# Patient Record
Sex: FEMALE | Race: WHITE | NOT HISPANIC OR LATINO | Employment: OTHER | URBAN - METROPOLITAN AREA
[De-identification: names, ages, dates, MRNs, and addresses within clinical notes are randomized per-mention and may not be internally consistent; named-entity substitution may affect disease eponyms.]

---

## 2017-02-01 ENCOUNTER — OFFICE VISIT (OUTPATIENT)
Dept: GASTROENTEROLOGY | Facility: CLINIC | Age: 62
End: 2017-02-01

## 2017-02-01 VITALS
DIASTOLIC BLOOD PRESSURE: 70 MMHG | BODY MASS INDEX: 43.97 KG/M2 | SYSTOLIC BLOOD PRESSURE: 132 MMHG | HEIGHT: 66 IN | WEIGHT: 273.6 LBS

## 2017-02-01 DIAGNOSIS — R11.0 NAUSEA: ICD-10-CM

## 2017-02-01 DIAGNOSIS — K63.5 COLON POLYPS: ICD-10-CM

## 2017-02-01 DIAGNOSIS — R19.7 DIARRHEA, UNSPECIFIED TYPE: ICD-10-CM

## 2017-02-01 DIAGNOSIS — R10.11 RIGHT UPPER QUADRANT ABDOMINAL PAIN: Primary | ICD-10-CM

## 2017-02-01 PROCEDURE — 99203 OFFICE O/P NEW LOW 30 MIN: CPT | Performed by: INTERNAL MEDICINE

## 2017-02-01 NOTE — PROGRESS NOTES
PATIENT INFORMATION  Diane Gerardo       - 1955    CHIEF COMPLAINT  Chief Complaint   Patient presents with   • Abdominal Pain   • Diarrhea   • Nausea       HISTORY OF PRESENT ILLNESS  Abdominal Pain   Associated symptoms include arthralgias, diarrhea, a fever and nausea.   Diarrhea    Associated symptoms include abdominal pain, arthralgias and a fever.   Nausea   Associated symptoms include abdominal pain, arthralgias, fatigue, a fever, joint swelling, nausea, numbness and weakness.       RUQ pain for the past 2 months. Pain is sharp, intermittent and at times radiates to the epigastric and back. There is some nausea but no vomiting. Eating does not affect this. No alleviating factors. Pain occurs daily.  CT was done at [facility] and US. CT without contrast was unremarkable. US showed hepatic steatosis.   BM will alternate from diarrhea to normal.  Labs with elevated alk. Phos of 123 and normal liver enzymes. No ETOH use. Hemoglobin was normal.  Last cls was over 8 years ago. One polyp was removed. Worse with walking or certain activity.  She has RA and is on Enbrel for this.  REVIEW OF SYSTEMS  Review of Systems   Constitutional: Positive for fatigue and fever.   HENT: Positive for ear pain, hearing loss and tinnitus.    Eyes: Positive for visual disturbance.   Respiratory: Positive for apnea.    Gastrointestinal: Positive for abdominal pain, diarrhea and nausea.   Musculoskeletal: Positive for arthralgias, back pain and joint swelling.   Neurological: Positive for dizziness, weakness and numbness.   Hematological: Bruises/bleeds easily.   All other systems reviewed and are negative.        ACTIVE PROBLEMS  Patient Active Problem List    Diagnosis   • Degeneration of intervertebral disc of lumbosacral region [M51.37]   • Morbid obesity [E66.01]   • Primary osteoarthritis of both knees [M17.0]     Overview Note:     Overview:   Ortho Dr Chase [place].     • Wears eyeglasses [Z97.3]      Overview Note:     Overview:   Eye care Dr [place]. [place].     • History of colonic polyps [Z86.010]   • Hypercholesterolemia [E78.00]   • Seasonal allergic rhinitis [J30.2]   • Osteoarthritis of both knees [M17.0]   • Hypothyroidism due to Hashimoto's thyroiditis [E03.8, E06.3]     Overview Note:     Overview:   Endo Dr Potter     • Anemia of chronic disorder [D63.8]   • Rheumatoid arthritis involving multiple joints [M06.9]   • Obstructive sleep apnea syndrome [G47.33]   • Type 2 diabetes mellitus [E11.9]     Overview Note:     Overview:   Endo Dr Potter.  [facility] in [place]. Last seen June 2016. No retinopathy reported.     • Arthralgia of multiple joints [M25.50]   • Chronic depression [F32.9]     Overview Note:     Overview:   Off/on. No meds 2016.     • Steatosis of liver [K76.0]     Overview Note:     Overview:   Noted on U/S 2012.     • History of biliary T-tube placement [Z98.890]   • Lactose intolerance [E73.9]   • Non-toxic multinodular goiter [E04.2]   • Hypothyroidism [E03.9]   • Snoring [R06.83]   • Vitamin D deficiency [E55.9]         PAST MEDICAL HISTORY  Past Medical History   Diagnosis Date   • Anemia    • Anxiety    • Depression    • Diabetes mellitus    • Disease of thyroid gland    • Hypertension    • Rheumatoid arthritis          SURGICAL HISTORY  Past Surgical History   Procedure Laterality Date   • Hysterectomy     • Cholecystectomy     • Neck surgery  05/05/2011         FAMILY HISTORY  Family History   Problem Relation Age of Onset   • Diabetes Mother    • Heart disease Father    • Cancer Father          SOCIAL HISTORY  Social History     Occupational History   • Not on file.     Social History Main Topics   • Smoking status: Former Smoker   • Smokeless tobacco: Never Used   • Alcohol use No   • Drug use: No   • Sexual activity: Defer         CURRENT MEDICATIONS    Current Outpatient Prescriptions:   •  atorvastatin (LIPITOR) 10 MG tablet, TAKE 1 TABLET EVERY  "DAY, Disp: , Rfl: 2  •  cholecalciferol (VITAMIN D3) 1000 UNITS tablet, Take 2,000 Units by mouth daily., Disp: , Rfl:   •  hydrochlorothiazide (HYDRODIURIL) 25 MG tablet, Take 25 mg by mouth daily., Disp: , Rfl:   •  HYDROcodone-acetaminophen (NORCO) 5-325 MG per tablet, Take 1 tablet by mouth every 6 (six) hours as needed for moderate pain (4-6) for up to 15 doses., Disp: 15 tablet, Rfl: 0  •  levothyroxine (SYNTHROID, LEVOTHROID) 150 MCG tablet, Take 150 mcg by mouth daily., Disp: , Rfl:   •  metFORMIN (GLUCOPHAGE) 1000 MG tablet, Take 1,000 mg by mouth daily with breakfast., Disp: , Rfl:   •  metFORMIN XR (GLUCOPHAGE-XR) 750 MG 24 hr tablet, TAKE 1 TABLET BY MOUTH DAILY WITH BREAKFAST, Disp: , Rfl: 2  •  methotrexate 2.5 MG tablet, Take 10 mg by mouth 3 (three) doses each week. Take doses 12 (twelve) hours apart from each other., Disp: , Rfl:     ALLERGIES  Nsaids; Penicillins; and Sulfa antibiotics    VITALS  Vitals:    02/01/17 1505   BP: 132/70   Weight: 273 lb 9.6 oz (124 kg)   Height: 66\" (167.6 cm)       LAST RESULTS   Admission on 10/14/2015, Discharged on 10/14/2015   Component Date Value Ref Range Status   • Color, UA 10/14/2015 Yellow  Yellow Final   • Appearance, UA 10/14/2015 Clear  Clear Final   • Glucose, UA 10/14/2015 Negative  Negative mg/dL Final   • Bilirubin, UA 10/14/2015 Negative  Negative Final   • Ketones, UA 10/14/2015 Negative  Negative mg/dL Final   • Specific Gravity, UA 10/14/2015 1.010  1.003 - 1.030 Final   • pH, UA 10/14/2015 5.5  4.5 - 8.0 Final   • Protein, UA 10/14/2015 Negative  Negative,Trace mg/dL Final   • Urobilinogen, UA 10/14/2015 0.2  0.2 - 1.0 E.U./dL Final   • Nitrite, UA 10/14/2015 Negative  Negative Final   • Blood, UA 10/14/2015 Negative  Negative Final   • Leukocytes, UA 10/14/2015 Negative  Negative Final   • WBC 10/14/2015 4.87  4.80 - 10.80 K/Cumm Final   • RBC 10/14/2015 4.09* 4.20 - 5.40 Million Final   • Hemoglobin 10/14/2015 11.4* 12.0 - 16.0 g/dL Final   • " Hematocrit 10/14/2015 35.1* 37.0 - 47.0 % Final   • MCV 10/14/2015 85.8  81.0 - 99.0 fL Final   • MCH 10/14/2015 27.9  27.0 - 31.0 pg Final   • MCHC 10/14/2015 32.5  31.0 - 37.0 g/dL Final   • RDW-CV 10/14/2015 14.6* 11.5 - 14.5 % Final   • Platelets 10/14/2015 213  140 - 500 K/Cumm Final   • MPV 10/14/2015 9.6  7.4 - 10.4 fL Final   • Neutrophil Rel % 10/14/2015 44* 45 - 70 % Final   • Lymphocyte Rel % 10/14/2015 35  20 - 45 % Final   • Monocyte Rel % 10/14/2015 9* 3 - 8 % Final   • Eosinophil Rel % 10/14/2015 3  0 - 4 % Final   • Basophil Rel % 10/14/2015 0  0 - 2 % Final   • Atypical Lymphocytes Rel % 10/14/2015 9  % Final   • Neutrophils Absolute 10/14/2015 2.09  1.50 - 8.30 K/Cumm Final   • Lymphocytes Absolute 10/14/2015 2.02  0.60 - 4.80 K/Cumm Final   • Monocytes Absolute 10/14/2015 0.55  0.00 - 1.00 K/Cumm Final   • Eosinophils Absolute 10/14/2015 0.14  0.10 - 0.30 K/Cumm Final   • Basophils Absolute 10/14/2015 0.04  0.00 - 0.20 K/Cumm Final   • Platelet Morphology 10/14/2015 Normal   Final   • RBC Morphology 10/14/2015 Normal   Final   • Immature Granulocyte Rel % 10/14/2015 0.0  0.0 - 0.6 % Final   • Troponin T 10/14/2015 <0.011  ng/mL Final    Comment:   Troponin T Reference Range:  0.000-0.030      Negative for AMI  0.031-0.100      Indeterminant for AMI       • Glucose 10/14/2015 92  65 - 99 mg/dL Final   • BUN 10/14/2015 15  6 - 20 mg/dL Final   • Creatinine 10/14/2015 0.89  0.57 - 1.00 mg/dL Final   • Sodium 10/14/2015 141  136 - 145 mmol/L Final   • Potassium 10/14/2015 4.5  3.5 - 5.2 mmol/L Final   • Chloride 10/14/2015 107  98 - 107 mmol/L Final   • CO2 10/14/2015 22  22 - 29 mmol/L Final   • Total Protein 10/14/2015 6.8  6.0 - 8.5 g/dL Final   • Albumin 10/14/2015 3.7  3.5 - 5.2 g/dL Final   • AST (SGOT) 10/14/2015 18  5 - 32 U/L Final   • Calcium 10/14/2015 8.8  8.6 - 10.5 mg/dL Final   • Total Bilirubin 10/14/2015 0.3  0.1 - 1.2 mg/dL Final   • Alkaline Phosphatase 10/14/2015 90  40 - 129 U/L  Final   • ALT (SGPT) 10/14/2015 20  5 - 33 U/L Final   • Est GFR by Clearance 10/14/2015 >60  ml/min/1.732 Final    Comment:   GFR Normal                            >60  Chronic Kidney Disease          <60  Kidney Failure                         <15         No results found.    PHYSICAL EXAM  Physical Exam   Constitutional: She is oriented to person, place, and time. She appears well-developed and well-nourished. No distress.   HENT:   Head: Normocephalic and atraumatic.   Mouth/Throat: Oropharynx is clear and moist.   Eyes: EOM are normal. Pupils are equal, round, and reactive to light.   Neck: Normal range of motion. No tracheal deviation present.   Cardiovascular: Normal rate, regular rhythm, normal heart sounds and intact distal pulses.  Exam reveals no gallop and no friction rub.    No murmur heard.  Pulmonary/Chest: Effort normal and breath sounds normal. No stridor. No respiratory distress. She has no wheezes. She has no rales. She exhibits no tenderness.   Abdominal: Soft. Bowel sounds are normal. She exhibits no distension. There is tenderness. There is no rebound and no guarding.   ruq pain but difficult to assess liver edge, some epigastric pain, no rebound or guarding.   Musculoskeletal: She exhibits no edema.   Lymphadenopathy:     She has no cervical adenopathy.   Neurological: She is alert and oriented to person, place, and time.   Skin: Skin is warm. She is not diaphoretic.   Psychiatric: She has a normal mood and affect. Her behavior is normal. Judgment and thought content normal.   Nursing note and vitals reviewed.      ASSESSMENT  Diagnoses and all orders for this visit:    Right upper quadrant abdominal pain  -     Case Request; Standing  -     Case Request    Nausea  -     Case Request; Standing  -     Case Request    Diarrhea, unspecified type  -     Case Request; Standing  -     Case Request    Colon polyps    Other orders  -     Implement Anesthesia orders day of procedure.; Standing  -      Obtain informed consent; Standing  -     Verify informed consent; Standing          PLAN  No Follow-up on file.    egd and cls and possibly repeat ct with contrast .        Risks, benefits and alternatives discussed including but not limited to the complications of bleeding, perforation and sedation related problems.

## 2017-02-07 ENCOUNTER — ANESTHESIA EVENT (OUTPATIENT)
Dept: PERIOP | Facility: HOSPITAL | Age: 62
End: 2017-02-07

## 2017-02-08 ENCOUNTER — HOSPITAL ENCOUNTER (OUTPATIENT)
Facility: HOSPITAL | Age: 62
Setting detail: HOSPITAL OUTPATIENT SURGERY
Discharge: HOME | End: 2017-02-08
Attending: INTERNAL MEDICINE | Admitting: INTERNAL MEDICINE

## 2017-02-08 ENCOUNTER — ANESTHESIA (OUTPATIENT)
Dept: PERIOP | Facility: HOSPITAL | Age: 62
End: 2017-02-08

## 2017-02-08 VITALS
OXYGEN SATURATION: 95 % | TEMPERATURE: 98 F | HEART RATE: 75 BPM | SYSTOLIC BLOOD PRESSURE: 124 MMHG | RESPIRATION RATE: 15 BRPM | DIASTOLIC BLOOD PRESSURE: 65 MMHG

## 2017-02-08 DIAGNOSIS — R10.11 RIGHT UPPER QUADRANT ABDOMINAL PAIN: ICD-10-CM

## 2017-02-08 DIAGNOSIS — R11.0 NAUSEA: ICD-10-CM

## 2017-02-08 DIAGNOSIS — R19.7 DIARRHEA, UNSPECIFIED TYPE: ICD-10-CM

## 2017-02-08 LAB
GLUCOSE BLDC GLUCOMTR-MCNC: 142 MG/DL (ref 70–130)
POTASSIUM BLD-SCNC: 3.7 MMOL/L (ref 3.5–5.2)

## 2017-02-08 PROCEDURE — 45380 COLONOSCOPY AND BIOPSY: CPT | Performed by: INTERNAL MEDICINE

## 2017-02-08 PROCEDURE — 84132 ASSAY OF SERUM POTASSIUM: CPT | Performed by: NURSE ANESTHETIST, CERTIFIED REGISTERED

## 2017-02-08 PROCEDURE — 25010000002 PROPOFOL 10 MG/ML EMULSION: Performed by: NURSE ANESTHETIST, CERTIFIED REGISTERED

## 2017-02-08 PROCEDURE — 43239 EGD BIOPSY SINGLE/MULTIPLE: CPT | Performed by: INTERNAL MEDICINE

## 2017-02-08 PROCEDURE — 82962 GLUCOSE BLOOD TEST: CPT

## 2017-02-08 RX ORDER — SODIUM CHLORIDE, SODIUM LACTATE, POTASSIUM CHLORIDE, CALCIUM CHLORIDE 600; 310; 30; 20 MG/100ML; MG/100ML; MG/100ML; MG/100ML
9 INJECTION, SOLUTION INTRAVENOUS CONTINUOUS
Status: DISCONTINUED | OUTPATIENT
Start: 2017-02-08 | End: 2017-02-08 | Stop reason: HOSPADM

## 2017-02-08 RX ORDER — SODIUM CHLORIDE 0.9 % (FLUSH) 0.9 %
1-10 SYRINGE (ML) INJECTION AS NEEDED
Status: DISCONTINUED | OUTPATIENT
Start: 2017-02-08 | End: 2017-02-08 | Stop reason: HOSPADM

## 2017-02-08 RX ORDER — LIDOCAINE HYDROCHLORIDE 10 MG/ML
0.3 INJECTION, SOLUTION EPIDURAL; INFILTRATION; INTRACAUDAL; PERINEURAL ONCE
Status: DISCONTINUED | OUTPATIENT
Start: 2017-02-08 | End: 2017-02-08 | Stop reason: HOSPADM

## 2017-02-08 RX ORDER — PROPOFOL 10 MG/ML
VIAL (ML) INTRAVENOUS AS NEEDED
Status: DISCONTINUED | OUTPATIENT
Start: 2017-02-08 | End: 2017-02-08 | Stop reason: SURG

## 2017-02-08 RX ORDER — LIDOCAINE HYDROCHLORIDE 20 MG/ML
INJECTION, SOLUTION INFILTRATION; PERINEURAL AS NEEDED
Status: DISCONTINUED | OUTPATIENT
Start: 2017-02-08 | End: 2017-02-08 | Stop reason: SURG

## 2017-02-08 RX ADMIN — PROPOFOL 50 MG: 10 INJECTION, EMULSION INTRAVENOUS at 11:19

## 2017-02-08 RX ADMIN — PROPOFOL 50 MG: 10 INJECTION, EMULSION INTRAVENOUS at 11:38

## 2017-02-08 RX ADMIN — PROPOFOL 50 MG: 10 INJECTION, EMULSION INTRAVENOUS at 11:24

## 2017-02-08 RX ADMIN — PROPOFOL 100 MG: 10 INJECTION, EMULSION INTRAVENOUS at 11:09

## 2017-02-08 RX ADMIN — PROPOFOL 50 MG: 10 INJECTION, EMULSION INTRAVENOUS at 11:27

## 2017-02-08 RX ADMIN — PROPOFOL 100 MG: 10 INJECTION, EMULSION INTRAVENOUS at 11:06

## 2017-02-08 RX ADMIN — PROPOFOL 50 MG: 10 INJECTION, EMULSION INTRAVENOUS at 11:16

## 2017-02-08 RX ADMIN — PROPOFOL 50 MG: 10 INJECTION, EMULSION INTRAVENOUS at 11:26

## 2017-02-08 RX ADMIN — PROPOFOL 50 MG: 10 INJECTION, EMULSION INTRAVENOUS at 11:33

## 2017-02-08 RX ADMIN — LIDOCAINE HYDROCHLORIDE 100 MG: 20 INJECTION, SOLUTION INFILTRATION; PERINEURAL at 11:05

## 2017-02-08 RX ADMIN — SODIUM CHLORIDE, SODIUM LACTATE, POTASSIUM CHLORIDE, AND CALCIUM CHLORIDE: .6; .31; .03; .02 INJECTION, SOLUTION INTRAVENOUS at 11:03

## 2017-02-08 RX ADMIN — PROPOFOL 50 MG: 10 INJECTION, EMULSION INTRAVENOUS at 11:12

## 2017-02-08 NOTE — PLAN OF CARE
Problem: Patient Care Overview (Adult)  Goal: Plan of Care Review  Outcome: Outcome(s) achieved Date Met:  02/08/17 02/08/17 1158   Coping/Psychosocial Response Interventions   Plan Of Care Reviewed With patient   Patient Care Overview   Progress improving   Outcome Evaluation   Outcome Summary/Follow up Plan vss, waiting to go home

## 2017-02-08 NOTE — H&P
HISTORY OF PRESENT ILLNESS  Abdominal Pain   Associated symptoms include arthralgias, diarrhea, a fever and nausea.   Diarrhea    Associated symptoms include abdominal pain, arthralgias and a fever.   Nausea   Associated symptoms include abdominal pain, arthralgias, fatigue, a fever, joint swelling, nausea, numbness and weakness.         RUQ pain for the past 2 months. Pain is sharp, intermittent and at times radiates to the epigastric and back. There is some nausea but no vomiting. Eating does not affect this. No alleviating factors. Pain occurs daily. CT was [facility] at [facility] and US. CT without contrast was unremarkable. US showed hepatic steatosis.   BM will alternate from diarrhea to normal.  Labs with elevated alk. Phos of 123 and normal liver enzymes. No ETOH use. Hemoglobin was normal. Last cls was over 8 years ago. One polyp was removed. Worse with walking or certain activity.  She has RA and is on Enbrel for this.  REVIEW OF SYSTEMS  Review of Systems   Constitutional: Positive for fatigue and fever.   HENT: Positive for ear pain, hearing loss and tinnitus.   Eyes: Positive for visual disturbance.   Respiratory: Positive for apnea.   Gastrointestinal: Positive for abdominal pain, diarrhea and nausea.   Musculoskeletal: Positive for arthralgias, back pain and joint swelling.   Neurological: Positive for dizziness, weakness and numbness.   Hematological: Bruises/bleeds easily.   All other systems reviewed and are negative.           ACTIVE PROBLEMS       Patient Active Problem List     Diagnosis   • Degeneration of intervertebral disc of lumbosacral region [M51.37]   • Morbid obesity [E66.01]   • Primary osteoarthritis of both knees [M17.0]       Overview Note:       Overview:   Ortho Dr Chase [place].   • Wears eyeglasses [Z97.3]       Overview Note:       Overview:   Eye care Dr [place]. [facility] [place].   • History of colonic polyps [Z86.010]   • Hypercholesterolemia [E78.00]   • Seasonal  allergic rhinitis [J30.2]   • Osteoarthritis of both knees [M17.0]   • Hypothyroidism due to Hashimoto's thyroiditis [E03.8, E06.3]       Overview Note:       Overview:   Endo Dr Potter   • Anemia of chronic disorder [D63.8]   • Rheumatoid arthritis involving multiple joints [M06.9]   • Obstructive sleep apnea syndrome [G47.33]   • Type 2 diabetes mellitus [E11.9]       Overview Note:       Overview:   Endo Dr Potter.  [facility] [place] in [place]. Last seen June 2016. No retinopathy reported.   • Arthralgia of multiple joints [M25.50]   • Chronic depression [F32.9]       Overview Note:       Overview:   Off/on. No meds 2016.   • Steatosis of liver [K76.0]       Overview Note:       Overview:   Noted on U/S 2012.   • History of biliary T-tube placement [Z98.890]   • Lactose intolerance [E73.9]   • Non-toxic multinodular goiter [E04.2]   • Hypothyroidism [E03.9]   • Snoring [R06.83]   • Vitamin D deficiency [E55.9]            PAST MEDICAL HISTORY   Medical History         Past Medical History   Diagnosis Date   • Anemia     • Anxiety     • Depression     • Diabetes mellitus     • Disease of thyroid gland     • Hypertension     • Rheumatoid arthritis                 SURGICAL HISTORY   Surgical History          Past Surgical History   Procedure Laterality Date   • Hysterectomy       • Cholecystectomy       • Neck surgery   05/05/2011               FAMILY HISTORY        Family History   Problem Relation Age of Onset   • Diabetes Mother     • Heart disease Father     • Cancer Father              SOCIAL HISTORY  Social History          Occupational History   • Not on file.           Social History Main Topics   • Smoking status: Former Smoker   • Smokeless tobacco: Never Used   • Alcohol use No   • Drug use: No   • Sexual activity: Defer            CURRENT MEDICATIONS     Current Outpatient Prescriptions:   • atorvastatin (LIPITOR) 10 MG tablet, TAKE 1 TABLET EVERY DAY, Disp: , Rfl: 2  • cholecalciferol  "(VITAMIN D3) 1000 UNITS tablet, Take 2,000 Units by mouth daily., Disp: , Rfl:   • hydrochlorothiazide (HYDRODIURIL) 25 MG tablet, Take 25 mg by mouth daily., Disp: , Rfl:   • HYDROcodone-acetaminophen (NORCO) 5-325 MG per tablet, Take 1 tablet by mouth every 6 (six) hours as needed for moderate pain (4-6) for up to 15 doses., Disp: 15 tablet, Rfl: 0  • levothyroxine (SYNTHROID, LEVOTHROID) 150 MCG tablet, Take 150 mcg by mouth daily., Disp: , Rfl:   • metFORMIN (GLUCOPHAGE) 1000 MG tablet, Take 1,000 mg by mouth daily with breakfast., Disp: , Rfl:   • metFORMIN XR (GLUCOPHAGE-XR) 750 MG 24 hr tablet, TAKE 1 TABLET BY MOUTH DAILY WITH BREAKFAST, Disp: , Rfl: 2  • methotrexate 2.5 MG tablet, Take 10 mg by mouth 3 (three) doses each week. Take doses 12 (twelve) hours apart from each other., Disp: , Rfl:      ALLERGIES  Nsaids; Penicillins; and Sulfa antibiotics     VITALS   Vitals    Vitals:     02/01/17 1505   BP: 132/70   Weight: 273 lb 9.6 oz (124 kg)   Height: 66\" (167.6 cm)            LAST RESULTS           Admission on 10/14/2015, Discharged on 10/14/2015   Component Date Value Ref Range Status   • Color, UA 10/14/2015 Yellow  Yellow Final   • Appearance, UA 10/14/2015 Clear  Clear Final   • Glucose, UA 10/14/2015 Negative  Negative mg/dL Final   • Bilirubin, UA 10/14/2015 Negative  Negative Final   • Ketones, UA 10/14/2015 Negative  Negative mg/dL Final   • Specific Gravity, UA 10/14/2015 1.010  1.003 - 1.030 Final   • pH, UA 10/14/2015 5.5  4.5 - 8.0 Final   • Protein, UA 10/14/2015 Negative  Negative,Trace mg/dL Final   • Urobilinogen, UA 10/14/2015 0.2  0.2 - 1.0 E.U./dL Final   • Nitrite, UA 10/14/2015 Negative  Negative Final   • Blood, UA 10/14/2015 Negative  Negative Final   • Leukocytes, UA 10/14/2015 Negative  Negative Final   • WBC 10/14/2015 4.87  4.80 - 10.80 K/Cumm Final   • RBC 10/14/2015 4.09* 4.20 - 5.40 Million Final   • Hemoglobin 10/14/2015 11.4* 12.0 - 16.0 g/dL Final   • Hematocrit " 10/14/2015 35.1* 37.0 - 47.0 % Final   • MCV 10/14/2015 85.8  81.0 - 99.0 fL Final   • MCH 10/14/2015 27.9  27.0 - 31.0 pg Final   • MCHC 10/14/2015 32.5  31.0 - 37.0 g/dL Final   • RDW-CV 10/14/2015 14.6* 11.5 - 14.5 % Final   • Platelets 10/14/2015 213  140 - 500 K/Cumm Final   • MPV 10/14/2015 9.6  7.4 - 10.4 fL Final   • Neutrophil Rel % 10/14/2015 44* 45 - 70 % Final   • Lymphocyte Rel % 10/14/2015 35  20 - 45 % Final   • Monocyte Rel % 10/14/2015 9* 3 - 8 % Final   • Eosinophil Rel % 10/14/2015 3  0 - 4 % Final   • Basophil Rel % 10/14/2015 0  0 - 2 % Final   • Atypical Lymphocytes Rel % 10/14/2015 9  % Final   • Neutrophils Absolute 10/14/2015 2.09  1.50 - 8.30 K/Cumm Final   • Lymphocytes Absolute 10/14/2015 2.02  0.60 - 4.80 K/Cumm Final   • Monocytes Absolute 10/14/2015 0.55  0.00 - 1.00 K/Cumm Final   • Eosinophils Absolute 10/14/2015 0.14  0.10 - 0.30 K/Cumm Final   • Basophils Absolute 10/14/2015 0.04  0.00 - 0.20 K/Cumm Final   • Platelet Morphology 10/14/2015 Normal    Final   • RBC Morphology 10/14/2015 Normal    Final   • Immature Granulocyte Rel % 10/14/2015 0.0  0.0 - 0.6 % Final   • Troponin T 10/14/2015 <0.011  ng/mL Final     Comment:   Troponin T Reference Range:  0.000-0.030 Negative for AMI  0.031-0.100 Indeterminant for AMI      • Glucose 10/14/2015 92  65 - 99 mg/dL Final   • BUN 10/14/2015 15  6 - 20 mg/dL Final   • Creatinine 10/14/2015 0.89  0.57 - 1.00 mg/dL Final   • Sodium 10/14/2015 141  136 - 145 mmol/L Final   • Potassium 10/14/2015 4.5  3.5 - 5.2 mmol/L Final   • Chloride 10/14/2015 107  98 - 107 mmol/L Final   • CO2 10/14/2015 22  22 - 29 mmol/L Final   • Total Protein 10/14/2015 6.8  6.0 - 8.5 g/dL Final   • Albumin 10/14/2015 3.7  3.5 - 5.2 g/dL Final   • AST (SGOT) 10/14/2015 18  5 - 32 U/L Final   • Calcium 10/14/2015 8.8  8.6 - 10.5 mg/dL Final   • Total Bilirubin 10/14/2015 0.3  0.1 - 1.2 mg/dL Final   • Alkaline Phosphatase 10/14/2015 90  40 - 129 U/L Final   • ALT (SGPT)  10/14/2015 20  5 - 33 U/L Final   • Est GFR by Clearance 10/14/2015 >60  ml/min/1.732 Final     Comment:   GFR Normal >60  Chronic Kidney Disease <60  Kidney Failure <15         No results found.     PHYSICAL EXAM  Physical Exam   Constitutional: She is oriented to person, place, and time. She appears well-developed and well-nourished. No distress.   HENT:   Head: Normocephalic and atraumatic.   Mouth/Throat: Oropharynx is clear and moist.   Eyes: EOM are normal. Pupils are equal, round, and reactive to light.   Neck: Normal range of motion. No tracheal deviation present.   Cardiovascular: Normal rate, regular rhythm, normal heart sounds and intact distal pulses. Exam reveals no gallop and no friction rub.   No murmur heard.  Pulmonary/Chest: Effort normal and breath sounds normal. No stridor. No respiratory distress. She has no wheezes. She has no rales. She exhibits no tenderness.   Abdominal: Soft. Bowel sounds are normal. She exhibits no distension. There is tenderness. There is no rebound and no guarding.   ruq pain but difficult to assess liver edge, some epigastric pain, no rebound or guarding.   Musculoskeletal: She exhibits no edema.   Lymphadenopathy:   She has no cervical adenopathy.   Neurological: She is alert and oriented to person, place, and time.   Skin: Skin is warm. She is not diaphoretic.   Psychiatric: She has a normal mood and affect. Her behavior is normal. Judgment and thought content normal.   Nursing note and vitals reviewed.        ASSESSMENT  Diagnoses and all orders for this visit:     Right upper quadrant abdominal pain  - Case Request; Standing  - Case Request     Nausea  - Case Request; Standing  - Case Request     Diarrhea, unspecified type  - Case Request; Standing  - Case Request     Colon polyps     Other orders  - Implement Anesthesia orders day of procedure.; Standing  - Obtain informed consent; Standing  - Verify informed consent; Standing              PLAN  No Follow-up on  file.     egd and cls and possibly repeat ct with contrast .           Risks, benefits and alternatives discussed including but not limited to the complications of bleeding, perforation and sedation related problems.

## 2017-02-08 NOTE — PLAN OF CARE
Problem: Patient Care Overview (Adult)  Goal: Adult Individualization and Mutuality  Outcome: Outcome(s) achieved Date Met:  02/08/17 02/08/17 1011   Individualization   Patient Specific Preferences none

## 2017-02-08 NOTE — OP NOTE
EGD and Colonoscopy Note:      Indication: Right upper quadrant abdominal pain, nausea, intermittent diarrhea, history of polyps.     Consent: Procedure of EGD and colonoscopy was explained to the patient and detail including but not limited to the complications of bleeding perforation and possible reactions to sedation.  The patient understood all this and was willing to proceed.    Sedation: Sedation was provided by anesthesia.    Procedure:  After excellent sedation a flexible endoscope was passed into the oropharynx into the distal esophagus.  The Z line was regular and the scope disease was traversed into the stomach although into the antrum.  There is gastric polyps extending from the fundus and body.  Gastritis was noted in the antrum mostly and biopsies were obtained.  No ulcers were noted here..  Scope was retroflexed here straightened and passed into the duodenal bulb all the way into the second portion with ease.  Small bowel biopsies were obtained.  The scope was withdrawn back into the stomach.  Antral biopsies were obtained.  The scope was withdrawn out of the patient with no immediate complications.  She was then turned around to the appropriate position and a digital rectal examination was performed and a flexible colonoscope was inserted into the rectum and passed to the cecum.  The cecum was identified by both the ileocecal valve and the appendiceal orifice.  The overall bowel preparation was fair to poor in the right colon especially the cecal bowl in the ascending colon.  She had some looping and redundancy in the colon such that external pressure and position changes were applied to successfully traversed the scope into the cecum.  Random biopsies were obtained.  No polyps are noted.  The entire examined colonic mucosa was free of any inflammatory changes.  The scope was slowly withdrawn to the rectum and retroflex were internal hemorrhoids are noted.  The scope was straightened and  withdrawn out of the patient with no immediate, patient's and she tolerated the procedure well.      Impression/Plan:  Gastritis  Gastric polyps  Internal hemorrhoids  We'll await biopsy results and a letter be sent to her in regards to this.  She'll see me back in the office in about 2-3 weeks.

## 2017-02-08 NOTE — PLAN OF CARE
Problem: GI Endoscopy (Adult)  Goal: Signs and Symptoms of Listed Potential Problems Will be Absent or Manageable (GI Endoscopy)  Outcome: Ongoing (interventions implemented as appropriate)    02/08/17 1108   GI Endoscopy   Problems Assessed (GI Endoscopy) all   Problems Present (GI Endoscopy) none

## 2017-02-08 NOTE — ANESTHESIA PREPROCEDURE EVALUATION
Anesthesia Evaluation     Patient summary reviewed and Nursing notes reviewed   no history of anesthetic complications:  NPO Status: > 8 hours   Airway   Mallampati: I  TM distance: >3 FB  Neck ROM: full  no difficulty expected  Dental      Comment: Upper permanent bridge, front    Pulmonary - normal exam    breath sounds clear to auscultation  (+) a smoker (quit 4/1/2008) Former, sleep apnea on CPAP,   Cardiovascular - normal exam  Exercise tolerance: poor (<4 METS) (Limited due to hip and knee pain)    Rhythm: regular  Rate: normal    (+) hypertension well controlled, valvular problems/murmurs (asymptomatic) MVP,       Neuro/Psych  (+) psychiatric history Anxiety and Depression,    Numbness: hands on occ, mubness left hip to kneecap.  GI/Hepatic/Renal/Endo    (+) morbid obesity, liver disease (fatty liver), diabetes mellitus type 2 well controlled, hypothyroidism (Hashimoto's thyroiditis),     Musculoskeletal     (+) back pain (DDD L-S spine), neck pain (C4-C5 ACDF with hardware, pain with turning head to left),   Abdominal   (+) obese,    Substance History   Alcohol use: occasional, socially.     OB/GYN negative ob/gyn ROS         Other   (+) blood dyscrasia (anemia of chronic disease), arthritis (rheumatoid arthritis--knees and hips)                               Anesthesia Plan    ASA 3     MAC     intravenous induction   Anesthetic plan and risks discussed with patient.

## 2017-02-08 NOTE — PLAN OF CARE
Problem: Patient Care Overview (Adult)  Goal: Plan of Care Review  Outcome: Ongoing (interventions implemented as appropriate)    02/08/17 1011   Coping/Psychosocial Response Interventions   Plan Of Care Reviewed With patient   Patient Care Overview   Progress no change   Outcome Evaluation   Outcome Summary/Follow up Plan vss, waiting for procedure

## 2017-02-08 NOTE — PLAN OF CARE
Problem: Patient Care Overview (Adult)  Goal: Adult Individualization and Mutuality  Outcome: Ongoing (interventions implemented as appropriate)    02/08/17 1011   Individualization   Patient Specific Preferences none

## 2017-02-08 NOTE — ANESTHESIA POSTPROCEDURE EVALUATION
Patient: Diane Gerardo    Procedure Summary     Date Anesthesia Start Anesthesia Stop Room / Location    02/08/17 1103 1143 [facility] / [facility]       Procedure Diagnosis Surgeon Provider    ESOPHAGOGASTRODUODENOSCOPY (N/A Esophagus); COLONOSCOPY (N/A ) Nausea; Right upper quadrant abdominal pain; Diarrhea, unspecified type  (Nausea [R11.0]; Right upper quadrant abdominal pain [R10.11]; Diarrhea, unspecified type [R19.7]) MD Feli Dye CRNA          Anesthesia Type: MAC  Last vitals  BP      Temp      Pulse     Resp      SpO2        Post Anesthesia Care and Evaluation    Patient location during evaluation: PHASE II  Patient participation: complete - patient participated  Level of consciousness: awake and alert  Pain score: 0  Pain management: adequate  Airway patency: patent  Anesthetic complications: No anesthetic complications  PONV Status: none  Cardiovascular status: acceptable  Respiratory status: acceptable  Hydration status: acceptable

## 2017-02-08 NOTE — PLAN OF CARE
Problem: GI Endoscopy (Adult)  Goal: Signs and Symptoms of Listed Potential Problems Will be Absent or Manageable (GI Endoscopy)  Outcome: Ongoing (interventions implemented as appropriate)    02/08/17 1011   GI Endoscopy   Problems Assessed (GI Endoscopy) all   Problems Present (GI Endoscopy) none

## 2017-02-08 NOTE — PLAN OF CARE
Problem: GI Endoscopy (Adult)  Goal: Signs and Symptoms of Listed Potential Problems Will be Absent or Manageable (GI Endoscopy)  Outcome: Outcome(s) achieved Date Met:  02/08/17 02/08/17 1158   GI Endoscopy   Problems Assessed (GI Endoscopy) all   Problems Present (GI Endoscopy) none

## 2017-02-10 LAB
LAB AP CASE REPORT: NORMAL
Lab: NORMAL
PATH REPORT.FINAL DX SPEC: NORMAL

## 2017-02-17 DIAGNOSIS — R10.11 RUQ ABDOMINAL PAIN: Primary | ICD-10-CM

## 2017-02-17 PROBLEM — K29.70 GASTRITIS: Status: ACTIVE | Noted: 2017-02-17

## 2017-02-17 PROBLEM — K31.7 GASTRIC POLYPS: Status: ACTIVE | Noted: 2017-02-17

## 2017-02-17 PROBLEM — K64.8 INTERNAL HEMORRHOIDS: Status: ACTIVE | Noted: 2017-02-17

## 2017-02-28 ENCOUNTER — OFFICE VISIT (OUTPATIENT)
Dept: GASTROENTEROLOGY | Facility: CLINIC | Age: 62
End: 2017-02-28

## 2017-02-28 ENCOUNTER — APPOINTMENT (OUTPATIENT)
Dept: LAB | Facility: HOSPITAL | Age: 62
End: 2017-02-28

## 2017-02-28 VITALS
DIASTOLIC BLOOD PRESSURE: 66 MMHG | BODY MASS INDEX: 44.29 KG/M2 | SYSTOLIC BLOOD PRESSURE: 124 MMHG | HEIGHT: 66 IN | WEIGHT: 275.6 LBS

## 2017-02-28 DIAGNOSIS — R10.11 RIGHT UPPER QUADRANT ABDOMINAL PAIN: Primary | ICD-10-CM

## 2017-02-28 DIAGNOSIS — R74.8 ELEVATED ALKALINE PHOSPHATASE LEVEL: ICD-10-CM

## 2017-02-28 LAB
ALBUMIN SERPL-MCNC: 3.5 G/DL (ref 3.5–5.2)
ALP SERPL-CCNC: 120 U/L (ref 40–129)
ALT SERPL W P-5'-P-CCNC: 21 U/L (ref 5–33)
AST SERPL-CCNC: 15 U/L (ref 5–32)
BILIRUB CONJ SERPL-MCNC: <0.2 MG/DL (ref 0.2–0.3)
BILIRUB INDIRECT SERPL-MCNC: ABNORMAL MG/DL
BILIRUB SERPL-MCNC: 0.3 MG/DL (ref 0.2–1.2)
PROT SERPL-MCNC: 7.6 G/DL (ref 6–8.5)

## 2017-02-28 PROCEDURE — 36415 COLL VENOUS BLD VENIPUNCTURE: CPT | Performed by: INTERNAL MEDICINE

## 2017-02-28 PROCEDURE — 83516 IMMUNOASSAY NONANTIBODY: CPT | Performed by: INTERNAL MEDICINE

## 2017-02-28 PROCEDURE — 99214 OFFICE O/P EST MOD 30 MIN: CPT | Performed by: INTERNAL MEDICINE

## 2017-02-28 PROCEDURE — 80076 HEPATIC FUNCTION PANEL: CPT | Performed by: INTERNAL MEDICINE

## 2017-02-28 RX ORDER — METHYLPREDNISOLONE 4 MG/1
TABLET ORAL
Refills: 0 | COMMUNITY
Start: 2017-02-16 | End: 2018-07-20

## 2017-02-28 RX ORDER — CIPROFLOXACIN 500 MG/1
TABLET, FILM COATED ORAL
Refills: 0 | COMMUNITY
Start: 2017-02-16 | End: 2018-07-20

## 2017-02-28 NOTE — PROGRESS NOTES
PATIENT INFORMATION  Diane Gerardo       - 1955    CHIEF COMPLAINT  Chief Complaint   Patient presents with   • Abdominal Pain     FOLLOW UP ON EGD AND C/S       HISTORY OF PRESENT ILLNESS  Abdominal Pain   Associated symptoms include arthralgias and myalgias.     She is here today in follow up from her egd and cls. Pathology are all reviewed with her today. Mild duodenitis. She still has right flank pain which radiates to the front. Not affected by eating.  This is exacerbated by some movements. She does have degenerative disc disease. Weight has been stable. She is on Enbrel for RA.  US with moderated steatosis.  REVIEW OF SYSTEMS  Review of Systems   HENT: Positive for tinnitus.    Gastrointestinal: Positive for abdominal pain.   Musculoskeletal: Positive for arthralgias, back pain, joint swelling and myalgias.   Neurological: Positive for numbness.   Hematological: Bruises/bleeds easily.   All other systems reviewed and are negative.        ACTIVE PROBLEMS  Patient Active Problem List    Diagnosis   • Gastric polyps [K31.7]   • Internal hemorrhoids [K64.8]   • Gastritis [K29.70]   • Degeneration of intervertebral disc of lumbosacral region [M51.37]   • Morbid obesity [E66.01]   • Primary osteoarthritis of both knees [M17.0]     Overview Note:     Overview:   Ortho Dr Chase [place].     • Wears eyeglasses [Z97.3]     Overview Note:     Overview:   Eye care Dr Fall[facility]. [facility] [place].     • History of colonic polyps [Z86.010]   • Hypercholesterolemia [E78.00]   • Seasonal allergic rhinitis [J30.2]   • Osteoarthritis of both knees [M17.0]   • Hypothyroidism due to Hashimoto's thyroiditis [E03.8, E06.3]     Overview Note:     Overview:   [facility]     • Anemia of chronic disorder [D63.8]   • Rheumatoid arthritis involving multiple joints [M06.9]   • Obstructive sleep apnea syndrome [G47.33]   • Type 2 diabetes mellitus [E11.9]     Overview Note:     Overview:   [facility].  [facility]  [facility] in [place]. Last seen June 2016. No retinopathy reported.     • Arthralgia of multiple joints [M25.50]   • Chronic depression [F32.9]     Overview Note:     Overview:   Off/on. No meds 2016.     • Steatosis of liver [K76.0]     Overview Note:     Overview:   Noted on [place] 2012.     • History of biliary T-tube placement [Z98.890]   • Lactose intolerance [E73.9]   • Non-toxic multinodular goiter [E04.2]   • Hypothyroidism [E03.9]   • Snoring [R06.83]   • Vitamin D deficiency [E55.9]         PAST MEDICAL HISTORY  Past Medical History   Diagnosis Date   • Anemia    • Anxiety    • Colon polyp    • Depression    • Diabetes mellitus    • Disease of thyroid gland    • Hypertension    • Rheumatoid arthritis          SURGICAL HISTORY  Past Surgical History   Procedure Laterality Date   • Hysterectomy     • Cholecystectomy     • Neck surgery  05/05/2011   • Endoscopy N/A 2/8/2017     Procedure: ESOPHAGOGASTRODUODENOSCOPY;  Surgeon: Domi Juarez MD;  Location: [facility];  Service:    • Colonoscopy N/A 2/8/2017     Procedure: COLONOSCOPY;  Surgeon: Domi Juarez MD;  Location: [facility] OR;  Service:          FAMILY HISTORY  Family History   Problem Relation Age of Onset   • Diabetes Mother    • Heart disease Father    • Cancer Father          SOCIAL HISTORY  Social History     Occupational History   • Not on file.     Social History Main Topics   • Smoking status: Former Smoker   • Smokeless tobacco: Never Used   • Alcohol use Yes      Comment: occasional   • Drug use: No   • Sexual activity: Defer         CURRENT MEDICATIONS    Current Outpatient Prescriptions:   •  cholecalciferol (VITAMIN D3) 1000 UNITS tablet, Take 2,000 Units by mouth daily., Disp: , Rfl:   •  ciprofloxacin (CIPRO) 500 MG tablet, TAKE 1 TABLET BY MOUTH TWICE A DAY FOR 10 DAYS, Disp: , Rfl: 0  •  Etanercept 50 MG/ML solution auto-injector, Inject 50 mg under the skin Every 14 (Fourteen) Days., Disp: , Rfl:   •  hydrochlorothiazide (HYDRODIURIL) 25  "MG tablet, Take 25 mg by mouth daily., Disp: , Rfl:   •  levothyroxine (SYNTHROID, LEVOTHROID) 150 MCG tablet, Take 150 mcg by mouth daily., Disp: , Rfl:   •  metFORMIN XR (GLUCOPHAGE-XR) 750 MG 24 hr tablet, TAKE 1 TABLET BY MOUTH DAILY WITH BREAKFAST, Disp: , Rfl: 2  •  MethylPREDNISolone (MEDROL, CAREY,) 4 MG tablet, TAKE 6 TABLETS ON DAY 1 AS DIRECTED ON PACKAGE AND DECREASE BY 1 TAB EACH DAY FOR A TOTAL OF 6 DAYS, Disp: , Rfl: 0  •  polyethylene glycol (GoLYTELY) 236 G solution, Drink 1/2 starting at 2:00pm the day prior. Drink remaining 1/2 at 10:00 pm. May add flavor packet., Disp: 4000 mL, Rfl: 0    ALLERGIES  Nsaids; Penicillins; and Sulfa antibiotics    VITALS  Vitals:    02/28/17 1505   BP: 124/66   Weight: 275 lb 9.6 oz (125 kg)   Height: 66\" (167.6 cm)       LAST RESULTS   Admission on 02/08/2017, Discharged on 02/08/2017   Component Date Value Ref Range Status   • Potassium 02/08/2017 3.7  3.5 - 5.2 mmol/L Final   • Glucose 02/08/2017 142* 70 - 130 mg/dL Final   • Case Report 02/08/2017    Final                    Value:Surgical Pathology Report                         Case: [id number]                                  Authorizing Provider:  Domi Juarez MD          Collected:           02/08/2017 11:13 AM          Ordering Location:     [facility]   Received:            02/08/2017 05:25 PM                                 OR                                                                           Pathologist:           Brian Clark MD                                                          Specimens:   1) - Small Intestine, Duodenum                                                                      2) - Stomach, biopsy                                                                                3) - Stomach, polyp                                                                                 4) - Large Intestine, random biopsies                                                   "   • Final Diagnosis 02/08/2017    Final                    Value:This result contains rich text formatting which cannot be displayed here.     No results found.    PHYSICAL EXAM  Physical Exam   Constitutional: She is oriented to person, place, and time. She appears well-developed and well-nourished. No distress.   HENT:   Head: Normocephalic and atraumatic.   Mouth/Throat: Oropharynx is clear and moist.   Eyes: EOM are normal. Pupils are equal, round, and reactive to light.   Neck: Normal range of motion. No tracheal deviation present.   Cardiovascular: Normal rate, regular rhythm, normal heart sounds and intact distal pulses.  Exam reveals no gallop and no friction rub.    No murmur heard.  Pulmonary/Chest: Effort normal and breath sounds normal. No stridor. No respiratory distress. She has no wheezes. She has no rales. She exhibits no tenderness.   Abdominal: Soft. Bowel sounds are normal. She exhibits no distension. There is no tenderness. There is no rebound and no guarding.   Musculoskeletal: She exhibits no edema.   Lymphadenopathy:     She has no cervical adenopathy.   Neurological: She is alert and oriented to person, place, and time.   Skin: Skin is warm. She is not diaphoretic.   Psychiatric: She has a normal mood and affect. Her behavior is normal. Judgment and thought content normal.   Nursing note and vitals reviewed.      ASSESSMENT  Diagnoses and all orders for this visit:    Right upper quadrant abdominal pain  -     Hepatic Function Panel  -     Anti-smooth muscle antibody titer  -     Mitochondrial antibodies, M2    Elevated alkaline phosphatase level  -     Hepatic Function Panel  -     Anti-smooth muscle antibody titer  -     Mitochondrial antibodies, M2    Other orders  -     ciprofloxacin (CIPRO) 500 MG tablet; TAKE 1 TABLET BY MOUTH TWICE A DAY FOR 10 DAYS  -     MethylPREDNISolone (MEDROL, CAREY,) 4 MG tablet; TAKE 6 TABLETS ON DAY 1 AS DIRECTED ON PACKAGE AND DECREASE BY 1 TAB EACH DAY FOR A  TOTAL OF 6 DAYS          PLAN  No Follow-up on file.      Will do above workup. May need ct abdomen if others are normal.

## 2017-03-01 LAB
ACTIN IGG SERPL-ACNC: 8 UNITS (ref 0–19)
DEPRECATED MITOCHONDRIA M2 IGG SER-ACNC: <20 UNITS (ref 0–20)
ENDOMYSIUM IGA SER QL: NEGATIVE
GLIADIN PEPTIDE IGA SER-ACNC: 6 UNITS (ref 0–19)
GLIADIN PEPTIDE IGG SER-ACNC: 23 UNITS (ref 0–19)
IGA SERPL-MCNC: 382 MG/DL (ref 87–352)
TTG IGA SER-ACNC: <2 U/ML (ref 0–3)
TTG IGG SER-ACNC: 2 U/ML (ref 0–5)

## 2018-05-14 ENCOUNTER — TRANSCRIBE ORDERS (OUTPATIENT)
Dept: ADMINISTRATIVE | Facility: HOSPITAL | Age: 63
End: 2018-05-14

## 2018-05-14 DIAGNOSIS — E03.9 HYPOTHYROIDISM, UNSPECIFIED TYPE: Primary | ICD-10-CM

## 2018-05-22 ENCOUNTER — HOSPITAL ENCOUNTER (OUTPATIENT)
Dept: ULTRASOUND IMAGING | Facility: HOSPITAL | Age: 63
Discharge: HOME | End: 2018-05-22
Admitting: NURSE PRACTITIONER

## 2018-05-22 DIAGNOSIS — E03.9 HYPOTHYROIDISM, UNSPECIFIED TYPE: ICD-10-CM

## 2018-05-22 PROCEDURE — 76942 ECHO GUIDE FOR BIOPSY: CPT

## 2018-05-22 RX ORDER — LIDOCAINE HYDROCHLORIDE 10 MG/ML
5 INJECTION, SOLUTION INFILTRATION; PERINEURAL ONCE
Status: COMPLETED | OUTPATIENT
Start: 2018-05-22 | End: 2018-05-22

## 2018-05-22 RX ADMIN — LIDOCAINE HYDROCHLORIDE 5 ML: 10 INJECTION, SOLUTION INFILTRATION; PERINEURAL at 09:21

## 2018-05-30 LAB
LAB AP CASE REPORT: NORMAL
Lab: NORMAL
PATH REPORT.FINAL DX SPEC: NORMAL

## 2018-07-14 ENCOUNTER — HOSPITAL ENCOUNTER (EMERGENCY)
Facility: HOSPITAL | Age: 63
Discharge: HOME | End: 2018-07-15
Attending: EMERGENCY MEDICINE | Admitting: EMERGENCY MEDICINE

## 2018-07-14 DIAGNOSIS — I10 ESSENTIAL HYPERTENSION: ICD-10-CM

## 2018-07-14 DIAGNOSIS — K43.9 SPIGELIAN HERNIA: Primary | ICD-10-CM

## 2018-07-14 PROCEDURE — 93005 ELECTROCARDIOGRAM TRACING: CPT | Performed by: EMERGENCY MEDICINE

## 2018-07-14 PROCEDURE — 99284 EMERGENCY DEPT VISIT MOD MDM: CPT | Performed by: EMERGENCY MEDICINE

## 2018-07-14 PROCEDURE — 99284 EMERGENCY DEPT VISIT MOD MDM: CPT

## 2018-07-15 ENCOUNTER — APPOINTMENT (OUTPATIENT)
Dept: CT IMAGING | Facility: HOSPITAL | Age: 63
End: 2018-07-15

## 2018-07-15 ENCOUNTER — APPOINTMENT (OUTPATIENT)
Dept: GENERAL RADIOLOGY | Facility: HOSPITAL | Age: 63
End: 2018-07-15

## 2018-07-15 VITALS
SYSTOLIC BLOOD PRESSURE: 142 MMHG | WEIGHT: 254 LBS | BODY MASS INDEX: 43.36 KG/M2 | OXYGEN SATURATION: 98 % | TEMPERATURE: 97.7 F | RESPIRATION RATE: 16 BRPM | HEIGHT: 64 IN | DIASTOLIC BLOOD PRESSURE: 68 MMHG | HEART RATE: 82 BPM

## 2018-07-15 LAB
ALBUMIN SERPL-MCNC: 4 G/DL (ref 3.5–5.2)
ALBUMIN/GLOB SERPL: 1.2 G/DL
ALP SERPL-CCNC: 97 U/L (ref 40–129)
ALT SERPL W P-5'-P-CCNC: 32 U/L (ref 5–33)
AMPHET+METHAMPHET UR QL: NEGATIVE
AMPHETAMINES UR QL: NEGATIVE
ANION GAP SERPL CALCULATED.3IONS-SCNC: 9.7 MMOL/L
AST SERPL-CCNC: 26 U/L (ref 5–32)
BARBITURATES UR QL SCN: NEGATIVE
BASOPHILS # BLD AUTO: 0.03 10*3/MM3 (ref 0–0.2)
BASOPHILS NFR BLD AUTO: 0.4 % (ref 0–2)
BENZODIAZ UR QL SCN: NEGATIVE
BILIRUB SERPL-MCNC: 0.3 MG/DL (ref 0.2–1.2)
BILIRUB UR QL STRIP: NEGATIVE
BUN BLD-MCNC: 24 MG/DL (ref 8–23)
BUN/CREAT SERPL: 20.7 (ref 7–25)
BUPRENORPHINE SERPL-MCNC: NEGATIVE NG/ML
CALCIUM SPEC-SCNC: 9 MG/DL (ref 8.8–10.5)
CANNABINOIDS SERPL QL: NEGATIVE
CHLORIDE SERPL-SCNC: 100 MMOL/L (ref 98–107)
CLARITY UR: CLEAR
CO2 SERPL-SCNC: 27.3 MMOL/L (ref 22–29)
COCAINE UR QL: NEGATIVE
COLOR UR: YELLOW
CREAT BLD-MCNC: 1.16 MG/DL (ref 0.57–1)
DEPRECATED RDW RBC AUTO: 41.8 FL (ref 37–54)
EOSINOPHIL # BLD AUTO: 0.09 10*3/MM3 (ref 0.1–0.3)
EOSINOPHIL NFR BLD AUTO: 1.2 % (ref 0–4)
ERYTHROCYTE [DISTWIDTH] IN BLOOD BY AUTOMATED COUNT: 13.7 % (ref 11.5–14.5)
GFR SERPL CREATININE-BSD FRML MDRD: 47 ML/MIN/1.73
GLOBULIN UR ELPH-MCNC: 3.3 GM/DL
GLUCOSE BLD-MCNC: 123 MG/DL (ref 65–99)
GLUCOSE UR STRIP-MCNC: NEGATIVE MG/DL
HCT VFR BLD AUTO: 35.8 % (ref 37–47)
HGB BLD-MCNC: 11.8 G/DL (ref 12–16)
HGB UR QL STRIP.AUTO: NEGATIVE
IMM GRANULOCYTES # BLD: 0.03 10*3/MM3 (ref 0–0.03)
IMM GRANULOCYTES NFR BLD: 0.4 % (ref 0–0.5)
KETONES UR QL STRIP: ABNORMAL
LEUKOCYTE ESTERASE UR QL STRIP.AUTO: NEGATIVE
LIPASE SERPL-CCNC: 41 U/L (ref 13–60)
LYMPHOCYTES # BLD AUTO: 2.37 10*3/MM3 (ref 0.6–4.8)
LYMPHOCYTES NFR BLD AUTO: 30.8 % (ref 20–45)
MCH RBC QN AUTO: 27.5 PG (ref 27–31)
MCHC RBC AUTO-ENTMCNC: 33 G/DL (ref 31–37)
MCV RBC AUTO: 83.4 FL (ref 81–99)
METHADONE UR QL SCN: NEGATIVE
MONOCYTES # BLD AUTO: 0.68 10*3/MM3 (ref 0–1)
MONOCYTES NFR BLD AUTO: 8.8 % (ref 3–8)
NEUTROPHILS # BLD AUTO: 4.49 10*3/MM3 (ref 1.5–8.3)
NEUTROPHILS NFR BLD AUTO: 58.4 % (ref 45–70)
NITRITE UR QL STRIP: NEGATIVE
NRBC BLD MANUAL-RTO: 0 /100 WBC (ref 0–0)
OPIATES UR QL: NEGATIVE
OXYCODONE UR QL SCN: NEGATIVE
PCP UR QL SCN: NEGATIVE
PH UR STRIP.AUTO: 6.5 [PH] (ref 4.5–8)
PLATELET # BLD AUTO: 275 10*3/MM3 (ref 140–500)
PMV BLD AUTO: 9.8 FL (ref 7.4–10.4)
POTASSIUM BLD-SCNC: 3.3 MMOL/L (ref 3.5–5.2)
PROPOXYPH UR QL: NEGATIVE
PROT SERPL-MCNC: 7.3 G/DL (ref 6–8.5)
PROT UR QL STRIP: NEGATIVE
RBC # BLD AUTO: 4.29 10*6/MM3 (ref 4.2–5.4)
SODIUM BLD-SCNC: 137 MMOL/L (ref 136–145)
SP GR UR STRIP: 1.02 (ref 1–1.03)
TRICYCLICS UR QL SCN: NEGATIVE
TROPONIN T SERPL-MCNC: <0.01 NG/ML (ref 0–0.03)
TROPONIN T SERPL-MCNC: <0.01 NG/ML (ref 0–0.03)
UROBILINOGEN UR QL STRIP: ABNORMAL
WBC NRBC COR # BLD: 7.69 10*3/MM3 (ref 4.8–10.8)

## 2018-07-15 PROCEDURE — 71275 CT ANGIOGRAPHY CHEST: CPT

## 2018-07-15 PROCEDURE — 84484 ASSAY OF TROPONIN QUANT: CPT | Performed by: EMERGENCY MEDICINE

## 2018-07-15 PROCEDURE — 74177 CT ABD & PELVIS W/CONTRAST: CPT

## 2018-07-15 PROCEDURE — 81003 URINALYSIS AUTO W/O SCOPE: CPT | Performed by: EMERGENCY MEDICINE

## 2018-07-15 PROCEDURE — 93010 ELECTROCARDIOGRAM REPORT: CPT | Performed by: INTERNAL MEDICINE

## 2018-07-15 PROCEDURE — 96374 THER/PROPH/DIAG INJ IV PUSH: CPT

## 2018-07-15 PROCEDURE — 85025 COMPLETE CBC W/AUTO DIFF WBC: CPT | Performed by: EMERGENCY MEDICINE

## 2018-07-15 PROCEDURE — 80306 DRUG TEST PRSMV INSTRMNT: CPT | Performed by: EMERGENCY MEDICINE

## 2018-07-15 PROCEDURE — 83690 ASSAY OF LIPASE: CPT | Performed by: EMERGENCY MEDICINE

## 2018-07-15 PROCEDURE — 71045 X-RAY EXAM CHEST 1 VIEW: CPT

## 2018-07-15 PROCEDURE — 70450 CT HEAD/BRAIN W/O DYE: CPT

## 2018-07-15 PROCEDURE — 80053 COMPREHEN METABOLIC PANEL: CPT | Performed by: EMERGENCY MEDICINE

## 2018-07-15 PROCEDURE — 96376 TX/PRO/DX INJ SAME DRUG ADON: CPT

## 2018-07-15 PROCEDURE — 0 IOPAMIDOL PER 1 ML: Performed by: EMERGENCY MEDICINE

## 2018-07-15 RX ORDER — LISINOPRIL 10 MG/1
10 TABLET ORAL DAILY
Qty: 30 TABLET | Refills: 0 | Status: SHIPPED | OUTPATIENT
Start: 2018-07-15 | End: 2018-08-05

## 2018-07-15 RX ORDER — CYCLOBENZAPRINE HCL 10 MG
10 TABLET ORAL ONCE
Status: COMPLETED | OUTPATIENT
Start: 2018-07-15 | End: 2018-07-15

## 2018-07-15 RX ORDER — CYCLOBENZAPRINE HCL 10 MG
10 TABLET ORAL 3 TIMES DAILY PRN
Qty: 30 TABLET | Refills: 0 | Status: SHIPPED | OUTPATIENT
Start: 2018-07-15 | End: 2018-08-05

## 2018-07-15 RX ORDER — LABETALOL HYDROCHLORIDE 5 MG/ML
10 INJECTION, SOLUTION INTRAVENOUS ONCE
Status: COMPLETED | OUTPATIENT
Start: 2018-07-15 | End: 2018-07-15

## 2018-07-15 RX ORDER — LABETALOL HYDROCHLORIDE 5 MG/ML
20 INJECTION, SOLUTION INTRAVENOUS ONCE
Status: DISCONTINUED | OUTPATIENT
Start: 2018-07-15 | End: 2018-07-15

## 2018-07-15 RX ADMIN — CYCLOBENZAPRINE HYDROCHLORIDE 10 MG: 10 TABLET, FILM COATED ORAL at 02:48

## 2018-07-15 RX ADMIN — LABETALOL HYDROCHLORIDE 10 MG: 5 INJECTION, SOLUTION INTRAVENOUS at 01:18

## 2018-07-15 RX ADMIN — IOPAMIDOL 50 ML: 755 INJECTION, SOLUTION INTRAVENOUS at 01:25

## 2018-07-15 RX ADMIN — LABETALOL HYDROCHLORIDE 10 MG: 5 INJECTION, SOLUTION INTRAVENOUS at 02:49

## 2018-07-15 RX ADMIN — IOPAMIDOL 100 ML: 755 INJECTION, SOLUTION INTRAVENOUS at 01:22

## 2018-07-15 NOTE — DISCHARGE INSTRUCTIONS
Follow-up with Perenoud week for your hernia.  Follow-up with Abigail Yun this week for your hypertension.  Return to emergency department if increasing pain, worse in any way at all.

## 2018-07-15 NOTE — ED NOTES
Pt returned from radiology. In radiology pt was able to sit up turn around on table without any difficulty or issues. Pt cont. To c/o ringing in ears.      Wendie Moeller RN  07/15/18 0122

## 2018-07-15 NOTE — ED NOTES
Daughter stated pt feels like she's going to pass out. Pt was sitting up in bed appeared to pass out. Pt was layed flat. Dr. Crafton called to pt bedside. Pt awaked with loud stimulation. Pt opened eyes. Pt to radiology dept. With Tan Moeller RN  07/15/18 0121

## 2018-07-15 NOTE — ED NOTES
Pt resting quietly states doing a little better. Still having ringing in her ears and her bilateral side spasms are a little less frequent     Wendie Moeller RN  07/15/18 6861

## 2018-07-15 NOTE — ED PROVIDER NOTES
EMERGENCY DEPARTMENT ENCOUNTER      Room Number: 1B/1B      HPI:    Chief complaint: Chest pain    Location: Under the ribs    Quality/Severity:  The, moderate    Timing/Duration: Waxing and waning for the last 3 weeks    Modifying Factors: It hurts to take a deep breath, feels better with the passage of time    Associated Symptoms: No headache.  No fever chills or cough.  No sore throat earache or nasal congestion.  No abdominal pain.  No diarrhea or burning when she urinates.  No nausea or vomiting.  Patient does have shortness of breath.    Narrative: Pt is a 62 y.o. female who presents complaining of pain under the rib cage.  It has been intermittent for the last 3 weeks, it is getting more severe.  Patient denies any abdominal pain.  No diarrhea or burning when she urinates.  No nausea or vomiting.  The patient has shortness of breath.      PMD: WILLIS Pate    REVIEW OF SYSTEMS  Review of Systems   Constitutional: Negative for chills and fever.   HENT: Negative for ear pain and sore throat.    Eyes: Negative for discharge and redness.   Respiratory: Negative for cough, chest tightness and shortness of breath.    Cardiovascular: Positive for chest pain. Negative for palpitations and leg swelling.   Gastrointestinal: Negative for abdominal pain, blood in stool, constipation, diarrhea, nausea and vomiting.   Genitourinary: Negative for difficulty urinating.   Musculoskeletal: Negative for arthralgias, back pain, neck pain and neck stiffness.   Skin: Negative for rash.   Neurological: Negative for headaches.   Hematological: Negative for adenopathy.   Psychiatric/Behavioral: Negative.  Negative for agitation and confusion.       PAST MEDICAL HISTORY  Active Ambulatory Problems     Diagnosis Date Noted   • Osteoarthritis of both knees 10/12/2016   • Anemia of chronic disorder 01/25/2016   • Arthralgia of multiple joints 06/16/2014   • Chronic depression 06/16/2014   • Type 2 diabetes mellitus (CMS/[facility])  01/19/2015   • Steatosis of liver 06/16/2014   • History of biliary T-tube placement 06/16/2014   • Hypothyroidism due to Hashimoto's thyroiditis 07/25/2016   • Lactose intolerance 06/16/2014   • Non-toxic multinodular goiter 06/16/2014   • Obstructive sleep apnea syndrome 07/23/2015   • Hypothyroidism 06/16/2014   • Rheumatoid arthritis involving multiple joints (CMS/HCC) 01/25/2016   • Snoring 06/16/2014   • Vitamin D deficiency 06/16/2014   • Degeneration of intervertebral disc of lumbosacral region 12/19/2016   • History of colonic polyps 12/16/2016   • Hypercholesterolemia 12/16/2016   • Morbid obesity (CMS/HCC) 12/19/2016   • Primary osteoarthritis of both knees 12/19/2016   • Seasonal allergic rhinitis 12/16/2016   • Wears eyeglasses 12/19/2016   • Gastric polyps 02/17/2017   • Internal hemorrhoids 02/17/2017   • Gastritis 02/17/2017     Resolved Ambulatory Problems     Diagnosis Date Noted   • No Resolved Ambulatory Problems     Past Medical History:   Diagnosis Date   • Anemia    • Anxiety    • Colon polyp    • Depression    • Diabetes mellitus (CMS/HCC)    • Disease of thyroid gland    • Hypertension    • Rheumatoid arthritis (CMS/HCC)        PAST SURGICAL HISTORY  Past Surgical History:   Procedure Laterality Date   • CHOLECYSTECTOMY     • COLONOSCOPY N/A 2/8/2017    Procedure: COLONOSCOPY;  Surgeon: Domi Juarez MD;  Location: [facility];  Service:    • ENDOSCOPY N/A 2/8/2017    Procedure: ESOPHAGOGASTRODUODENOSCOPY;  Surgeon: Domi Juarez MD;  Location: [facility];  Service:    • HYSTERECTOMY     • NECK SURGERY  05/05/2011       FAMILY HISTORY  Family History   Problem Relation Age of Onset   • Diabetes Mother    • Heart disease Father    • Cancer Father        SOCIAL HISTORY  Social History     Social History   • Marital status:      Spouse name: N/A   • Number of children: N/A   • Years of education: N/A     Occupational History   • Not on file.     Social History Main Topics   • Smoking  status: Former Smoker   • Smokeless tobacco: Never Used   • Alcohol use Yes      Comment: occasional   • Drug use: No   • Sexual activity: Defer     Other Topics Concern   • Not on file     Social History Narrative   • No narrative on file       ALLERGIES  Nsaids; Penicillins; and Sulfa antibiotics    PHYSICAL EXAM  ED Triage Vitals   Temp Pulse Resp BP SpO2   -- -- -- -- --      Temp src Heart Rate Source Patient Position BP Location FiO2 (%)   -- -- -- -- --       Physical Exam   Constitutional: She is oriented to person, place, and time and well-developed, well-nourished, and in no distress. No distress.   HENT:   Head: Normocephalic and atraumatic.   Right Ear: External ear normal.   Left Ear: External ear normal.   Nose: Nose normal.   Mouth/Throat: Oropharynx is clear and moist. No oropharyngeal exudate.   Eyes: Conjunctivae and EOM are normal. Pupils are equal, round, and reactive to light. Right eye exhibits no discharge. Left eye exhibits discharge. Scleral icterus is present.   Neck: Normal range of motion. Neck supple. No JVD present. No tracheal deviation present. No thyromegaly present.   Cardiovascular: Normal rate, regular rhythm, normal heart sounds and intact distal pulses.  Exam reveals no gallop and no friction rub.    No murmur heard.  Pulmonary/Chest: Effort normal and breath sounds normal. No stridor. No respiratory distress. She has no wheezes. She has no rales. She exhibits no tenderness.   Abdominal: Soft. Bowel sounds are normal. She exhibits no distension and no mass. There is no tenderness. There is no rebound and no guarding.   Musculoskeletal: Normal range of motion. She exhibits no edema, tenderness or deformity.   Lymphadenopathy:     She has no cervical adenopathy.   Neurological: She is alert and oriented to person, place, and time.   Skin: Skin is warm and dry. No rash noted. She is not diaphoretic. No erythema. No pallor.   Psychiatric: Affect normal.   Nursing note and vitals  reviewed.      LAB RESULTS  Results for orders placed or performed during the hospital encounter of 05/22/18   Tissue Pathology Exam   Result Value Ref Range    Case Report       Surgical Pathology Report                         Case: [id number]                                  Authorizing Provider:  WILLIS Pate    Collected:           05/22/2018 09:16 AM          Ordering Location:     [facility]   Received:            05/22/2018 10:41 AM                                 ULTRASOUND                                                                   Pathologist:           Raman Daly MD                                                      Specimen:    Thyroid, left upper lobe                                                                   Final Diagnosis       Testing performed at outside laboratory. See scanned report.        Embedded Images           I ordered the above labs and reviewed the results    RADIOLOGY  No results found.    I ordered the above radiologic testing and reviewed the results    PROCEDURES  Procedures      PROGRESS AND CONSULTS  ED Course as of Jul 15 0258   Sun Jul 15, 2018   0100 The laboratory values were reviewed by me.  The hemoglobin is 11.8.  The hematocrit is 35.  The glucose is 123.  The BUN is 24.  The creatinines 1.16.  The potassium 3.3.  The GFR 47.  The laboratory values are otherwise unremarkable.  [RC]   0258 The repeat troponin is negative.  [RC]      ED Course User Index  [RC] Jasper Villalobos MD     12:12 AM, 07/15/18:  The EKG was obtained at 0004.  EKG was read by me at 0005.  EKG shows a normal sinus rhythm with rate of 79.  There is a normal axis with no hypertrophy.  The NM, QRS, and QT intervals are unremarkable.  Consider left atrial abnormality.  There is a PVC.  There is artifact.  There is no acute ST elevation or depression.    2:58 AM, 07/15/18:  Patient was reassessed.  She feels better.  Her vital signs were reviewed and are  stable.  Her blood pressures improved.  Her logical exam: Conscious alert oriented ×4 with no focal deficits noted.  Abdominal exam: Soft nontender no masses positive bowel sounds.  The patient's blood pressure is 142/68.  2:59 AM, 07/15/18:  The patient's diagnosis of spigelian hernia and hypertension was discussed with the patient.  She will be given a prescription for Flexeril and lisinopril.  She should follow-up with Dr.Perenoud hardin for her hernia and with Abigail Yun is week for her elevated blood pressure.  Patient should return to emergency department if there is increasing pain, fever, vomiting, shortness of breath, worse in any way at all.  The patient's questions were answered the patient will be discharged in good condition.      MEDICAL DECISION MAKING  Results were reviewed/discussed with the patient and they were also made aware of online access. Pt also made aware that some labs, such as cultures, will not be resulted during ER visit and follow up with PMD is necessary.     MDM       DIAGNOSIS  Final diagnoses:   Spigelian hernia   Essential hypertension       Latest Documented Vital Signs:  As of 11:50 PM  BP-   HR-   Temp-   O2 sat-      DISPOSITION  Home       Medication List      No changes were made to your prescriptions during this visit.              Jasper Villalobos MD  07/15/18 6136       Jasper Villalobos MD  07/15/18 6834

## 2018-07-15 NOTE — ED NOTES
Prior to given Labetalol pt B/P pt b/p 172/71. Dr. Villalobos aware. Pt to get labetalol     Wendie Moeller RN  07/15/18 0129       Wendie Moeller RN  07/15/18 0130

## 2018-07-18 ENCOUNTER — OFFICE VISIT (OUTPATIENT)
Dept: SURGERY | Facility: CLINIC | Age: 63
End: 2018-07-18

## 2018-07-18 VITALS
HEART RATE: 72 BPM | WEIGHT: 266 LBS | SYSTOLIC BLOOD PRESSURE: 138 MMHG | RESPIRATION RATE: 20 BRPM | DIASTOLIC BLOOD PRESSURE: 82 MMHG | HEIGHT: 64 IN | BODY MASS INDEX: 45.41 KG/M2

## 2018-07-18 DIAGNOSIS — K43.9 SPIGELIAN HERNIA: Primary | ICD-10-CM

## 2018-07-18 PROCEDURE — 99204 OFFICE O/P NEW MOD 45 MIN: CPT | Performed by: SURGERY

## 2018-07-18 RX ORDER — CLINDAMYCIN PHOSPHATE 900 MG/50ML
900 INJECTION INTRAVENOUS ONCE
Status: CANCELLED | OUTPATIENT
Start: 2018-08-02 | End: 2018-08-02

## 2018-07-18 NOTE — PROGRESS NOTES
Diane Gerardo 62 y.o. female presents @ the req of [facility] for eval of Ventral hernia.Pt first noticed pain approx 3 weeks ago.  4 days ago the pain became so intense she went to ER and CT performed.  Pt states the pain is severe, + bloating, + nausea, and constipation alternating with diarrhea.  PCP WILLIS Hylton @ [facility].       HPI   Above noted and agree.  Diane has been having upper abdominal pain and bloating.  This has been going on for 3-4 weeks.  She feels like she is being squeezed.  It gets worse with increased activity.  She has alternating constipation and diarrhea.  She has IBS and sees Dr. Juarez for this.  She is tolerating a regular diet without nausea or vomiting.  She has no shortness of breath.        Review of Systems   All other systems reviewed and are negative.          Past Medical History:   Diagnosis Date   • Anemia    • Anxiety    • Colon polyp    • Depression    • Diabetes mellitus (CMS/HCC)    • Disease of thyroid gland    • Hypertension    • Rheumatoid arthritis (CMS/HCC)            Past Surgical History:   Procedure Laterality Date   • CHOLECYSTECTOMY     • COLONOSCOPY N/A 2/8/2017    Procedure: COLONOSCOPY;  Surgeon: Domi Juarez MD;  Location: [facility];  Service:    • ENDOSCOPY N/A 2/8/2017    Procedure: ESOPHAGOGASTRODUODENOSCOPY;  Surgeon: Domi Juarez MD;  Location: [facility];  Service:    • HYSTERECTOMY     • NECK SURGERY  05/05/2011           Physical Exam   Constitutional: She is oriented to person, place, and time. She appears well-developed and well-nourished.   HENT:   Head: Normocephalic and atraumatic.   Neck: Neck supple.   Cardiovascular: Normal rate and regular rhythm.    Pulmonary/Chest: Effort normal and breath sounds normal.   Abdominal: Soft. Bowel sounds are normal.   Epigastric pain and left lower quadrant pain   Musculoskeletal: She exhibits no edema or deformity.   Neurological: She is alert and oriented to person, place, and time.   Skin:  "Skin is warm and dry.   Psychiatric: She has a normal mood and affect. Her behavior is normal.   Nursing note and vitals reviewed.    I reviewed the CT scan with Diane and her .  She has a left sided spigelian hernia.  She also had a large amount of stool in her colon.      /82   Pulse 72   Resp 20   Ht 162.6 cm (64\")   Wt 121 kg (266 lb)   BMI 45.66 kg/m²         Diane was seen today for hernia.    Diagnoses and all orders for this visit:    Spigelian hernia    We will repair this laparoscopically.   The risks and benefits of repairing this hernia were discussed with this patient.  Benefits and risks, not limited to but including:  Bleeding, infection, recurrence, formation of a hematoma or seroma, injury to intra-abdominal structures, DVT, PE, atelectasis, pneumonia, anesthesia complications.  The patient appeared to understand and is willing to proceed.    Thank you for allowing me to participate in the care of this interesting patient.        "

## 2018-07-18 NOTE — H&P
Diane Gerardo 62 y.o. female presents @ the req of [facility] for eval of Ventral hernia.Pt first noticed pain approx 3 weeks ago.  4 days ago the pain became so intense she went to ER and CT performed.  Pt states the pain is severe, + bloating, + nausea, and constipation alternating with diarrhea.  PCP WILLIS Hylton @ [facility].       HPI   Above noted and agree.  Diane has been having upper abdominal pain and bloating.  This has been going on for 3-4 weeks.  She feels like she is being squeezed.  It gets worse with increased activity.  She has alternating constipation and diarrhea.  She has IBS and sees Dr. Juarez for this.  She is tolerating a regular diet without nausea or vomiting.  She has no shortness of breath.        Review of Systems   All other systems reviewed and are negative.          Past Medical History:   Diagnosis Date   • Anemia    • Anxiety    • Colon polyp    • Depression    • Diabetes mellitus (CMS/HCC)    • Disease of thyroid gland    • Hypertension    • Rheumatoid arthritis (CMS/HCC)            Past Surgical History:   Procedure Laterality Date   • CHOLECYSTECTOMY     • COLONOSCOPY N/A 2/8/2017    Procedure: COLONOSCOPY;  Surgeon: Domi Juarez MD;  Location: [facility];  Service:    • ENDOSCOPY N/A 2/8/2017    Procedure: ESOPHAGOGASTRODUODENOSCOPY;  Surgeon: Domi Juarez MD;  Location: [facility];  Service:    • HYSTERECTOMY     • NECK SURGERY  05/05/2011           Physical Exam   Constitutional: She is oriented to person, place, and time. She appears well-developed and well-nourished.   HENT:   Head: Normocephalic and atraumatic.   Neck: Neck supple.   Cardiovascular: Normal rate and regular rhythm.    Pulmonary/Chest: Effort normal and breath sounds normal.   Abdominal: Soft. Bowel sounds are normal.   Epigastric pain and left lower quadrant pain   Musculoskeletal: She exhibits no edema or deformity.   Neurological: She is alert and oriented to person, place, and time.   Skin:  "Skin is warm and dry.   Psychiatric: She has a normal mood and affect. Her behavior is normal.   Nursing note and vitals reviewed.    I reviewed the CT scan with Diane and her .  She has a left sided spigelian hernia.  She also had a large amount of stool in her colon.      /82   Pulse 72   Resp 20   Ht 162.6 cm (64\")   Wt 121 kg (266 lb)   BMI 45.66 kg/m²          Diane was seen today for hernia.    Diagnoses and all orders for this visit:    Spigelian hernia    We will repair this laparoscopically.   The risks and benefits of repairing this hernia were discussed with this patient.  Benefits and risks, not limited to but including:  Bleeding, infection, recurrence, formation of a hematoma or seroma, injury to intra-abdominal structures, DVT, PE, atelectasis, pneumonia, anesthesia complications.  The patient appeared to understand and is willing to proceed.    Thank you for allowing me to participate in the care of this interesting patient.          "

## 2018-07-20 ENCOUNTER — APPOINTMENT (OUTPATIENT)
Dept: PREADMISSION TESTING | Facility: HOSPITAL | Age: 63
End: 2018-07-20

## 2018-07-20 VITALS
HEIGHT: 64 IN | WEIGHT: 264.1 LBS | SYSTOLIC BLOOD PRESSURE: 130 MMHG | OXYGEN SATURATION: 99 % | DIASTOLIC BLOOD PRESSURE: 74 MMHG | HEART RATE: 78 BPM | RESPIRATION RATE: 16 BRPM | BODY MASS INDEX: 45.09 KG/M2

## 2018-07-20 LAB
ANION GAP SERPL CALCULATED.3IONS-SCNC: 13.3 MMOL/L
BUN BLD-MCNC: 22 MG/DL (ref 8–23)
BUN/CREAT SERPL: 23.2 (ref 7–25)
CALCIUM SPEC-SCNC: 8.9 MG/DL (ref 8.8–10.5)
CHLORIDE SERPL-SCNC: 98 MMOL/L (ref 98–107)
CO2 SERPL-SCNC: 26.7 MMOL/L (ref 22–29)
CREAT BLD-MCNC: 0.95 MG/DL (ref 0.57–1)
GFR SERPL CREATININE-BSD FRML MDRD: 60 ML/MIN/1.73
GLUCOSE BLD-MCNC: 161 MG/DL (ref 65–99)
POTASSIUM BLD-SCNC: 3.6 MMOL/L (ref 3.5–5.2)
SODIUM BLD-SCNC: 138 MMOL/L (ref 136–145)

## 2018-07-20 PROCEDURE — 80048 BASIC METABOLIC PNL TOTAL CA: CPT | Performed by: SURGERY

## 2018-07-20 PROCEDURE — 36415 COLL VENOUS BLD VENIPUNCTURE: CPT

## 2018-07-20 RX ORDER — ACETAMINOPHEN 500 MG
1000 TABLET ORAL EVERY 6 HOURS PRN
COMMUNITY
End: 2018-08-02 | Stop reason: HOSPADM

## 2018-07-20 RX ORDER — METFORMIN HYDROCHLORIDE 750 MG/1
750 TABLET, EXTENDED RELEASE ORAL
COMMUNITY
End: 2019-11-12

## 2018-07-20 NOTE — DISCHARGE INSTRUCTIONS
PRE-ADMISSION TESTING INSTRUCTIONS FOR ADULTS    Take these medications the morning of surgery with a small sip of water:  levothyroxine      No aspirin, advil, aleve, ibuprofen, naproxen, diet pills, decongestants, or herbal/vitamins for a week prior to surgery.  Stop Vitamin D, Dr Fried's office to advise on Xeljanz    General Instructions:    • Do not eat solid food after midnight the night before surgery.  No gum, mints, or hard candy after midnight the night before surgery.  • You may drink clear liquids the day of surgery up until 2 hours before your arrival time.  (Until 5:30 am)  • Clear liquids are liquids you can see through. Nothing RED in color.    Plain water    Sports drinks  Sodas     Gelatin (Jell-O)  Fruit juices without pulp such as white grape juice and apple juice  Popsicles that contain no fruit or yogurt  Tea or coffee (no cream or milk added)    • It is beneficial for you to have a clear drink that contains carbohydrates just before you leave your house and before your fasting time begins.  We suggest a 20 ounce bottle of Gatorade or Powerade for non-diabetic patients or a 20 ounce bottle of G2 or Powerade Zero for diabetic patients.     • Patients who avoid smoking, chewing tobacco and alcohol for 4 weeks prior to surgery have a reduced risk of post-operative complications.  If at all possible, quit smoking as many days before surgery as you can.    • Do not smoke, use chewing tobacco or drink alcohol the day of surgery    • Bring your C-PAP/ BI-PAP machine if you use one.  • Wear clean comfortable clothes and socks.  • Do not wear contact lenses, lotion, deodorant, or make-up.  Bring a case for your glasses if applicable. You may brush your teeth the morning of surgery.  • You may wear dentures/partials, do not put adhesive/glue on them.  • Bring crutches or walker if applicable.  • Leave all other jewelry and valuables at home.      Preventing a Surgical Site Infection:    • Shower the  night before and on the morning of surgery using the chlorhexidine soap you were given.  Use a clean washcloth with the soap.  Place clean sheets on your bed after showering the night before surgery. Do not use the CHG soap on your hair, face, or private areas. Wash your body gently for five (5) minutes. Do not scrub your skin.  Dry with a clean towel and dress in clean clothing.    • Do not shave the surgical area for 10 days-2 weeks prior to surgery  because the razor can irritate skin and make it easier to develop an infection.  • Make sure you, your family, and all healthcare providers clean their hands with soap and water or an alcohol based hand  before caring for you or your wound.      Day of surgery:    Your surgeon’s office will advise you of your arrival time for the day of surgery.    Upon arrival, a Pre-op nurse and Anesthesia provider will review your health history, obtain vital signs, and answer questions you may have.  The only belongings needed at this time will be your home medications and if applicable your C-PAP/BI-PAP machine.  If you are staying overnight your family can leave the rest of your belongings in the car and bring them to your room later.  A Pre-op nurse will start an IV and you may receive medication in preparation for surgery, including something to help you relax.  Your family will be able to see you in the Pre-op area.  While you are in surgery your family should notify the waiting room  if they leave the waiting room area and provide a contact phone number.    IF you have any questions, you can call the Pre-Admission Department at [phone] or your surgeon's office.  Notify your surgeon if  you become sick, have a fever, productive cough, or cannot be here the day of surgery    Please be aware that surgery does come with discomfort.  We want to make every effort to control your discomfort so please discuss any uncontrolled symptoms with your nurse.    Your doctor will most likely have prescribed pain medications.      If you are going home after surgery, you will receive individualized written care instructions before being discharged.  A responsible adult (over the age of 18) must drive you to and from the hospital on the day of your surgery and stay with you for 24 hours after anesthesia.    If you are staying overnight following surgery, you will be transported to your hospital room following the recovery period.  [facility] has all private rooms.    Deductibles and co-payments are collected on the day of service. Please be prepared to pay the required co-pay, deductible or deposit on the day of service as defined by your plan.    Laparoscopic Ventral (Incisional)  Hernia Repair  Laparoscopic ventral hernia repair is a surgery to fix a ventral hernia. A ventral hernia, also called an incisional hernia, is a bulge of body tissue or intestines that pushes through the front part of the abdomen. This can happen if the connective tissue covering the muscles over the abdomen has a weak spot or is torn because of a surgical cut (incision) from a previous surgery. Laparoscopic ventral hernia repair is often done soon after diagnosis to stop the hernia from getting bigger, becoming uncomfortable, or becoming an emergency. This surgery usually takes about 2 hours, but the time can vary greatly.  LET YOUR HEALTH CARE PROVIDER KNOW ABOUT:  · Any allergies you have.  · All medicines you are taking, including steroids, vitamins, herbs, eye drops, creams, and over-the-counter medicines.  · Previous problems you or members of your family have had with the use of anesthetics.  · Any blood disorders you have.  · Previous surgeries you have had.  · Medical conditions you have.  RISKS AND COMPLICATIONS   Generally, laparoscopic ventral hernia repair is a safe procedure. However, as with any surgical procedure, problems can occur. Possible problems  include:  · Bleeding.  · Trouble passing urine or having a bowel movement after the surgery.  · Infection.  · Pneumonia.  · Blood clots.  · Pain in the area of the hernia.  · A bulge in the area of the hernia that may be caused by a collection of fluid.  · Injury to intestines or other structures in the abdomen.  · Return of the hernia after surgery.  In some cases, your health care provider may need to stop the laparoscopic procedure and do regular, open surgery. This may be necessary for very difficult hernias, when organs are hard to see, or when bleeding problems occur during surgery.  BEFORE THE PROCEDURE   · You may need to have blood tests, urine tests, a chest X-ray, or an electrocardiogram done before the day of the surgery.  · Ask your health care provider about changing or stopping your regular medicines. This is especially important if you are taking diabetes medicines or blood thinners.  · You may need to wash with a special type of germ-killing soap.  · Do not eat or drink anything after midnight the night before the procedure or as directed by your health care provider.  · Make plans to have someone drive you home after the procedure.  PROCEDURE   · Small monitors will be put on your body. They are used to check your heart, blood pressure, and oxygen level.  · An IV access tube will be put into a vein in your hand or arm. Fluids and medicine will flow directly into your body through the IV tube.  · You will be given medicine that makes you go to sleep (general anesthetic).  · Your abdomen will be cleaned with a special soap to kill any germs on your skin.  · Once you are asleep, several small incisions will be made in your abdomen.  · The large space in your abdomen will be filled with air so that it expands. This gives your health care provider more room and a better view.  · A thin, lighted tube with a tiny camera on the end (laparoscope) is put through a small incision in your abdomen. The camera  on the laparoscope sends a picture to a TV screen in the operating room. This gives your health care provider a good view inside your abdomen.  · Hollow tubes are put through the other small incisions in your abdomen. The tools needed for the procedure are put through these tubes.  · Your health care provider puts the tissue or intestines that formed the hernia back in place.  · A screen-like patch (mesh) is used to close the hernia. This helps make the area stronger. Stitches, tacks, or surgical staples are used to keep the mesh in place.  · Medicine and a bandage (dressing) or skin glue will be put over the incisions.  AFTER THE PROCEDURE   · You will stay in a recovery area until the anesthetic wears off. Your blood pressure and pulse will be checked often.  · You may be able to go home the same day or may need to stay in the hospital for 1-2 days after surgery. Your health care provider will decide when you can go home.  · You may feel some pain. You may be given medicine for pain.  · You will be urged to do breathing exercises that involve taking deep breaths. This helps prevent a lung infection after a surgery.  · You may have to wear compression stockings while you are in the hospital. These stockings help keep blood clots from forming in your legs.     This information is not intended to replace advice given to you by your health care provider. Make sure you discuss any questions you have with your health care provider.     Document Released: 12/04/2013 Document Revised: 12/23/2014 Document Reviewed: 12/04/2013  [employer]® Patient Information ©2016 [employer].

## 2018-07-20 NOTE — PAT
Pt here for PAT visit.  Preop tests completed, chg soap given, and instructions reviewed.  Instructed clears until 2 hrs prior to arrival time dos, voiced understanding.  Spoke w/Nicci at Dr Fride's office re: surgery time/arrival time, office will advise patient on Monday.  Also asked for office to check w/Dr Fried about Xeljanz, office to advise patient if she needs to stop it for surgery.

## 2018-08-01 ENCOUNTER — ANESTHESIA EVENT (OUTPATIENT)
Dept: PERIOP | Facility: HOSPITAL | Age: 63
End: 2018-08-01

## 2018-08-02 ENCOUNTER — ANESTHESIA (OUTPATIENT)
Dept: PERIOP | Facility: HOSPITAL | Age: 63
End: 2018-08-02

## 2018-08-02 ENCOUNTER — HOSPITAL ENCOUNTER (OUTPATIENT)
Facility: HOSPITAL | Age: 63
Setting detail: HOSPITAL OUTPATIENT SURGERY
Discharge: HOME | End: 2018-08-02
Attending: SURGERY | Admitting: NURSE ANESTHETIST, CERTIFIED REGISTERED

## 2018-08-02 VITALS
SYSTOLIC BLOOD PRESSURE: 136 MMHG | HEART RATE: 71 BPM | DIASTOLIC BLOOD PRESSURE: 65 MMHG | RESPIRATION RATE: 14 BRPM | WEIGHT: 264.2 LBS | OXYGEN SATURATION: 96 % | TEMPERATURE: 98.6 F | BODY MASS INDEX: 45.35 KG/M2

## 2018-08-02 DIAGNOSIS — K43.9 SPIGELIAN HERNIA: ICD-10-CM

## 2018-08-02 LAB
GLUCOSE BLDC GLUCOMTR-MCNC: 138 MG/DL (ref 70–130)
POTASSIUM BLD-SCNC: 3.9 MMOL/L (ref 3.5–5.2)

## 2018-08-02 PROCEDURE — 49652 PR LAP, VENTRAL HERNIA REPAIR,REDUCIBLE: CPT | Performed by: SURGERY

## 2018-08-02 PROCEDURE — 25010000002 FENTANYL CITRATE (PF) 100 MCG/2ML SOLUTION: Performed by: ANESTHESIOLOGY

## 2018-08-02 PROCEDURE — 25010000002 DEXAMETHASONE PER 1 MG: Performed by: NURSE ANESTHETIST, CERTIFIED REGISTERED

## 2018-08-02 PROCEDURE — 25010000002 MIDAZOLAM PER 1 MG: Performed by: NURSE ANESTHETIST, CERTIFIED REGISTERED

## 2018-08-02 PROCEDURE — 25010000002 NEOSTIGMINE PER 0.5 MG: Performed by: ANESTHESIOLOGY

## 2018-08-02 PROCEDURE — 25010000002 SUCCINYLCHOLINE PER 20 MG: Performed by: ANESTHESIOLOGY

## 2018-08-02 PROCEDURE — 25010000002 DIPHENHYDRAMINE PER 50 MG: Performed by: NURSE ANESTHETIST, CERTIFIED REGISTERED

## 2018-08-02 PROCEDURE — 25010000002 PROPOFOL 10 MG/ML EMULSION: Performed by: ANESTHESIOLOGY

## 2018-08-02 PROCEDURE — C1781 MESH (IMPLANTABLE): HCPCS | Performed by: SURGERY

## 2018-08-02 PROCEDURE — 25010000002 ONDANSETRON PER 1 MG: Performed by: NURSE ANESTHETIST, CERTIFIED REGISTERED

## 2018-08-02 PROCEDURE — 84132 ASSAY OF SERUM POTASSIUM: CPT | Performed by: NURSE ANESTHETIST, CERTIFIED REGISTERED

## 2018-08-02 PROCEDURE — 82962 GLUCOSE BLOOD TEST: CPT

## 2018-08-02 PROCEDURE — 25010000002 HYDROMORPHONE PER 4 MG: Performed by: ANESTHESIOLOGY

## 2018-08-02 DEVICE — VENTRALEX ST HERNIA PATCH
Type: IMPLANTABLE DEVICE | Site: ABDOMEN | Status: FUNCTIONAL
Brand: VENTRALEX ST HERNIA PATCH

## 2018-08-02 RX ORDER — LIDOCAINE HYDROCHLORIDE 20 MG/ML
INJECTION, SOLUTION INFILTRATION; PERINEURAL AS NEEDED
Status: DISCONTINUED | OUTPATIENT
Start: 2018-08-02 | End: 2018-08-02 | Stop reason: SURG

## 2018-08-02 RX ORDER — ONDANSETRON 2 MG/ML
4 INJECTION INTRAMUSCULAR; INTRAVENOUS ONCE AS NEEDED
Status: COMPLETED | OUTPATIENT
Start: 2018-08-02 | End: 2018-08-02

## 2018-08-02 RX ORDER — ONDANSETRON 2 MG/ML
4 INJECTION INTRAMUSCULAR; INTRAVENOUS ONCE AS NEEDED
Status: DISCONTINUED | OUTPATIENT
Start: 2018-08-02 | End: 2018-08-02 | Stop reason: HOSPADM

## 2018-08-02 RX ORDER — SODIUM CHLORIDE 0.9 % (FLUSH) 0.9 %
1-10 SYRINGE (ML) INJECTION AS NEEDED
Status: DISCONTINUED | OUTPATIENT
Start: 2018-08-02 | End: 2018-08-02 | Stop reason: HOSPADM

## 2018-08-02 RX ORDER — MIDAZOLAM HYDROCHLORIDE 1 MG/ML
1 INJECTION INTRAMUSCULAR; INTRAVENOUS
Status: DISCONTINUED | OUTPATIENT
Start: 2018-08-02 | End: 2018-08-02 | Stop reason: HOSPADM

## 2018-08-02 RX ORDER — PROPOFOL 10 MG/ML
VIAL (ML) INTRAVENOUS AS NEEDED
Status: DISCONTINUED | OUTPATIENT
Start: 2018-08-02 | End: 2018-08-02 | Stop reason: SURG

## 2018-08-02 RX ORDER — HYDROCODONE BITARTRATE AND ACETAMINOPHEN 5; 325 MG/1; MG/1
1 TABLET ORAL ONCE
Status: COMPLETED | OUTPATIENT
Start: 2018-08-02 | End: 2018-08-02

## 2018-08-02 RX ORDER — HYDROCODONE BITARTRATE AND ACETAMINOPHEN 5; 325 MG/1; MG/1
1 TABLET ORAL EVERY 4 HOURS PRN
Qty: 60 TABLET | Refills: 0 | Status: SHIPPED | OUTPATIENT
Start: 2018-08-02 | End: 2019-05-15

## 2018-08-02 RX ORDER — MEPERIDINE HYDROCHLORIDE 25 MG/ML
12.5 INJECTION INTRAMUSCULAR; INTRAVENOUS; SUBCUTANEOUS
Status: DISCONTINUED | OUTPATIENT
Start: 2018-08-02 | End: 2018-08-02 | Stop reason: HOSPADM

## 2018-08-02 RX ORDER — SODIUM CHLORIDE 9 MG/ML
40 INJECTION, SOLUTION INTRAVENOUS AS NEEDED
Status: DISCONTINUED | OUTPATIENT
Start: 2018-08-02 | End: 2018-08-02 | Stop reason: HOSPADM

## 2018-08-02 RX ORDER — ROCURONIUM BROMIDE 10 MG/ML
INJECTION, SOLUTION INTRAVENOUS AS NEEDED
Status: DISCONTINUED | OUTPATIENT
Start: 2018-08-02 | End: 2018-08-02 | Stop reason: SURG

## 2018-08-02 RX ORDER — HYDROMORPHONE HYDROCHLORIDE 1 MG/ML
0.5 INJECTION, SOLUTION INTRAMUSCULAR; INTRAVENOUS; SUBCUTANEOUS AS NEEDED
Status: DISCONTINUED | OUTPATIENT
Start: 2018-08-02 | End: 2018-08-02 | Stop reason: HOSPADM

## 2018-08-02 RX ORDER — SODIUM CHLORIDE, SODIUM LACTATE, POTASSIUM CHLORIDE, CALCIUM CHLORIDE 600; 310; 30; 20 MG/100ML; MG/100ML; MG/100ML; MG/100ML
9 INJECTION, SOLUTION INTRAVENOUS CONTINUOUS
Status: DISCONTINUED | OUTPATIENT
Start: 2018-08-02 | End: 2018-08-02 | Stop reason: HOSPADM

## 2018-08-02 RX ORDER — CLINDAMYCIN PHOSPHATE 900 MG/50ML
900 INJECTION INTRAVENOUS ONCE
Status: COMPLETED | OUTPATIENT
Start: 2018-08-02 | End: 2018-08-02

## 2018-08-02 RX ORDER — MIDAZOLAM HYDROCHLORIDE 1 MG/ML
2 INJECTION INTRAMUSCULAR; INTRAVENOUS
Status: DISCONTINUED | OUTPATIENT
Start: 2018-08-02 | End: 2018-08-02 | Stop reason: HOSPADM

## 2018-08-02 RX ORDER — LIDOCAINE HYDROCHLORIDE 10 MG/ML
0.5 INJECTION, SOLUTION EPIDURAL; INFILTRATION; INTRACAUDAL; PERINEURAL ONCE AS NEEDED
Status: COMPLETED | OUTPATIENT
Start: 2018-08-02 | End: 2018-08-02

## 2018-08-02 RX ORDER — FENTANYL CITRATE 50 UG/ML
INJECTION, SOLUTION INTRAMUSCULAR; INTRAVENOUS AS NEEDED
Status: DISCONTINUED | OUTPATIENT
Start: 2018-08-02 | End: 2018-08-02 | Stop reason: SURG

## 2018-08-02 RX ORDER — MAGNESIUM HYDROXIDE 1200 MG/15ML
LIQUID ORAL AS NEEDED
Status: DISCONTINUED | OUTPATIENT
Start: 2018-08-02 | End: 2018-08-02 | Stop reason: HOSPADM

## 2018-08-02 RX ORDER — DEXAMETHASONE SODIUM PHOSPHATE 4 MG/ML
8 INJECTION, SOLUTION INTRA-ARTICULAR; INTRALESIONAL; INTRAMUSCULAR; INTRAVENOUS; SOFT TISSUE ONCE AS NEEDED
Status: COMPLETED | OUTPATIENT
Start: 2018-08-02 | End: 2018-08-02

## 2018-08-02 RX ORDER — DIPHENHYDRAMINE HYDROCHLORIDE 50 MG/ML
12.5 INJECTION INTRAMUSCULAR; INTRAVENOUS ONCE
Status: COMPLETED | OUTPATIENT
Start: 2018-08-02 | End: 2018-08-02

## 2018-08-02 RX ORDER — SUCCINYLCHOLINE CHLORIDE 20 MG/ML
INJECTION INTRAMUSCULAR; INTRAVENOUS AS NEEDED
Status: DISCONTINUED | OUTPATIENT
Start: 2018-08-02 | End: 2018-08-02 | Stop reason: SURG

## 2018-08-02 RX ORDER — LIDOCAINE HYDROCHLORIDE 10 MG/ML
INJECTION, SOLUTION EPIDURAL; INFILTRATION; INTRACAUDAL; PERINEURAL AS NEEDED
Status: DISCONTINUED | OUTPATIENT
Start: 2018-08-02 | End: 2018-08-02 | Stop reason: HOSPADM

## 2018-08-02 RX ORDER — SODIUM CHLORIDE, SODIUM LACTATE, POTASSIUM CHLORIDE, CALCIUM CHLORIDE 600; 310; 30; 20 MG/100ML; MG/100ML; MG/100ML; MG/100ML
100 INJECTION, SOLUTION INTRAVENOUS CONTINUOUS
Status: DISCONTINUED | OUTPATIENT
Start: 2018-08-02 | End: 2018-08-02 | Stop reason: HOSPADM

## 2018-08-02 RX ORDER — BUPIVACAINE HYDROCHLORIDE AND EPINEPHRINE 5; 5 MG/ML; UG/ML
INJECTION, SOLUTION PERINEURAL AS NEEDED
Status: DISCONTINUED | OUTPATIENT
Start: 2018-08-02 | End: 2018-08-02 | Stop reason: HOSPADM

## 2018-08-02 RX ORDER — GLYCOPYRROLATE 0.2 MG/ML
INJECTION INTRAMUSCULAR; INTRAVENOUS AS NEEDED
Status: DISCONTINUED | OUTPATIENT
Start: 2018-08-02 | End: 2018-08-02 | Stop reason: SURG

## 2018-08-02 RX ADMIN — PROPOFOL 200 MG: 10 INJECTION, EMULSION INTRAVENOUS at 08:12

## 2018-08-02 RX ADMIN — FENTANYL CITRATE 50 MCG: 50 INJECTION, SOLUTION INTRAMUSCULAR; INTRAVENOUS at 08:09

## 2018-08-02 RX ADMIN — FENTANYL CITRATE 50 MCG: 50 INJECTION, SOLUTION INTRAMUSCULAR; INTRAVENOUS at 08:41

## 2018-08-02 RX ADMIN — DEXAMETHASONE SODIUM PHOSPHATE 8 MG: 4 INJECTION, SOLUTION INTRAMUSCULAR; INTRAVENOUS at 07:49

## 2018-08-02 RX ADMIN — FAMOTIDINE 20 MG: 10 INJECTION, SOLUTION INTRAVENOUS at 07:46

## 2018-08-02 RX ADMIN — MIDAZOLAM HYDROCHLORIDE 1 MG: 1 INJECTION, SOLUTION INTRAMUSCULAR; INTRAVENOUS at 07:50

## 2018-08-02 RX ADMIN — ONDANSETRON 4 MG: 2 INJECTION, SOLUTION INTRAMUSCULAR; INTRAVENOUS at 07:46

## 2018-08-02 RX ADMIN — DIPHENHYDRAMINE HYDROCHLORIDE 12.5 MG: 50 INJECTION, SOLUTION INTRAMUSCULAR; INTRAVENOUS at 07:48

## 2018-08-02 RX ADMIN — ROCURONIUM BROMIDE 25 MG: 10 INJECTION INTRAVENOUS at 08:24

## 2018-08-02 RX ADMIN — NEOSTIGMINE METHYLSULFATE 3 MG: 1 INJECTION, SOLUTION INTRAMUSCULAR; INTRAVENOUS; SUBCUTANEOUS at 09:36

## 2018-08-02 RX ADMIN — CLINDAMYCIN IN 5 PERCENT DEXTROSE 900 MG: 18 INJECTION, SOLUTION INTRAVENOUS at 08:07

## 2018-08-02 RX ADMIN — LIDOCAINE HYDROCHLORIDE 100 MG: 20 INJECTION, SOLUTION INFILTRATION; PERINEURAL at 08:12

## 2018-08-02 RX ADMIN — SODIUM CHLORIDE, POTASSIUM CHLORIDE, SODIUM LACTATE AND CALCIUM CHLORIDE 9 ML/HR: 600; 310; 30; 20 INJECTION, SOLUTION INTRAVENOUS at 06:10

## 2018-08-02 RX ADMIN — HYDROMORPHONE HYDROCHLORIDE 1 MG: 1 INJECTION, SOLUTION INTRAMUSCULAR; INTRAVENOUS; SUBCUTANEOUS at 10:45

## 2018-08-02 RX ADMIN — GLYCOPYRROLATE 0.4 MG: 0.2 INJECTION INTRAMUSCULAR; INTRAVENOUS at 09:36

## 2018-08-02 RX ADMIN — SUCCINYLCHOLINE CHLORIDE 140 MG: 20 INJECTION, SOLUTION INTRAMUSCULAR; INTRAVENOUS at 08:12

## 2018-08-02 RX ADMIN — LIDOCAINE HYDROCHLORIDE 0.1 ML: 10 INJECTION, SOLUTION EPIDURAL; INFILTRATION; INTRACAUDAL; PERINEURAL at 06:10

## 2018-08-02 RX ADMIN — FENTANYL CITRATE 50 MCG: 50 INJECTION, SOLUTION INTRAMUSCULAR; INTRAVENOUS at 09:25

## 2018-08-02 RX ADMIN — GLYCOPYRROLATE 0.2 MG: 0.2 INJECTION INTRAMUSCULAR; INTRAVENOUS at 08:09

## 2018-08-02 RX ADMIN — ROCURONIUM BROMIDE 5 MG: 10 INJECTION INTRAVENOUS at 08:10

## 2018-08-02 RX ADMIN — HYDROMORPHONE HYDROCHLORIDE 1 MG: 1 INJECTION, SOLUTION INTRAMUSCULAR; INTRAVENOUS; SUBCUTANEOUS at 10:24

## 2018-08-02 RX ADMIN — HYDROCODONE BITARTRATE AND ACETAMINOPHEN 1 TABLET: 5; 325 TABLET ORAL at 11:39

## 2018-08-02 NOTE — H&P (VIEW-ONLY)
Diane Gerardo 62 y.o. female presents @ the req of [facility] for eval of Ventral hernia.Pt first noticed pain approx 3 weeks ago.  4 days ago the pain became so intense she went to ER and CT performed.  Pt states the pain is severe, + bloating, + nausea, and constipation alternating with diarrhea.  PCP WILLIS Hylton @ [facility].       HPI   Above noted and agree.  Diane has been having upper abdominal pain and bloating.  This has been going on for 3-4 weeks.  She feels like she is being squeezed.  It gets worse with increased activity.  She has alternating constipation and diarrhea.  She has IBS and sees Dr. Juarez for this.  She is tolerating a regular diet without nausea or vomiting.  She has no shortness of breath.        Review of Systems   All other systems reviewed and are negative.          Past Medical History:   Diagnosis Date   • Anemia    • Anxiety    • Colon polyp    • Depression    • Diabetes mellitus (CMS/HCC)    • Disease of thyroid gland    • Hypertension    • Rheumatoid arthritis (CMS/HCC)            Past Surgical History:   Procedure Laterality Date   • CHOLECYSTECTOMY     • COLONOSCOPY N/A 2/8/2017    Procedure: COLONOSCOPY;  Surgeon: Domi Juarez MD;  Location: [facility];  Service:    • ENDOSCOPY N/A 2/8/2017    Procedure: ESOPHAGOGASTRODUODENOSCOPY;  Surgeon: Domi Juarez MD;  Location: [facility];  Service:    • HYSTERECTOMY     • NECK SURGERY  05/05/2011           Physical Exam   Constitutional: She is oriented to person, place, and time. She appears well-developed and well-nourished.   HENT:   Head: Normocephalic and atraumatic.   Neck: Neck supple.   Cardiovascular: Normal rate and regular rhythm.    Pulmonary/Chest: Effort normal and breath sounds normal.   Abdominal: Soft. Bowel sounds are normal.   Epigastric pain and left lower quadrant pain   Musculoskeletal: She exhibits no edema or deformity.   Neurological: She is alert and oriented to person, place, and time.   Skin:  "Skin is warm and dry.   Psychiatric: She has a normal mood and affect. Her behavior is normal.   Nursing note and vitals reviewed.    I reviewed the CT scan with Diane and her .  She has a left sided spigelian hernia.  She also had a large amount of stool in her colon.      /82   Pulse 72   Resp 20   Ht 162.6 cm (64\")   Wt 121 kg (266 lb)   BMI 45.66 kg/m²         Diane was seen today for hernia.    Diagnoses and all orders for this visit:    Spigelian hernia    We will repair this laparoscopically.   The risks and benefits of repairing this hernia were discussed with this patient.  Benefits and risks, not limited to but including:  Bleeding, infection, recurrence, formation of a hematoma or seroma, injury to intra-abdominal structures, DVT, PE, atelectasis, pneumonia, anesthesia complications.  The patient appeared to understand and is willing to proceed.    Thank you for allowing me to participate in the care of this interesting patient.        "

## 2018-08-02 NOTE — ANESTHESIA PREPROCEDURE EVALUATION
" Anesthesia Evaluation     Patient summary reviewed and Nursing notes reviewed   no history of anesthetic complications:  NPO Solid Status: > 8 hours  NPO Liquid Status: > 2 hours           Airway   Mallampati: I  TM distance: >3 FB  Neck ROM: limited  Possible difficult intubation  Dental - normal exam         Pulmonary - normal exam    breath sounds clear to auscultation  (+) a smoker (quit 10 years ago) Former, sleep apnea on CPAP,   Cardiovascular - normal exam    ECG reviewed  Rhythm: regular  Rate: normal    (+) hypertension (no meds. states  told her to \"hold off' on filling prescription. ), valvular problems/murmurs MVP,   Dysrhythmias: occ. palpitations.      Neuro/Psych  (+) psychiatric history Anxiety and Depression,     GI/Hepatic/Renal/Endo    (+) obesity, morbid obesity,  diabetes mellitus type 2 well controlled, hypothyroidism,     Musculoskeletal     (+) back pain (DDD lumbar spine), chronic pain, neck pain, neck stiffness,   Abdominal   (+) obese,    Substance History   (+) alcohol use (occ),      OB/GYN          Other   (+) arthritis                     Anesthesia Plan    ASA 3     general     intravenous induction   Anesthetic plan and risks discussed with patient.  Use of blood products discussed with patient  Consented to blood products.     "

## 2018-08-02 NOTE — INTERVAL H&P NOTE
H&P reviewed. The patient was examined and there are no changes to the H&P.       /80 (BP Location: Left arm, Patient Position: Lying)   Pulse 71   Temp 98 °F (36.7 °C) (Oral)   Resp 12   Wt 120 kg (264 lb 3.2 oz)   SpO2 96%   BMI 45.35 kg/m²

## 2018-08-02 NOTE — ANESTHESIA POSTPROCEDURE EVALUATION
Patient: Diane Gerardo    Procedure Summary     Date:  08/02/18 Room / Location:   [facility] OR 1 /  [facility]    Anesthesia Start:  0803 Anesthesia Stop:  0953    Procedure:  VENTRAL AND SPIGELIAN HERNIA REPAIR LAPAROSCOPIC (N/A Abdomen) Diagnosis:       Spigelian hernia      (Spigelian hernia [K43.9])    Surgeon:  Erin Fried DO Provider:  Kusum Mccray MD    Anesthesia Type:  general ASA Status:  3          Anesthesia Type: general  Last vitals  BP   142/63 (08/02/18 1107)   Temp   97.9 °F (36.6 °C) (08/02/18 0953)   Pulse   79 (08/02/18 1107)   Resp   12 (08/02/18 1107)     SpO2   95 % (08/02/18 1107)     Post Anesthesia Care and Evaluation    Patient location during evaluation: bedside  Patient participation: complete - patient participated  Level of consciousness: awake and alert  Pain score: 1  Pain management: adequate  Airway patency: patent  Anesthetic complications: No anesthetic complications    Cardiovascular status: acceptable  Respiratory status: acceptable  Hydration status: acceptable

## 2018-08-02 NOTE — ANESTHESIA PROCEDURE NOTES
Airway  Urgency: elective    Date/Time: 8/2/2018 8:14 AM  End Time:8/2/2018 8:16 AM  Airway not difficult    General Information and Staff    Patient location during procedure: OR  Anesthesiologist: CHANEL HENRIQUEZ    Indications and Patient Condition  Indications for airway management: airway protection    Preoxygenated: yes  MILS maintained throughout  Mask difficulty assessment: 1 - vent by mask    Final Airway Details  Final airway type: endotracheal airway      Successful airway: ETT  Cuffed: yes   Successful intubation technique: video laryngoscopy  Facilitating devices/methods: intubating stylet and cricoid pressure  Endotracheal tube insertion site: oral  Blade: CMAC  Blade size: #4  ETT size: 7.0 (Attempted to place 7.5 ETT-unable to pass.  Able to pass 7.0 ETT easily.) mm  Cormack-Lehane Classification: grade I - full view of glottis  Placement verified by: chest auscultation and capnometry   Measured from: lips  ETT to lips (cm): 21  Number of attempts at approach: 2    Additional Comments  Atraumatic intubation, teeth and gums as before.  Equal BBS.

## 2018-08-02 NOTE — OP NOTE
GENERAL SURGERY :  Fariha  Diane Akin  1955    Procedure Date: 08/02/18    Pre-op Diagnosis: Spigelian hernia (left lower quadrant)    Post-op Diagnosis: Spigelian hernia and low midline ventral hernia    Procedure: laparoscopic repair of Spigelian hernia and low midline ventral hernia    Surgeon: Fariha    Assistant: Jorge Levine    Estimated Blood Loss:  10 ml    Complications:  none    Findings: Diane had some midline adhesions.  She had a small left sided Spigelian hernia and a small low midline ventral hernia, and some adhesions were found in the low midline. The remainder of her intra-abdominal contents appeared normal.    Clinical Note: Diane was referred with the aforementioned complaints.   The risks and benefits of repairing this hernia were discussed with this patient.  Benefits and risks, not limited to but including:  Bleeding, infection, recurrence, formation of a hematoma or seroma, injury to intra-abdominal structures, DVT, PE, atelectasis, pneumonia, anesthesia complications.  Diane appeared to understand the benefits and risks and signed informed consent.    Procedure:  Diane was taken to the operative suite at [facility].  She was placed under a general anesthesia with ET tube intubation.  She had a joe catheter placed.  She was prepped and draped in the usual sterile fashion.  An incision was made above the umbilicus.  Using an open Gardner technique a 12 mm Gardner trocar was introduced into the abdomen, the abdomen was inflated, a camera was inserted, and the aforementioned findings were noted.  One additional 5 mm port was placed under direct vision.  Using blunt dissection the adhesions were taken down.  The patient was then placed in the Trendelenburg position and a small left sided Spigelian hernia was noted.  Using an endoclose, the hernia was repaired primarily using 0 Ethibond suture.  We then placed a large Ventralex mesh into the abdomen and using absorbable  tacks the mesh was tacked over the hernia.  We then noticed another small hernia in the low midline where the adhesions had been.  Again using the endoclose the hernia was repaired primarily and then another large Ventralex mesh was tacked over the area.  Local was then injected.  Omentum was then placed over the bowel.  The 5 mm port was removed under direct vision and was watched the air deflate from the abdomen.  The mesh was tacked well and there were no more hernias noted.  The Gardner port was removed.  The fascia was closed and then the wounds were closed.  More local was injected.  Steri strips and sterile dressings were applied.  Diane then had her anesthesia reversed, her ET tube and joe were removed, and she was taken to the recovery area in stable condition having tolerated her procedure well.        Erin Fried DO  9:37 AM

## 2018-08-05 ENCOUNTER — APPOINTMENT (OUTPATIENT)
Dept: CT IMAGING | Facility: HOSPITAL | Age: 63
End: 2018-08-05

## 2018-08-05 ENCOUNTER — HOSPITAL ENCOUNTER (EMERGENCY)
Facility: HOSPITAL | Age: 63
Discharge: HOME | End: 2018-08-05
Attending: EMERGENCY MEDICINE | Admitting: EMERGENCY MEDICINE

## 2018-08-05 DIAGNOSIS — R10.32 ABDOMINAL WALL PAIN IN LEFT LOWER QUADRANT: Primary | ICD-10-CM

## 2018-08-05 LAB
ALBUMIN SERPL-MCNC: 3.6 G/DL (ref 3.5–5.2)
ALBUMIN/GLOB SERPL: 1.1 G/DL
ALP SERPL-CCNC: 114 U/L (ref 40–129)
ALT SERPL W P-5'-P-CCNC: 29 U/L (ref 5–33)
ANION GAP SERPL CALCULATED.3IONS-SCNC: 8 MMOL/L
AST SERPL-CCNC: 23 U/L (ref 5–32)
BASOPHILS # BLD AUTO: 0.02 10*3/MM3 (ref 0–0.2)
BASOPHILS NFR BLD AUTO: 0.3 % (ref 0–2)
BILIRUB SERPL-MCNC: 0.3 MG/DL (ref 0.2–1.2)
BILIRUB UR QL STRIP: NEGATIVE
BUN BLD-MCNC: 12 MG/DL (ref 8–23)
BUN/CREAT SERPL: 12.8 (ref 7–25)
CALCIUM SPEC-SCNC: 8.9 MG/DL (ref 8.8–10.5)
CHLORIDE SERPL-SCNC: 100 MMOL/L (ref 98–107)
CLARITY UR: CLEAR
CO2 SERPL-SCNC: 30 MMOL/L (ref 22–29)
COLOR UR: YELLOW
CREAT BLD-MCNC: 0.94 MG/DL (ref 0.57–1)
DEPRECATED RDW RBC AUTO: 42.7 FL (ref 37–54)
EOSINOPHIL # BLD AUTO: 0.14 10*3/MM3 (ref 0.1–0.3)
EOSINOPHIL NFR BLD AUTO: 2.1 % (ref 0–4)
ERYTHROCYTE [DISTWIDTH] IN BLOOD BY AUTOMATED COUNT: 13.7 % (ref 11.5–14.5)
GFR SERPL CREATININE-BSD FRML MDRD: 61 ML/MIN/1.73
GLOBULIN UR ELPH-MCNC: 3.3 GM/DL
GLUCOSE BLD-MCNC: 150 MG/DL (ref 65–99)
GLUCOSE UR STRIP-MCNC: NEGATIVE MG/DL
HCT VFR BLD AUTO: 36.4 % (ref 37–47)
HGB BLD-MCNC: 11.9 G/DL (ref 12–16)
HGB UR QL STRIP.AUTO: NEGATIVE
IMM GRANULOCYTES # BLD: 0.03 10*3/MM3 (ref 0–0.03)
IMM GRANULOCYTES NFR BLD: 0.4 % (ref 0–0.5)
KETONES UR QL STRIP: NEGATIVE
LEUKOCYTE ESTERASE UR QL STRIP.AUTO: NEGATIVE
LYMPHOCYTES # BLD AUTO: 1.6 10*3/MM3 (ref 0.6–4.8)
LYMPHOCYTES NFR BLD AUTO: 23.8 % (ref 20–45)
MCH RBC QN AUTO: 27.7 PG (ref 27–31)
MCHC RBC AUTO-ENTMCNC: 32.7 G/DL (ref 31–37)
MCV RBC AUTO: 84.8 FL (ref 81–99)
MONOCYTES # BLD AUTO: 0.6 10*3/MM3 (ref 0–1)
MONOCYTES NFR BLD AUTO: 8.9 % (ref 3–8)
NEUTROPHILS # BLD AUTO: 4.33 10*3/MM3 (ref 1.5–8.3)
NEUTROPHILS NFR BLD AUTO: 64.5 % (ref 45–70)
NITRITE UR QL STRIP: NEGATIVE
NRBC BLD MANUAL-RTO: 0 /100 WBC (ref 0–0)
PH UR STRIP.AUTO: 6.5 [PH] (ref 4.5–8)
PLATELET # BLD AUTO: 252 10*3/MM3 (ref 140–500)
PMV BLD AUTO: 9.8 FL (ref 7.4–10.4)
POTASSIUM BLD-SCNC: 4 MMOL/L (ref 3.5–5.2)
PROT SERPL-MCNC: 6.9 G/DL (ref 6–8.5)
PROT UR QL STRIP: NEGATIVE
RBC # BLD AUTO: 4.29 10*6/MM3 (ref 4.2–5.4)
SODIUM BLD-SCNC: 138 MMOL/L (ref 136–145)
SP GR UR STRIP: 1.01 (ref 1–1.03)
UROBILINOGEN UR QL STRIP: NORMAL
WBC NRBC COR # BLD: 6.72 10*3/MM3 (ref 4.8–10.8)

## 2018-08-05 PROCEDURE — 74177 CT ABD & PELVIS W/CONTRAST: CPT

## 2018-08-05 PROCEDURE — 80053 COMPREHEN METABOLIC PANEL: CPT | Performed by: EMERGENCY MEDICINE

## 2018-08-05 PROCEDURE — 96361 HYDRATE IV INFUSION ADD-ON: CPT

## 2018-08-05 PROCEDURE — 85025 COMPLETE CBC W/AUTO DIFF WBC: CPT | Performed by: EMERGENCY MEDICINE

## 2018-08-05 PROCEDURE — P9612 CATHETERIZE FOR URINE SPEC: HCPCS

## 2018-08-05 PROCEDURE — 96374 THER/PROPH/DIAG INJ IV PUSH: CPT

## 2018-08-05 PROCEDURE — 25010000002 HYDROMORPHONE PER 4 MG: Performed by: EMERGENCY MEDICINE

## 2018-08-05 PROCEDURE — 0 IOPAMIDOL PER 1 ML: Performed by: EMERGENCY MEDICINE

## 2018-08-05 PROCEDURE — 99282 EMERGENCY DEPT VISIT SF MDM: CPT | Performed by: EMERGENCY MEDICINE

## 2018-08-05 PROCEDURE — 99283 EMERGENCY DEPT VISIT LOW MDM: CPT

## 2018-08-05 PROCEDURE — 81003 URINALYSIS AUTO W/O SCOPE: CPT | Performed by: EMERGENCY MEDICINE

## 2018-08-05 RX ORDER — HYDROMORPHONE HCL 110MG/55ML
0.5 PATIENT CONTROLLED ANALGESIA SYRINGE INTRAVENOUS ONCE
Status: COMPLETED | OUTPATIENT
Start: 2018-08-05 | End: 2018-08-05

## 2018-08-05 RX ORDER — SODIUM CHLORIDE 0.9 % (FLUSH) 0.9 %
10 SYRINGE (ML) INJECTION AS NEEDED
Status: DISCONTINUED | OUTPATIENT
Start: 2018-08-05 | End: 2018-08-06 | Stop reason: HOSPADM

## 2018-08-05 RX ADMIN — HYDROMORPHONE HYDROCHLORIDE 0.5 MG: 2 INJECTION INTRAMUSCULAR; INTRAVENOUS; SUBCUTANEOUS at 22:58

## 2018-08-05 RX ADMIN — SODIUM CHLORIDE 1000 ML: 9 INJECTION, SOLUTION INTRAVENOUS at 22:58

## 2018-08-06 ENCOUNTER — TELEPHONE (OUTPATIENT)
Dept: SURGERY | Facility: CLINIC | Age: 63
End: 2018-08-06

## 2018-08-06 VITALS
DIASTOLIC BLOOD PRESSURE: 80 MMHG | WEIGHT: 264.55 LBS | HEIGHT: 63 IN | TEMPERATURE: 98.2 F | RESPIRATION RATE: 18 BRPM | SYSTOLIC BLOOD PRESSURE: 166 MMHG | HEART RATE: 75 BPM | BODY MASS INDEX: 46.88 KG/M2 | OXYGEN SATURATION: 97 %

## 2018-08-06 PROCEDURE — 0 IOPAMIDOL PER 1 ML: Performed by: EMERGENCY MEDICINE

## 2018-08-06 RX ADMIN — IOPAMIDOL 100 ML: 755 INJECTION, SOLUTION INTRAVENOUS at 00:02

## 2018-08-06 NOTE — ED PROVIDER NOTES
Subjective   History of Present Illness  History of Present Illness    Chief complaint: Abdominal pain    Location: Left lower quadrant    Quality/Severity:  Moderate    Timing/Duration: Sudden onset very early in the morning on August 4    Modifying Factors: Movement and standing makes the pain worse    Associated Symptoms: None    Narrative: The patient is a 62-year-old white female who presents as noted above.  The patient did have abdominal wall hernia surgery on August 2 and was doing well until very early on the morning of the fourth.  The patient relates that while trying to stand up to make her way to the bathroom she had the sudden, intense, sharp pain in her left lower quadrant.  Since that time the pain has continued to get worse.  No complaints of nausea or vomiting.  No dysuria or constipation.  A review of the surgeon's note shows that mesh was placed in the lower abdomen.    Review of Systems   Constitutional: Negative for activity change, appetite change, fatigue and fever.   HENT: Negative for congestion.    Respiratory: Negative for cough, shortness of breath and wheezing.    Cardiovascular: Negative for chest pain, palpitations and leg swelling.   Gastrointestinal: Positive for abdominal pain and constipation (from hydrocodone). Negative for diarrhea, nausea and vomiting.   Endocrine: Negative for polydipsia.   Genitourinary: Negative for difficulty urinating, dysuria, flank pain, frequency and urgency.   Musculoskeletal: Negative for back pain.   Skin: Positive for wound (surgical). Negative for rash.   Neurological: Negative for dizziness, weakness and headaches.   Psychiatric/Behavioral: Negative for confusion.       Past Medical History:   Diagnosis Date   • Anemia     history of   • Anxiety    • Colon polyp    • DDD (degenerative disc disease), lumbosacral    • Depression    • Diabetes mellitus (CMS/HCC)    • Disease of thyroid gland     hypothyroid   • Hypertension    • IBS (irritable bowel  syndrome)    • Mitral valve prolapse    • Rheumatoid arthritis (CMS/HCC)    • Sleep apnea with use of continuous positive airway pressure (CPAP)    • Ventral hernia     sched repair       Allergies   Allergen Reactions   • Penicillins Hives and Swelling     Felt like throat was swelling   • Nsaids Hives   • Sulfa Antibiotics Hives       Past Surgical History:   Procedure Laterality Date   • ANTERIOR CERVICAL FUSION  2011    C4C5   • CHOLECYSTECTOMY     • COLONOSCOPY N/A 2/8/2017    Procedure: COLONOSCOPY;  Surgeon: Domi Juarez MD;  Location: [facility] OR;  Service:    • ENDOSCOPY N/A 2/8/2017    Procedure: ESOPHAGOGASTRODUODENOSCOPY;  Surgeon: Domi Juarez MD;  Location: [facility] OR;  Service:    • HYSTERECTOMY      partial 1984, 2002 complete   • LAPAROSCOPIC LYSIS OF ADHESIONS  2002    and cyst removal   • OVARIAN CYST REMOVAL      laparoscopic x2 last 2006   • VENTRAL/INCISIONAL HERNIA REPAIR N/A 8/2/2018    Procedure: VENTRAL AND SPIGELIAN HERNIA REPAIR LAPAROSCOPIC;  Surgeon: Erin Fried DO;  Location: [facility] OR;  Service: General       Family History   Problem Relation Age of Onset   • Diabetes Mother    • COPD Mother    • Heart disease Father    • Cancer Father    • Prostate cancer Father    • Aneurysm Father    • Diabetes Brother    • Pancreatitis Brother    • Malig Hyperthermia Neg Hx        Social History     Social History   • Marital status:      Social History Main Topics   • Smoking status: Former Smoker     Packs/day: 1.00     Years: 10.00     Types: Cigarettes     Quit date: 2008   • Smokeless tobacco: Never Used   • Alcohol use Yes      Comment: occasional   • Drug use: No   • Sexual activity: Defer     Other Topics Concern   • Not on file           Objective   Physical Exam   Constitutional: She is oriented to person, place, and time. She appears well-developed and well-nourished.   The patient is an obese, 62-year-old, white female in no acute distress.   HENT:   Head:  Normocephalic and atraumatic.   Mouth/Throat: Oropharynx is clear and moist.   Eyes: Pupils are equal, round, and reactive to light. Conjunctivae and EOM are normal.   Neck: Normal range of motion. Neck supple. No thyromegaly present.   Cardiovascular: Normal rate, regular rhythm and normal heart sounds.    No murmur heard.  Pulmonary/Chest: Effort normal and breath sounds normal. No respiratory distress. She has no wheezes. She has no rales.   Abdominal: Soft. Bowel sounds are normal. She exhibits no distension. There is tenderness (diffusely in the left lower quadrant.).   All surgical incisions appear to be healing well.  No Swelling, ecchymosis or skin changes noted in the left lower quadrant.   Musculoskeletal: Normal range of motion. She exhibits no edema or tenderness.   Lymphadenopathy:     She has no cervical adenopathy.   Neurological: She is alert and oriented to person, place, and time.   Skin: Skin is warm and dry. No rash noted.   Psychiatric: She has a normal mood and affect. Her behavior is normal.   Nursing note and vitals reviewed.      Procedures           ED Course  ED Course as of Aug 06 0123   Sun Aug 05, 2018   2259 ER visit of July 14 and the operative report from August 2 reviewed in detail.  The operative report did show that the patient had a left lower spigelian hernia that was repaired using mesh.  The area where the patient's complaint of pain is located coincides with the area of recent surgery.  [ML]   Mon Aug 06, 2018   0044 Radiology wet read of the CT abdomen/pelvis showed no acute findings.  Negative workup discussed with patient and .  Possible etiologies discussed as were the treatment plan, expectations and warnings.  Patient strongly advised to follow-up with her general surgeon as previously scheduled.  [ML]      ED Course User Index  [ML] Caleb Burkett MD                  MDM  Number of Diagnoses or Management Options  Abdominal wall pain in left lower  quadrant: new and requires workup     Amount and/or Complexity of Data Reviewed  Clinical lab tests: ordered and reviewed  Tests in the radiology section of CPT®: ordered and reviewed  Review and summarize past medical records: yes  Independent visualization of images, tracings, or specimens: yes    Risk of Complications, Morbidity, and/or Mortality  Presenting problems: high  Diagnostic procedures: high  Management options: moderate    Patient Progress  Patient progress: improved    My differential diagnosis for abdominal pain includes but is not limited to:  Gastritis, gastroenteritis, peptic ulcer disease, GERD, esophageal perforation, acute appendicitis, mesenteric adenitis, Meckel’s diverticulum, epiploic appendagitis, diverticulitis, colon cancer, ulcerative colitis, Crohn’s disease, intussusception, small bowel obstruction, adhesions, ischemic bowel, perforated viscus, ileus, obstipation, biliary colic, cholecystitis, cholelithiasis, Eric-Zachery Johnny, hepatitis, pancreatitis, common bile duct obstruction, cholangitis, bile leak, splenic trauma, splenic rupture, splenic infarction, splenic abscess, abdominal abscess, ascites, spontaneous bacterial peritonitis, hernia, UTI, cystitis, prostatitis, ureterolithiasis, urinary obstruction, ovarian cyst, torsion, pregnancy, ectopic pregnancy, PID, pelvic abscess, mittelschmerz, endometriosis, AAA, myocardial infarction, pneumonia, cancer, porphyria, DKA, medications, sickle cell, viral syndrome, zoster    Labs this visit  Lab Results (last 24 hours)     Procedure Component Value Units Date/Time    CBC & Differential [[id number]] Collected:  08/05/18 2254    Specimen:  Blood Updated:  08/05/18 7676    Narrative:       The following orders were created for panel order CBC & Differential.  Procedure                               Abnormality         Status                     ---------                               -----------         ------                     CBC Auto  Differential[[id number]]        Abnormal            Final result                 Please view results for these tests on the individual orders.    Comprehensive Metabolic Panel [[id number]]  (Abnormal) Collected:  08/05/18 2254    Specimen:  Blood Updated:  08/05/18 2323     Glucose 150 (H) mg/dL      BUN 12 mg/dL      Creatinine 0.94 mg/dL      Sodium 138 mmol/L      Potassium 4.0 mmol/L      Chloride 100 mmol/L      CO2 30.0 (H) mmol/L      Calcium 8.9 mg/dL      Total Protein 6.9 g/dL      Albumin 3.60 g/dL      ALT (SGPT) 29 U/L      AST (SGOT) 23 U/L      Alkaline Phosphatase 114 U/L      Total Bilirubin 0.3 mg/dL      eGFR Non African Amer 61 mL/min/1.73      Globulin 3.3 gm/dL      A/G Ratio 1.1 g/dL      BUN/Creatinine Ratio 12.8     Anion Gap 8.0 mmol/L     Urinalysis With Microscopic If Indicated (No Culture) - Urine, Catheter [[id number]]  (Normal) Collected:  08/05/18 2254    Specimen:  Urine from Urine, Catheter Updated:  08/05/18 2307     Color, UA Yellow     Appearance, UA Clear     pH, UA 6.5     Specific Gravity, UA 1.015     Glucose, UA Negative     Ketones, UA Negative     Bilirubin, UA Negative     Blood, UA Negative     Protein, UA Negative     Leuk Esterase, UA Negative     Nitrite, UA Negative     Urobilinogen, UA 0.2 E.U./dL    Narrative:       Urine microscopic not indicated.    CBC Auto Differential [[id number]]  (Abnormal) Collected:  08/05/18 2254    Specimen:  Blood Updated:  08/05/18 2303     WBC 6.72 10*3/mm3      RBC 4.29 10*6/mm3      Hemoglobin 11.9 (L) g/dL      Hematocrit 36.4 (L) %      MCV 84.8 fL      MCH 27.7 pg      MCHC 32.7 g/dL      RDW 13.7 %      RDW-SD 42.7 fl      MPV 9.8 fL      Platelets 252 10*3/mm3      Neutrophil % 64.5 %      Lymphocyte % 23.8 %      Monocyte % 8.9 (H) %      Eosinophil % 2.1 %      Basophil % 0.3 %      Immature Grans % 0.4 %      Neutrophils, Absolute 4.33 10*3/mm3      Lymphocytes, Absolute 1.60 10*3/mm3      Monocytes, Absolute 0.60 10*3/mm3       Eosinophils, Absolute 0.14 10*3/mm3      Basophils, Absolute 0.02 10*3/mm3      Immature Grans, Absolute 0.03 10*3/mm3      nRBC 0.0 /100 WBC         Prescribed on discharge             Medication List      No changes were made to your prescriptions during this visit.       All lab results, imaging results and other tests were reviewed by Caleb Burkett MD and unless otherwise specified were found to be unremarkable.      Final diagnoses:   Abdominal wall pain in left lower quadrant            Caleb Burkett MD  08/06/18 0124

## 2018-08-06 NOTE — TELEPHONE ENCOUNTER
"Pt called and reports she went to [facility] ER yesterday and was told to FU with Dr. Fried.  Pt states she got up to go the the bathroom PO night #1 without assistance and \"strained herself.\"  Pt states, \"I knew I shouldn't have done that but I did.\" She reports the pain is exactly  as it was when she went to ER yesterday.  No worse. No better.  Pt informed Dr. Fried is out of the office until 08/13/2018 and if pain cont or other s/s such as temp, chills, N/V, increase in pain, or drainage appear she should go to ER.  Pt states, \"I really do not want to see any other doctor, I will just keep using ice.\"  "

## 2018-08-13 ENCOUNTER — OFFICE VISIT (OUTPATIENT)
Dept: SURGERY | Facility: CLINIC | Age: 63
End: 2018-08-13

## 2018-08-13 DIAGNOSIS — Z87.19 STATUS POST LAPAROSCOPIC HERNIA REPAIR: Primary | ICD-10-CM

## 2018-08-13 DIAGNOSIS — Z98.890 STATUS POST LAPAROSCOPIC HERNIA REPAIR: Primary | ICD-10-CM

## 2018-08-13 PROCEDURE — 99024 POSTOP FOLLOW-UP VISIT: CPT | Performed by: SURGERY

## 2018-08-13 NOTE — PROGRESS NOTES
Diane Akin 62 y.o. female presents for PO FU VHR.  Pt reports + LLQ pain that pt reports feels like when she use to get cysts on her ovaries.  Pt also c/o constipation and states she has taken MOM.  + food/fld intake, + bladder func w/o diff.  Pt denies temp/chills.   PCP WILLIS Hylton.       HPI   Above noted and agree.  Diane has some tenderness in the LLQ.  She is tolerating a regular diet without nausea or vomiting.  She has no fevers or chills.  She has some constipation but the milk of magnesia has helped.      Review of Systems        Past Medical History:   Diagnosis Date   • Anemia     history of   • Anxiety    • Colon polyp    • DDD (degenerative disc disease), lumbosacral    • Depression    • Diabetes mellitus (CMS/HCC)    • Disease of thyroid gland     hypothyroid   • Hypertension    • IBS (irritable bowel syndrome)    • Mitral valve prolapse    • Rheumatoid arthritis (CMS/HCC)    • Sleep apnea with use of continuous positive airway pressure (CPAP)    • Ventral hernia     sched repair           Past Surgical History:   Procedure Laterality Date   • ANTERIOR CERVICAL FUSION  2011    C4C5   • CHOLECYSTECTOMY     • COLONOSCOPY N/A 2/8/2017    Procedure: COLONOSCOPY;  Surgeon: Domi Juarez MD;  Location: [facility];  Service:    • ENDOSCOPY N/A 2/8/2017    Procedure: ESOPHAGOGASTRODUODENOSCOPY;  Surgeon: Domi Juarez MD;  Location: [facility] OR;  Service:    • HYSTERECTOMY      partial 1984, 2002 complete   • LAPAROSCOPIC LYSIS OF ADHESIONS  2002    and cyst removal   • OVARIAN CYST REMOVAL      laparoscopic x2 last 2006   • VENTRAL/INCISIONAL HERNIA REPAIR N/A 8/2/2018    Procedure: VENTRAL AND SPIGELIAN HERNIA REPAIR LAPAROSCOPIC;  Surgeon: Erin Fried DO;  Location: [facility] OR;  Service: General           Physical Exam    General:  Awake and alert with no acute distress  Eyes:  No icterus  Abdomen:  Soft, non-tender  Incision:  Clean, dry, intact    There were no vitals taken for this  visit.        Diane was seen today for post-op follow-up.    Diagnoses and all orders for this visit:    Status post laparoscopic hernia repair    I reassured Diane that the surgery went well and she did not tear her repair.  We discussed methods of getting up and down.  We also discussed using ice to help with the pain.  We will follow up in one month.    Thank you for allowing me to participate in the care of this interesting patient.

## 2018-09-12 ENCOUNTER — OFFICE VISIT (OUTPATIENT)
Dept: SURGERY | Facility: CLINIC | Age: 63
End: 2018-09-12

## 2018-09-12 DIAGNOSIS — Z98.890 STATUS POST HERNIA REPAIR: Primary | ICD-10-CM

## 2018-09-12 DIAGNOSIS — Z87.19 STATUS POST HERNIA REPAIR: Primary | ICD-10-CM

## 2018-09-12 PROCEDURE — 99024 POSTOP FOLLOW-UP VISIT: CPT | Performed by: SURGERY

## 2018-09-12 NOTE — PROGRESS NOTES
Diane Gerardo 62 y.o. female presents for PO FU VHR.  Pt states she is feeling much better.  PCP WILLIS Hylton.       HPI   Above noted and agree.      Review of Systems        Past Medical History:   Diagnosis Date   • Anemia     history of   • Anxiety    • Colon polyp    • DDD (degenerative disc disease), lumbosacral    • Depression    • Diabetes mellitus (CMS/HCC)    • Disease of thyroid gland     hypothyroid   • Hypertension    • IBS (irritable bowel syndrome)    • Mitral valve prolapse    • Rheumatoid arthritis (CMS/HCC)    • Sleep apnea with use of continuous positive airway pressure (CPAP)    • Ventral hernia     sched repair           Past Surgical History:   Procedure Laterality Date   • ANTERIOR CERVICAL FUSION  2011    C4C5   • CHOLECYSTECTOMY     • COLONOSCOPY N/A 2/8/2017    Procedure: COLONOSCOPY;  Surgeon: Domi Juarez MD;  Location: [facility] OR;  Service:    • ENDOSCOPY N/A 2/8/2017    Procedure: ESOPHAGOGASTRODUODENOSCOPY;  Surgeon: Domi Juarez MD;  Location: [facility] OR;  Service:    • HYSTERECTOMY      partial 1984, 2002 complete   • LAPAROSCOPIC LYSIS OF ADHESIONS  2002    and cyst removal   • OVARIAN CYST REMOVAL      laparoscopic x2 last 2006   • VENTRAL/INCISIONAL HERNIA REPAIR N/A 8/2/2018    Procedure: VENTRAL AND SPIGELIAN HERNIA REPAIR LAPAROSCOPIC;  Surgeon: Erin Fried DO;  Location: [facility] OR;  Service: General           Physical Exam    General:  Awake and alert with no acute distress  Eyes:  No icterus  Abdomen:  Soft, non-tender  Incision:  Clean, dry, intact    There were no vitals taken for this visit.        Diane was seen today for post-op follow-up.    Diagnoses and all orders for this visit:    Status post hernia repair    Return to clinic as needed.    Thank you for allowing me to participate in the care of this interesting patient.

## 2019-03-27 ENCOUNTER — TRANSCRIBE ORDERS (OUTPATIENT)
Dept: ADMINISTRATIVE | Facility: HOSPITAL | Age: 64
End: 2019-03-27

## 2019-03-27 DIAGNOSIS — Z12.31 VISIT FOR SCREENING MAMMOGRAM: Primary | ICD-10-CM

## 2019-05-06 ENCOUNTER — HOSPITAL ENCOUNTER (OUTPATIENT)
Dept: MAMMOGRAPHY | Facility: HOSPITAL | Age: 64
Discharge: HOME | End: 2019-05-06
Admitting: FAMILY MEDICINE

## 2019-05-06 DIAGNOSIS — Z12.31 VISIT FOR SCREENING MAMMOGRAM: ICD-10-CM

## 2019-05-06 PROCEDURE — 77063 BREAST TOMOSYNTHESIS BI: CPT

## 2019-05-06 PROCEDURE — 77067 SCR MAMMO BI INCL CAD: CPT

## 2019-05-15 ENCOUNTER — OFFICE VISIT (OUTPATIENT)
Dept: SURGERY | Facility: CLINIC | Age: 64
End: 2019-05-15

## 2019-05-15 VITALS
RESPIRATION RATE: 16 BRPM | BODY MASS INDEX: 48.2 KG/M2 | WEIGHT: 272 LBS | DIASTOLIC BLOOD PRESSURE: 90 MMHG | HEART RATE: 72 BPM | SYSTOLIC BLOOD PRESSURE: 142 MMHG | HEIGHT: 63 IN

## 2019-05-15 DIAGNOSIS — R19.00 ABDOMINAL WALL BULGE: Primary | ICD-10-CM

## 2019-05-15 DIAGNOSIS — E66.01 MORBID OBESITY (HCC): ICD-10-CM

## 2019-05-15 PROCEDURE — 99213 OFFICE O/P EST LOW 20 MIN: CPT | Performed by: SURGERY

## 2019-05-15 NOTE — PROGRESS NOTES
"Diane Gerardo 63 y.o. female presents as a self ref for eval poss abd hernia.  Pt states she noticed a hard area on her abd approx 1 mo ago and states, \"It just feels uncomfortable.\" PCP Dr. Davis.   Chief Complaint   Patient presents with   • Hernia             HPI   One month ago Diane noticed a lump in her epigastric area.  It is more obvious when she stands.  She tolerates a regular diet.  She moves her bowels once a week.  She had a negative colonoscopy by Dr. Juarez in 2017.  She has IBS.  She denies chest pain and shortness of breath.  She has no other complaints.      Review of Systems          Current Outpatient Medications:   •  cholecalciferol (VITAMIN D3) 1000 UNITS tablet, Take 2,000 Units by mouth daily., Disp: , Rfl:   •  hydrochlorothiazide (HYDRODIURIL) 25 MG tablet, Take 25 mg by mouth daily., Disp: , Rfl:   •  levothyroxine (SYNTHROID, LEVOTHROID) 150 MCG tablet, Take 150 mcg by mouth daily., Disp: , Rfl:   •  metFORMIN ER (GLUCOPHAGE-XR) 750 MG 24 hr tablet, Take 750 mg by mouth Daily With Dinner., Disp: , Rfl:   •  Tofacitinib Citrate (XELJANZ) 5 MG tablet, Take 1 tablet by mouth 2 (Two) Times a Day., Disp: , Rfl:         Allergies   Allergen Reactions   • Penicillins Hives and Swelling     Felt like throat was swelling   • Nsaids Hives   • Sulfa Antibiotics Hives           Past Medical History:   Diagnosis Date   • Anemia     history of   • Anxiety    • Colon polyp    • DDD (degenerative disc disease), lumbosacral    • Depression    • Diabetes mellitus (CMS/HCC)    • Disease of thyroid gland     hypothyroid   • Hypertension    • IBS (irritable bowel syndrome)    • Mitral valve prolapse    • Rheumatoid arthritis (CMS/HCC)    • Sleep apnea with use of continuous positive airway pressure (CPAP)    • Ventral hernia     sched repair           Past Surgical History:   Procedure Laterality Date   • ANTERIOR CERVICAL FUSION  2011    C4C5   • CHOLECYSTECTOMY     • COLONOSCOPY N/A 2/8/2017    Procedure: " "COLONOSCOPY;  Surgeon: Domi Juarez MD;  Location: [facility] OR;  Service:    • ENDOSCOPY N/A 2017    Procedure: ESOPHAGOGASTRODUODENOSCOPY;  Surgeon: Domi Juarez MD;  Location: [facility] OR;  Service:    • HYSTERECTOMY      partial ,  complete   • LAPAROSCOPIC LYSIS OF ADHESIONS      and cyst removal   • OVARIAN CYST REMOVAL      laparoscopic x2 last    • VENTRAL/INCISIONAL HERNIA REPAIR N/A 2018    Procedure: VENTRAL AND SPIGELIAN HERNIA REPAIR LAPAROSCOPIC;  Surgeon: Erin Fried DO;  Location: [facility] OR;  Service: General           Social History     Tobacco Use   • Smoking status: Former Smoker     Packs/day: 1.00     Years: 10.00     Pack years: 10.00     Types: Cigarettes     Last attempt to quit:      Years since quittin.3   • Smokeless tobacco: Never Used   Substance Use Topics   • Alcohol use: Yes     Comment: occasional   • Drug use: No             There is no immunization history on file for this patient.        Physical Exam   Constitutional: She is oriented to person, place, and time. She appears well-developed and well-nourished.   Cardiovascular: Normal rate and regular rhythm.   Pulmonary/Chest: Effort normal and breath sounds normal.   Abdominal: Soft. Bowel sounds are normal.   On valsalva there is a bulge in the midline above the umbilicus.  This is below a Gardner incision.   Musculoskeletal: She exhibits no edema or deformity.   Neurological: She is alert and oriented to person, place, and time.   Skin: Skin is warm and dry.   Psychiatric: She has a normal mood and affect. Her behavior is normal.   Nursing note and vitals reviewed.      Debilities/Disabilities Identified: None    Emotional Behavior: Appropriate      /90   Pulse 72   Resp 16   Ht 160 cm (63\")   Wt 123 kg (272 lb)   BMI 48.18 kg/m²         Diane was seen today for hernia.    Diagnoses and all orders for this visit:    Abdominal wall bulge    Morbid obesity (CMS/HCC)    She most " likely has an incisional hernia.  I will check a CT scan to evaluate it for surgical planning.    Thank you for allowing me to participate in the care of this interesting patient.

## 2019-05-16 DIAGNOSIS — R10.84 GENERALIZED ABDOMINAL PAIN: Primary | ICD-10-CM

## 2019-05-17 ENCOUNTER — HOSPITAL ENCOUNTER (OUTPATIENT)
Dept: CT IMAGING | Facility: HOSPITAL | Age: 64
Discharge: HOME | End: 2019-05-17
Admitting: SURGERY

## 2019-05-17 DIAGNOSIS — R10.84 GENERALIZED ABDOMINAL PAIN: ICD-10-CM

## 2019-05-17 LAB — CREAT BLDA-MCNC: 0.9 MG/DL (ref 0.6–1.3)

## 2019-05-17 PROCEDURE — 82565 ASSAY OF CREATININE: CPT

## 2019-05-17 PROCEDURE — 0 DIATRIZOATE MEGLUMINE & SODIUM PER 1 ML: Performed by: SURGERY

## 2019-05-17 PROCEDURE — 74177 CT ABD & PELVIS W/CONTRAST: CPT

## 2019-05-17 PROCEDURE — 0 IOPAMIDOL PER 1 ML: Performed by: SURGERY

## 2019-05-17 RX ORDER — SODIUM CHLORIDE 9 MG/ML
INJECTION, SOLUTION INTRAVENOUS
Status: DISPENSED
Start: 2019-05-17 | End: 2019-05-17

## 2019-05-17 RX ADMIN — DIATRIZOATE MEGLUMINE AND DIATRIZOATE SODIUM 30 ML: 600; 100 SOLUTION ORAL; RECTAL at 11:00

## 2019-05-17 RX ADMIN — IOPAMIDOL 100 ML: 755 INJECTION, SOLUTION INTRAVENOUS at 12:30

## 2019-05-22 ENCOUNTER — OFFICE VISIT (OUTPATIENT)
Dept: SURGERY | Facility: CLINIC | Age: 64
End: 2019-05-22

## 2019-05-22 VITALS
HEIGHT: 63 IN | BODY MASS INDEX: 48.2 KG/M2 | WEIGHT: 272 LBS | SYSTOLIC BLOOD PRESSURE: 128 MMHG | DIASTOLIC BLOOD PRESSURE: 84 MMHG | RESPIRATION RATE: 16 BRPM | HEART RATE: 72 BPM

## 2019-05-22 DIAGNOSIS — K43.2 INCISIONAL HERNIA, WITHOUT OBSTRUCTION OR GANGRENE: Primary | ICD-10-CM

## 2019-05-22 PROCEDURE — 99214 OFFICE O/P EST MOD 30 MIN: CPT | Performed by: SURGERY

## 2019-05-22 NOTE — PROGRESS NOTES
Diane Gerardo 63 y.o. female presents for  FU/DISCUSS CT ABD/PEL.        HPI   Above noted and agree.  Diane has a 2.5 cm incisional hernia from her previous laparoscopic hernia repair.  She is tolerating a regular diet without nausea or vomiting.  She is moving her bowels well without bleeding.   She has no chest pain or shortness of breath.  She has no fevers or chills.  She has some soreness at the hernia site but no severe pain.  She has no other complaints.      Review of Systems   All other systems reviewed and are negative.          Past Medical History:   Diagnosis Date   • Anemia     history of   • Anxiety    • Colon polyp    • DDD (degenerative disc disease), lumbosacral    • Depression    • Diabetes mellitus (CMS/HCC)    • Disease of thyroid gland     hypothyroid   • Hypertension    • IBS (irritable bowel syndrome)    • Mitral valve prolapse    • Rheumatoid arthritis (CMS/HCC)    • Sleep apnea with use of continuous positive airway pressure (CPAP)    • Ventral hernia     sched repair           Past Surgical History:   Procedure Laterality Date   • ANTERIOR CERVICAL FUSION  2011    C4C5   • CHOLECYSTECTOMY     • COLONOSCOPY N/A 2/8/2017    Procedure: COLONOSCOPY;  Surgeon: Domi Juarez MD;  Location: [facility] OR;  Service:    • ENDOSCOPY N/A 2/8/2017    Procedure: ESOPHAGOGASTRODUODENOSCOPY;  Surgeon: Domi Juarez MD;  Location: [facility] OR;  Service:    • HYSTERECTOMY      partial 1984, 2002 complete   • LAPAROSCOPIC LYSIS OF ADHESIONS  2002    and cyst removal   • OVARIAN CYST REMOVAL      laparoscopic x2 last 2006   • VENTRAL/INCISIONAL HERNIA REPAIR N/A 8/2/2018    Procedure: VENTRAL AND SPIGELIAN HERNIA REPAIR LAPAROSCOPIC;  Surgeon: Erin Fried DO;  Location: [facility] OR;  Service: General           Physical Exam   Constitutional: She is oriented to person, place, and time. She appears well-developed and well-nourished.   HENT:   Head: Normocephalic and atraumatic.   Right Ear: External ear  "normal.   Left Ear: External ear normal.   Cardiovascular: Normal rate, regular rhythm and normal heart sounds.   Pulmonary/Chest: Effort normal and breath sounds normal.   Abdominal: Soft. Bowel sounds are normal.   Incisional hernia noted on valsalva at a Gardner trocar site   Musculoskeletal: She exhibits no edema or deformity.   Neurological: She is alert and oriented to person, place, and time.   Skin: Skin is warm and dry.   Psychiatric: She has a normal mood and affect. Her behavior is normal.   Nursing note and vitals reviewed.    No debilities noted      /84   Pulse 72   Resp 16   Ht 160 cm (63\")   Wt 123 kg (272 lb)   BMI 48.18 kg/m²         Diane was seen today for hernia.    Diagnoses and all orders for this visit:    Incisional hernia, without obstruction or gangrene      The risks and benefits of repairing this hernia were discussed with Diane.  Benefits and risks, not limited to but including:  Bleeding, infection, recurrence, formation of a hematoma or seroma, injury to intra-abdominal structures, DVT, PE, atelectasis, pneumonia, anesthesia complications.  She appeared to understand and is willing to proceed.    Thank you for allowing me to participate in the care of this interesting patient.    "

## 2019-05-22 NOTE — H&P
Diane Gerardo 63 y.o. female presents for  FU/DISCUSS CT ABD/PEL.        HPI   Above noted and agree.  Diane has a 2.5 cm incisional hernia from her previous laparoscopic hernia repair.  She is tolerating a regular diet without nausea or vomiting.  She is moving her bowels well without bleeding.   She has no chest pain or shortness of breath.  She has no fevers or chills.  She has some soreness at the hernia site but no severe pain.  She has no other complaints.      Review of Systems   All other systems reviewed and are negative.          Past Medical History:   Diagnosis Date   • Anemia     history of   • Anxiety    • Colon polyp    • DDD (degenerative disc disease), lumbosacral    • Depression    • Diabetes mellitus (CMS/HCC)    • Disease of thyroid gland     hypothyroid   • Hypertension    • IBS (irritable bowel syndrome)    • Mitral valve prolapse    • Rheumatoid arthritis (CMS/HCC)    • Sleep apnea with use of continuous positive airway pressure (CPAP)    • Ventral hernia     sched repair           Past Surgical History:   Procedure Laterality Date   • ANTERIOR CERVICAL FUSION  2011    C4C5   • CHOLECYSTECTOMY     • COLONOSCOPY N/A 2/8/2017    Procedure: COLONOSCOPY;  Surgeon: Domi Juarez MD;  Location: [facility] OR;  Service:    • ENDOSCOPY N/A 2/8/2017    Procedure: ESOPHAGOGASTRODUODENOSCOPY;  Surgeon: Domi Juarez MD;  Location: [facility] OR;  Service:    • HYSTERECTOMY      partial 1984, 2002 complete   • LAPAROSCOPIC LYSIS OF ADHESIONS  2002    and cyst removal   • OVARIAN CYST REMOVAL      laparoscopic x2 last 2006   • VENTRAL/INCISIONAL HERNIA REPAIR N/A 8/2/2018    Procedure: VENTRAL AND SPIGELIAN HERNIA REPAIR LAPAROSCOPIC;  Surgeon: Erin Fried DO;  Location: [facility] OR;  Service: General           Physical Exam   Constitutional: She is oriented to person, place, and time. She appears well-developed and well-nourished.   HENT:   Head: Normocephalic and atraumatic.   Right Ear: External ear  "normal.   Left Ear: External ear normal.   Cardiovascular: Normal rate, regular rhythm and normal heart sounds.   Pulmonary/Chest: Effort normal and breath sounds normal.   Abdominal: Soft. Bowel sounds are normal.   Incisional hernia noted on valsalva at a Gardner trocar site   Musculoskeletal: She exhibits no edema or deformity.   Neurological: She is alert and oriented to person, place, and time.   Skin: Skin is warm and dry.   Psychiatric: She has a normal mood and affect. Her behavior is normal.   Nursing note and vitals reviewed.    No debilities noted      /84   Pulse 72   Resp 16   Ht 160 cm (63\")   Wt 123 kg (272 lb)   BMI 48.18 kg/m²          Diane was seen today for hernia.    Diagnoses and all orders for this visit:    Incisional hernia, without obstruction or gangrene      The risks and benefits of repairing this hernia were discussed with Diane.  Benefits and risks, not limited to but including:  Bleeding, infection, recurrence, formation of a hematoma or seroma, injury to intra-abdominal structures, DVT, PE, atelectasis, pneumonia, anesthesia complications.  She appeared to understand and is willing to proceed.    Thank you for allowing me to participate in the care of this interesting patient.      "

## 2019-06-03 ENCOUNTER — APPOINTMENT (OUTPATIENT)
Dept: PREADMISSION TESTING | Facility: HOSPITAL | Age: 64
End: 2019-06-03

## 2019-06-03 VITALS
SYSTOLIC BLOOD PRESSURE: 176 MMHG | HEART RATE: 65 BPM | HEIGHT: 64 IN | OXYGEN SATURATION: 99 % | BODY MASS INDEX: 46.06 KG/M2 | DIASTOLIC BLOOD PRESSURE: 65 MMHG | WEIGHT: 269.8 LBS | RESPIRATION RATE: 16 BRPM

## 2019-06-03 LAB
ANION GAP SERPL CALCULATED.3IONS-SCNC: 11.6 MMOL/L
BUN BLD-MCNC: 21 MG/DL (ref 8–23)
BUN/CREAT SERPL: 20.2 (ref 7–25)
CALCIUM SPEC-SCNC: 8.8 MG/DL (ref 8.6–10.5)
CHLORIDE SERPL-SCNC: 102 MMOL/L (ref 98–107)
CO2 SERPL-SCNC: 27.4 MMOL/L (ref 22–29)
CREAT BLD-MCNC: 1.04 MG/DL (ref 0.57–1)
DEPRECATED RDW RBC AUTO: 42.8 FL (ref 37–54)
ERYTHROCYTE [DISTWIDTH] IN BLOOD BY AUTOMATED COUNT: 13.8 % (ref 12.3–15.4)
GFR SERPL CREATININE-BSD FRML MDRD: 54 ML/MIN/1.73
GLUCOSE BLD-MCNC: 194 MG/DL (ref 65–99)
HCT VFR BLD AUTO: 37.2 % (ref 34–46.6)
HGB BLD-MCNC: 12.2 G/DL (ref 12–15.9)
MCH RBC QN AUTO: 27.4 PG (ref 26.6–33)
MCHC RBC AUTO-ENTMCNC: 32.8 G/DL (ref 31.5–35.7)
MCV RBC AUTO: 83.4 FL (ref 79–97)
PLATELET # BLD AUTO: 258 10*3/MM3 (ref 140–450)
PMV BLD AUTO: 9.3 FL (ref 6–12)
POTASSIUM BLD-SCNC: 3.8 MMOL/L (ref 3.5–5.2)
RBC # BLD AUTO: 4.46 10*6/MM3 (ref 3.77–5.28)
SODIUM BLD-SCNC: 141 MMOL/L (ref 136–145)
WBC NRBC COR # BLD: 5.03 10*3/MM3 (ref 3.4–10.8)

## 2019-06-03 PROCEDURE — 36415 COLL VENOUS BLD VENIPUNCTURE: CPT

## 2019-06-03 PROCEDURE — 85027 COMPLETE CBC AUTOMATED: CPT | Performed by: SURGERY

## 2019-06-03 PROCEDURE — 93005 ELECTROCARDIOGRAM TRACING: CPT

## 2019-06-03 PROCEDURE — 80048 BASIC METABOLIC PNL TOTAL CA: CPT | Performed by: SURGERY

## 2019-06-03 PROCEDURE — 93010 ELECTROCARDIOGRAM REPORT: CPT | Performed by: INTERNAL MEDICINE

## 2019-06-03 NOTE — DISCHARGE INSTRUCTIONS
CONTINUE TO HOLD VITAMIN D    NOTHING TO EAT OR DRINK AFTER MIDNIGHT    PRE-ADMISSION TESTING INSTRUCTIONS FOR ADULTS    Take these medications the morning of surgery with a small sip of water: LEVOTHYROXINE    BRING CPAP      No aspirin, advil, aleve, ibuprofen, naproxen, diet pills, decongestants, or herbal/vitamins for a week prior to surgery.    General Instructions:    •     • Patients who avoid smoking, chewing tobacco and alcohol for 4 weeks prior to surgery have a reduced risk of post-operative complications.  If at all possible, quit smoking as many days before surgery as you can.    • Do not smoke, use chewing tobacco or drink alcohol the day of surgery    • Bring your C-PAP/ BI-PAP machine if you use one.  • Wear clean comfortable clothes and socks.  • Do not wear contact lenses, lotion, deodorant, or make-up.  Bring a case for your glasses if applicable. You may brush your teeth the morning of surgery.  • You may wear dentures/partials, do not put adhesive/glue on them.  • Bring crutches or walker if applicable.  • Leave all other jewelry and valuables at home.      Preventing a Surgical Site Infection:    • Shower the night before and on the morning of surgery using the chlorhexidine soap you were given.  Use a clean washcloth with the soap.  Place clean sheets on your bed after showering the night before surgery. Do not use the CHG soap on your hair, face, or private areas. Wash your body gently for five (5) minutes. Do not scrub your skin.  Dry with a clean towel and dress in clean clothing.    • Do not shave the surgical area for 10 days-2 weeks prior to surgery  because the razor can irritate skin and make it easier to develop an infection.  • Make sure you, your family, and all healthcare providers clean their hands with soap and water or an alcohol based hand  before caring for you or your wound.      Day of surgery:    Your surgeon’s office will advise you of your arrival time for the  day of surgery.    Upon arrival, a Pre-op nurse and Anesthesia provider will review your health history, obtain vital signs, and answer questions you may have.  The only belongings needed at this time will be your home medications and if applicable your C-PAP/BI-PAP machine.  If you are staying overnight your family can leave the rest of your belongings in the car and bring them to your room later.  A Pre-op nurse will start an IV and you may receive medication in preparation for surgery, including something to help you relax.  Your family will be able to see you in the Pre-op area.  While you are in surgery your family should notify the waiting room  if they leave the waiting room area and provide a contact phone number.    IF you have any questions, you can call the Pre-Admission Department at [phone] or your surgeon's office.  Notify your surgeon if  you become sick, have a fever, productive cough, or cannot be here the day of surgery    Please be aware that surgery does come with discomfort.  We want to make every effort to control your discomfort so please discuss any uncontrolled symptoms with your nurse.   Your doctor will most likely have prescribed pain medications.      If you are going home after surgery, you will receive individualized written care instructions before being discharged.  A responsible adult (over the age of 18) must drive you to and from the hospital on the day of your surgery and stay with you for 24 hours after anesthesia.    If you are staying overnight following surgery, you will be transported to your hospital room following the recovery period.  [facility] has all private rooms.    You may receive a survey regarding the care you received. Your feedback is very important and will be used to collect the necessary data to help us to continue to provide excellent care.     Deductibles and co-payments are collected on the day of service. Please be prepared  to pay the required co-pay, deductible or deposit on the day of service as defined by your plan.

## 2019-06-05 ENCOUNTER — ANESTHESIA EVENT (OUTPATIENT)
Dept: PERIOP | Facility: HOSPITAL | Age: 64
End: 2019-06-05

## 2019-06-06 ENCOUNTER — ANESTHESIA (OUTPATIENT)
Dept: PERIOP | Facility: HOSPITAL | Age: 64
End: 2019-06-06

## 2019-06-06 ENCOUNTER — HOSPITAL ENCOUNTER (OUTPATIENT)
Facility: HOSPITAL | Age: 64
Setting detail: HOSPITAL OUTPATIENT SURGERY
Discharge: HOME | End: 2019-06-06
Attending: SURGERY | Admitting: SURGERY

## 2019-06-06 VITALS
OXYGEN SATURATION: 97 % | DIASTOLIC BLOOD PRESSURE: 70 MMHG | RESPIRATION RATE: 14 BRPM | HEART RATE: 72 BPM | BODY MASS INDEX: 45.83 KG/M2 | TEMPERATURE: 97.6 F | WEIGHT: 267 LBS | SYSTOLIC BLOOD PRESSURE: 152 MMHG

## 2019-06-06 DIAGNOSIS — K43.2 INCISIONAL HERNIA, WITHOUT OBSTRUCTION OR GANGRENE: ICD-10-CM

## 2019-06-06 LAB
GLUCOSE BLDC GLUCOMTR-MCNC: 159 MG/DL (ref 70–130)
POTASSIUM BLD-SCNC: 3.6 MMOL/L (ref 3.5–5.2)

## 2019-06-06 PROCEDURE — 82962 GLUCOSE BLOOD TEST: CPT

## 2019-06-06 PROCEDURE — 25010000002 FENTANYL CITRATE (PF) 100 MCG/2ML SOLUTION: Performed by: NURSE ANESTHETIST, CERTIFIED REGISTERED

## 2019-06-06 PROCEDURE — 49560 PR REPAIR INCISIONAL HERNIA,REDUCIBLE: CPT | Performed by: SURGERY

## 2019-06-06 PROCEDURE — 49568 PR IMPLANT MESH HERNIA REPAIR/DEBRIDEMENT CLOSURE: CPT | Performed by: SURGERY

## 2019-06-06 PROCEDURE — C1781 MESH (IMPLANTABLE): HCPCS | Performed by: SURGERY

## 2019-06-06 PROCEDURE — 84132 ASSAY OF SERUM POTASSIUM: CPT | Performed by: NURSE ANESTHETIST, CERTIFIED REGISTERED

## 2019-06-06 PROCEDURE — 25010000002 HYDROMORPHONE 1 MG/ML SOLUTION: Performed by: NURSE ANESTHETIST, CERTIFIED REGISTERED

## 2019-06-06 PROCEDURE — 25010000002 SUCCINYLCHOLINE PER 20 MG: Performed by: NURSE ANESTHETIST, CERTIFIED REGISTERED

## 2019-06-06 PROCEDURE — 88302 TISSUE EXAM BY PATHOLOGIST: CPT | Performed by: SURGERY

## 2019-06-06 PROCEDURE — 25010000003 BUPIVACAINE LIPOSOME 1.3 % SUSPENSION 20 ML VIAL: Performed by: SURGERY

## 2019-06-06 PROCEDURE — 25010000002 MIDAZOLAM PER 1 MG: Performed by: NURSE ANESTHETIST, CERTIFIED REGISTERED

## 2019-06-06 PROCEDURE — C9290 INJ, BUPIVACAINE LIPOSOME: HCPCS | Performed by: SURGERY

## 2019-06-06 PROCEDURE — 25010000002 ONDANSETRON PER 1 MG: Performed by: NURSE ANESTHETIST, CERTIFIED REGISTERED

## 2019-06-06 PROCEDURE — 25010000002 PROPOFOL 10 MG/ML EMULSION: Performed by: NURSE ANESTHETIST, CERTIFIED REGISTERED

## 2019-06-06 DEVICE — VENTRALEX ST HERNIA PATCH
Type: IMPLANTABLE DEVICE | Site: ABDOMEN | Status: FUNCTIONAL
Brand: VENTRALEX ST HERNIA PATCH

## 2019-06-06 RX ORDER — LIDOCAINE HYDROCHLORIDE 20 MG/ML
INJECTION, SOLUTION INFILTRATION; PERINEURAL AS NEEDED
Status: DISCONTINUED | OUTPATIENT
Start: 2019-06-06 | End: 2019-06-06 | Stop reason: SURG

## 2019-06-06 RX ORDER — SODIUM CHLORIDE 9 MG/ML
40 INJECTION, SOLUTION INTRAVENOUS AS NEEDED
Status: DISCONTINUED | OUTPATIENT
Start: 2019-06-06 | End: 2019-06-06 | Stop reason: HOSPADM

## 2019-06-06 RX ORDER — HYDROCODONE BITARTRATE AND ACETAMINOPHEN 5; 325 MG/1; MG/1
1 TABLET ORAL EVERY 4 HOURS PRN
Qty: 30 TABLET | Refills: 0 | Status: SHIPPED | OUTPATIENT
Start: 2019-06-06 | End: 2019-11-12

## 2019-06-06 RX ORDER — CLINDAMYCIN PHOSPHATE 900 MG/50ML
900 INJECTION INTRAVENOUS ONCE
Status: COMPLETED | OUTPATIENT
Start: 2019-06-06 | End: 2019-06-06

## 2019-06-06 RX ORDER — FAMOTIDINE 20 MG/1
20 TABLET, FILM COATED ORAL
Status: COMPLETED | OUTPATIENT
Start: 2019-06-06 | End: 2019-06-06

## 2019-06-06 RX ORDER — ONDANSETRON 2 MG/ML
4 INJECTION INTRAMUSCULAR; INTRAVENOUS ONCE AS NEEDED
Status: COMPLETED | OUTPATIENT
Start: 2019-06-06 | End: 2019-06-06

## 2019-06-06 RX ORDER — MIDAZOLAM HYDROCHLORIDE 1 MG/ML
1 INJECTION INTRAMUSCULAR; INTRAVENOUS
Status: DISCONTINUED | OUTPATIENT
Start: 2019-06-06 | End: 2019-06-06 | Stop reason: HOSPADM

## 2019-06-06 RX ORDER — FENTANYL CITRATE 50 UG/ML
INJECTION, SOLUTION INTRAMUSCULAR; INTRAVENOUS AS NEEDED
Status: DISCONTINUED | OUTPATIENT
Start: 2019-06-06 | End: 2019-06-06 | Stop reason: SURG

## 2019-06-06 RX ORDER — ROCURONIUM BROMIDE 10 MG/ML
INJECTION, SOLUTION INTRAVENOUS AS NEEDED
Status: DISCONTINUED | OUTPATIENT
Start: 2019-06-06 | End: 2019-06-06 | Stop reason: SURG

## 2019-06-06 RX ORDER — SUCCINYLCHOLINE CHLORIDE 20 MG/ML
INJECTION INTRAMUSCULAR; INTRAVENOUS AS NEEDED
Status: DISCONTINUED | OUTPATIENT
Start: 2019-06-06 | End: 2019-06-06 | Stop reason: SURG

## 2019-06-06 RX ORDER — PROPOFOL 10 MG/ML
VIAL (ML) INTRAVENOUS AS NEEDED
Status: DISCONTINUED | OUTPATIENT
Start: 2019-06-06 | End: 2019-06-06 | Stop reason: SURG

## 2019-06-06 RX ORDER — MIDAZOLAM HYDROCHLORIDE 1 MG/ML
2 INJECTION INTRAMUSCULAR; INTRAVENOUS
Status: DISCONTINUED | OUTPATIENT
Start: 2019-06-06 | End: 2019-06-06 | Stop reason: HOSPADM

## 2019-06-06 RX ORDER — ONDANSETRON 2 MG/ML
4 INJECTION INTRAMUSCULAR; INTRAVENOUS ONCE AS NEEDED
Status: DISCONTINUED | OUTPATIENT
Start: 2019-06-06 | End: 2019-06-06 | Stop reason: HOSPADM

## 2019-06-06 RX ORDER — LIDOCAINE HYDROCHLORIDE 10 MG/ML
0.5 INJECTION, SOLUTION EPIDURAL; INFILTRATION; INTRACAUDAL; PERINEURAL ONCE AS NEEDED
Status: COMPLETED | OUTPATIENT
Start: 2019-06-06 | End: 2019-06-06

## 2019-06-06 RX ORDER — SODIUM CHLORIDE 0.9 % (FLUSH) 0.9 %
1-10 SYRINGE (ML) INJECTION AS NEEDED
Status: DISCONTINUED | OUTPATIENT
Start: 2019-06-06 | End: 2019-06-06 | Stop reason: HOSPADM

## 2019-06-06 RX ORDER — MAGNESIUM HYDROXIDE 1200 MG/15ML
LIQUID ORAL AS NEEDED
Status: DISCONTINUED | OUTPATIENT
Start: 2019-06-06 | End: 2019-06-06 | Stop reason: HOSPADM

## 2019-06-06 RX ORDER — GLYCOPYRROLATE 0.2 MG/ML
0.2 INJECTION INTRAMUSCULAR; INTRAVENOUS
Status: COMPLETED | OUTPATIENT
Start: 2019-06-06 | End: 2019-06-06

## 2019-06-06 RX ORDER — OXYCODONE HYDROCHLORIDE AND ACETAMINOPHEN 5; 325 MG/1; MG/1
1 TABLET ORAL ONCE AS NEEDED
Status: DISCONTINUED | OUTPATIENT
Start: 2019-06-06 | End: 2019-06-06 | Stop reason: HOSPADM

## 2019-06-06 RX ORDER — SODIUM CHLORIDE, SODIUM LACTATE, POTASSIUM CHLORIDE, CALCIUM CHLORIDE 600; 310; 30; 20 MG/100ML; MG/100ML; MG/100ML; MG/100ML
9 INJECTION, SOLUTION INTRAVENOUS CONTINUOUS
Status: DISCONTINUED | OUTPATIENT
Start: 2019-06-06 | End: 2019-06-06 | Stop reason: HOSPADM

## 2019-06-06 RX ADMIN — FENTANYL CITRATE 50 MCG: 50 INJECTION, SOLUTION INTRAMUSCULAR; INTRAVENOUS at 10:38

## 2019-06-06 RX ADMIN — CLINDAMYCIN PHOSPHATE 900 MG: 900 INJECTION, SOLUTION INTRAVENOUS at 10:32

## 2019-06-06 RX ADMIN — MIDAZOLAM HYDROCHLORIDE 1 MG: 1 INJECTION, SOLUTION INTRAMUSCULAR; INTRAVENOUS at 09:50

## 2019-06-06 RX ADMIN — HYDROMORPHONE HYDROCHLORIDE 0.5 MG: 1 INJECTION, SOLUTION INTRAMUSCULAR; INTRAVENOUS; SUBCUTANEOUS at 11:31

## 2019-06-06 RX ADMIN — PROPOFOL 200 MG: 10 INJECTION, EMULSION INTRAVENOUS at 10:28

## 2019-06-06 RX ADMIN — FAMOTIDINE 20 MG: 10 INJECTION, SOLUTION INTRAVENOUS at 09:23

## 2019-06-06 RX ADMIN — LIDOCAINE HYDROCHLORIDE 0.5 ML: 10 INJECTION, SOLUTION EPIDURAL; INFILTRATION; INTRACAUDAL; PERINEURAL at 08:19

## 2019-06-06 RX ADMIN — ROCURONIUM BROMIDE 5 MG: 10 INJECTION INTRAVENOUS at 10:28

## 2019-06-06 RX ADMIN — GLYCOPYRROLATE 0.2 MG: 0.2 INJECTION INTRAMUSCULAR; INTRAVENOUS at 09:22

## 2019-06-06 RX ADMIN — SODIUM CHLORIDE, POTASSIUM CHLORIDE, SODIUM LACTATE AND CALCIUM CHLORIDE 9 ML/HR: 600; 310; 30; 20 INJECTION, SOLUTION INTRAVENOUS at 08:19

## 2019-06-06 RX ADMIN — MIDAZOLAM HYDROCHLORIDE 1 MG: 1 INJECTION, SOLUTION INTRAMUSCULAR; INTRAVENOUS at 09:28

## 2019-06-06 RX ADMIN — FENTANYL CITRATE 50 MCG: 50 INJECTION, SOLUTION INTRAMUSCULAR; INTRAVENOUS at 10:28

## 2019-06-06 RX ADMIN — OXYCODONE HYDROCHLORIDE AND ACETAMINOPHEN 1 TABLET: 5; 325 TABLET ORAL at 12:48

## 2019-06-06 RX ADMIN — ONDANSETRON 4 MG: 2 INJECTION, SOLUTION INTRAMUSCULAR; INTRAVENOUS at 09:23

## 2019-06-06 RX ADMIN — LIDOCAINE HYDROCHLORIDE 120 MG: 20 INJECTION, SOLUTION INFILTRATION; PERINEURAL at 10:26

## 2019-06-06 RX ADMIN — HYDROMORPHONE HYDROCHLORIDE 0.25 MG: 1 INJECTION, SOLUTION INTRAMUSCULAR; INTRAVENOUS; SUBCUTANEOUS at 11:21

## 2019-06-06 RX ADMIN — SUCCINYLCHOLINE CHLORIDE 140 MG: 20 INJECTION, SOLUTION INTRAMUSCULAR; INTRAVENOUS at 10:33

## 2019-06-06 NOTE — INTERVAL H&P NOTE
H&P reviewed. The patient was examined and there are no changes to the H&P.       Pulse 69   Temp 98.1 °F (36.7 °C) (Oral)   Resp 18   Wt 121 kg (267 lb)   SpO2 96%   BMI 45.83 kg/m²

## 2019-06-06 NOTE — ANESTHESIA PROCEDURE NOTES
Airway  Urgency: elective    Date/Time: 6/6/2019 10:31 AM    General Information and Staff    Patient location during procedure: OR    Indications and Patient Condition  Indications for airway management: airway protection    Preoxygenated: yes  MILS maintained throughout  Mask difficulty assessment: 1 - vent by mask    Final Airway Details  Final airway type: endotracheal airway      Successful airway: ETT    Successful intubation technique: direct laryngoscopy  Facilitating devices/methods: intubating stylet  Endotracheal tube insertion site: oral  Blade: Davis  Blade size: 3  ETT size (mm): 7.0  Cormack-Lehane Classification: grade I - full view of glottis  Placement verified by: chest auscultation   Number of attempts at approach: 1

## 2019-06-06 NOTE — OP NOTE
GENERAL SURGERY :  Fariha  Dianemelany Gerardo  1955    Procedure Date: 06/06/19    Pre-op Diagnosis: incisional hernia    Post-op Diagnosis: incisional hernia     Procedure: incisional hernia repair    Surgeon: Fariha    Assistant: Dr. Gamino    Estimated Blood Loss:  10 ml    Complications:  none    Specimen:  Hernia sac    Findings: 3 cm incisional hernia    Clinical Note:   Diane presented with a hernia.  The risks and benefits of repairing this hernia were discussed with her.  Benefits and risks, not limited to but including:  Bleeding, infection, recurrence, formation of a hematoma or seroma, injury to intra-abdominal structures, DVT, PE, atelectasis, pneumonia, anesthesia complications.  Diane appeared to understand and signed informed consent.    Procedure: Diane was taken to the operative suite at [facility].  She was placed in supine position.  She was placed under general anesthetic.  She was prepped and draped in usual sterile fashion.  After appropriate timeout was performed local was injected.  An incision was then made directly over the previous Gardner incision.  Dissection was then carried down using blunt dissection and cautery until we reached the hernia sac.  The hernia sac was then opened and the contents were placed into the abdomen.  The hernia sac was removed with Bovie.  A large ventral Ashkan ST mesh was placed into the defect.  It was opened against the abdominal wall.  The defect was then closed using 0 Nurolon simple sutures.  Once the hernia defect was closed the wings of the suture were then sutured to the fascia using 3-0 Vicryl suture.  Copious irrigation was carried out.  Hemostasis was perfected.  The wound was then closed in a layered fashion using 2-0 Monocryl and 4-0 Vicryl suture.  More local was injected.  Steri-Strips and sterile dressings were then applied.  Diane then had her anesthesia reversed and she was taken to the recovery area in stable  postoperative condition having tolerated procedure well.        Erin Fried,   11:00 AM

## 2019-06-06 NOTE — ANESTHESIA PREPROCEDURE EVALUATION
Anesthesia Evaluation     Patient summary reviewed and Nursing notes reviewed   no history of anesthetic complications:  NPO Solid Status: > 8 hours  NPO Liquid Status: > 8 hours           Airway   Mallampati: II  Neck ROM: full  Possible difficult intubation, Small opening and Large neck circumference  Comment: Intubated w/ 7.0 ETT previously after unsuccessful attempt w/ 7.5 ETT  Dental      Comment: Perm upper bridge    Pulmonary - normal exam   (+) a smoker Former, sleep apnea on CPAP,   Cardiovascular - normal exam    ECG reviewed  Rhythm: regular  Rate: normal    (+) hypertension well controlled, valvular problems/murmurs MVP, hyperlipidemia,       Neuro/Psych  (+) numbness (legs), psychiatric history Depression,     GI/Hepatic/Renal/Endo    (+) morbid obesity,  diabetes mellitus (A1c 7) type 2 well controlled, hypothyroidism,   (-) liver disease (Steatosis of liver)    Musculoskeletal     (+) back pain (DDD),   Abdominal   (+) obese,    Substance History   (+) alcohol use,      OB/GYN          Other   (+) arthritis (RA)                     Anesthesia Plan    ASA 3     general     intravenous induction   Anesthetic plan, all risks, benefits, and alternatives have been provided, discussed and informed consent has been obtained with: patient.  Use of blood products discussed with patient  Consented to blood products.

## 2019-06-06 NOTE — ANESTHESIA POSTPROCEDURE EVALUATION
Patient: Diane Gerardo    Procedure Summary     Date:  06/06/19 Room / Location:   [facility] /  [facility]    Anesthesia Start:  1023 Anesthesia Stop:  1110    Procedure:  VENTRAL/INCISIONAL HERNIA REPAIR (N/A Abdomen) Diagnosis:       Incisional hernia, without obstruction or gangrene      (Incisional hernia, without obstruction or gangrene [K43.2])    Surgeon:  Erin Fried DO Provider:  Matt Brambila CRNA    Anesthesia Type:  general ASA Status:  3          Anesthesia Type: general  Last vitals  BP   142/78 (06/06/19 1147)   Temp   97.5 °F (36.4 °C) (06/06/19 1110)   Pulse   76 (06/06/19 1147)   Resp   16 (06/06/19 1147)     SpO2   96 % (06/06/19 1147)     Post Anesthesia Care and Evaluation    Patient location during evaluation: PHASE II  Patient participation: complete - patient participated  Level of consciousness: awake  Pain management: adequate  Airway patency: patent  Anesthetic complications: No anesthetic complications  PONV Status: none  Cardiovascular status: acceptable  Respiratory status: acceptable  Hydration status: acceptable

## 2019-06-07 LAB
CYTO UR: NORMAL
LAB AP CASE REPORT: NORMAL
PATH REPORT.FINAL DX SPEC: NORMAL
PATH REPORT.GROSS SPEC: NORMAL

## 2019-06-26 ENCOUNTER — OFFICE VISIT (OUTPATIENT)
Dept: SURGERY | Facility: CLINIC | Age: 64
End: 2019-06-26

## 2019-06-26 DIAGNOSIS — Z87.19 STATUS POST HERNIA REPAIR: Primary | ICD-10-CM

## 2019-06-26 DIAGNOSIS — Z98.890 STATUS POST HERNIA REPAIR: Primary | ICD-10-CM

## 2019-06-26 PROBLEM — K43.9 SPIGELIAN HERNIA: Status: RESOLVED | Noted: 2018-07-18 | Resolved: 2019-06-26

## 2019-06-26 PROBLEM — K43.2 INCISIONAL HERNIA, WITHOUT OBSTRUCTION OR GANGRENE: Status: RESOLVED | Noted: 2019-05-22 | Resolved: 2019-06-26

## 2019-06-26 PROCEDURE — 99024 POSTOP FOLLOW-UP VISIT: CPT | Performed by: SURGERY

## 2019-06-26 NOTE — PROGRESS NOTES
Diane Gerardo 63 y.o. female presents for PO FU IHR 06/06/2019.  Pt states she is feeling better.        HPI   Above noted and agree.      Review of Systems        Past Medical History:   Diagnosis Date   • Anemia     history of   • Anxiety    • Colon polyp    • DDD (degenerative disc disease), lumbosacral    • Depression    • Diabetes mellitus (CMS/HCC)     BORDERLINE   • Disease of thyroid gland     hypothyroid   • Elevated cholesterol    • Hypertension    • IBS (irritable bowel syndrome)    • Mitral valve prolapse    • Rheumatoid arthritis (CMS/HCC)    • Sleep apnea with use of continuous positive airway pressure (CPAP)    • Ventral hernia     sched repair   • Ventral hernia     SCHEDULED FOR REPAIR           Past Surgical History:   Procedure Laterality Date   • ANTERIOR CERVICAL FUSION  2011    C4C5   • CHOLECYSTECTOMY      LAP   • COLONOSCOPY N/A 2/8/2017    Procedure: COLONOSCOPY;  Surgeon: Domi Juarez MD;  Location: [facility] OR;  Service:    • ENDOSCOPY N/A 2/8/2017    Procedure: ESOPHAGOGASTRODUODENOSCOPY;  Surgeon: Domi Juarez MD;  Location: [facility] OR;  Service:    • HYSTERECTOMY      partial 1984, 2002 complete   • LAPAROSCOPIC LYSIS OF ADHESIONS  2002    and cyst removal   • OVARIAN CYST REMOVAL      laparoscopic x2 last 2006   • VENTRAL/INCISIONAL HERNIA REPAIR N/A 8/2/2018    Procedure: VENTRAL AND SPIGELIAN HERNIA REPAIR LAPAROSCOPIC;  Surgeon: Erin Fried DO;  Location: [facility] OR;  Service: General   • VENTRAL/INCISIONAL HERNIA REPAIR N/A 6/6/2019    Procedure: VENTRAL/INCISIONAL HERNIA REPAIR;  Surgeon: Erin Fried DO;  Location: [facility] OR;  Service: General           Physical Exam    General:  Awake and alert with no acute distress  Eyes:  No icterus  Abdomen:  Soft, non-tender  Incision:  Clean, dry, intact    There were no vitals taken for this visit.        Diane was seen today for post-op follow-up.    Diagnoses and all orders for this visit:    Status post hernia  repair    We will follow up in 4 weeks.    Thank you for allowing me to participate in the care of this interesting patient.

## 2019-07-24 ENCOUNTER — OFFICE VISIT (OUTPATIENT)
Dept: SURGERY | Facility: CLINIC | Age: 64
End: 2019-07-24

## 2019-07-24 DIAGNOSIS — Z98.890 STATUS POST HERNIA REPAIR: Primary | ICD-10-CM

## 2019-07-24 DIAGNOSIS — Z87.19 STATUS POST HERNIA REPAIR: Primary | ICD-10-CM

## 2019-07-24 PROCEDURE — 99024 POSTOP FOLLOW-UP VISIT: CPT | Performed by: SURGERY

## 2019-07-24 NOTE — PROGRESS NOTES
Diane Gerardo 63 y.o. female presents for PO FU VHR 06/06/2019.  Pt reports she has been increasing her activity such as raking weeds and in her swimming pool and she feels a little sore.    PCP Dr. Davis.       HPI     Above noted and agree.  Diane has been doing well.      Review of Systems        Past Medical History:   Diagnosis Date   • Anemia     history of   • Anxiety    • Colon polyp    • DDD (degenerative disc disease), lumbosacral    • Depression    • Diabetes mellitus (CMS/HCC)     BORDERLINE   • Disease of thyroid gland     hypothyroid   • Elevated cholesterol    • Hypertension    • IBS (irritable bowel syndrome)    • Mitral valve prolapse    • Rheumatoid arthritis (CMS/HCC)    • Sleep apnea with use of continuous positive airway pressure (CPAP)    • Ventral hernia     sched repair   • Ventral hernia     SCHEDULED FOR REPAIR           Past Surgical History:   Procedure Laterality Date   • ANTERIOR CERVICAL FUSION  2011    C4C5   • CHOLECYSTECTOMY      LAP   • COLONOSCOPY N/A 2/8/2017    Procedure: COLONOSCOPY;  Surgeon: Domi Juarez MD;  Location: [facility] OR;  Service:    • ENDOSCOPY N/A 2/8/2017    Procedure: ESOPHAGOGASTRODUODENOSCOPY;  Surgeon: Domi Juarez MD;  Location: [facility] OR;  Service:    • HYSTERECTOMY      partial 1984, 2002 complete   • LAPAROSCOPIC LYSIS OF ADHESIONS  2002    and cyst removal   • OVARIAN CYST REMOVAL      laparoscopic x2 last 2006   • VENTRAL/INCISIONAL HERNIA REPAIR N/A 8/2/2018    Procedure: VENTRAL AND SPIGELIAN HERNIA REPAIR LAPAROSCOPIC;  Surgeon: Erin Fried DO;  Location: [facility] OR;  Service: General   • VENTRAL/INCISIONAL HERNIA REPAIR N/A 6/6/2019    Procedure: VENTRAL/INCISIONAL HERNIA REPAIR;  Surgeon: Erin Fried DO;  Location: [facility] OR;  Service: General           Physical Exam  General:  Awake and alert with no acute distress  Abdomen:  Soft, non-tender; there is no hernia on valsalva   Incision:  Clean, dry, intact      There were no  vitals taken for this visit.        Diane was seen today for post-op follow-up.    Diagnoses and all orders for this visit:    Status post hernia repair    Return to clinic as needed.    Thank you for allowing me to participate in the care of this interesting patient.

## 2019-09-19 ENCOUNTER — TRANSCRIBE ORDERS (OUTPATIENT)
Dept: ADMINISTRATIVE | Facility: HOSPITAL | Age: 64
End: 2019-09-19

## 2019-09-19 DIAGNOSIS — I11.0 CONGESTIVE HEART FAILURE DUE TO HYPERTENSION (HCC): Primary | ICD-10-CM

## 2019-10-23 ENCOUNTER — HOSPITAL ENCOUNTER (OUTPATIENT)
Dept: CARDIOLOGY | Facility: HOSPITAL | Age: 64
Discharge: HOME | End: 2019-10-23
Admitting: FAMILY MEDICINE

## 2019-10-23 VITALS
HEART RATE: 78 BPM | DIASTOLIC BLOOD PRESSURE: 73 MMHG | WEIGHT: 262 LBS | HEIGHT: 64 IN | BODY MASS INDEX: 44.73 KG/M2 | OXYGEN SATURATION: 95 % | SYSTOLIC BLOOD PRESSURE: 154 MMHG

## 2019-10-23 DIAGNOSIS — I11.0 CONGESTIVE HEART FAILURE DUE TO HYPERTENSION (HCC): ICD-10-CM

## 2019-10-23 PROCEDURE — 25010000002 PERFLUTREN (DEFINITY) 8.476 MG IN SODIUM CHLORIDE 0.9 % 10 ML INJECTION: Performed by: FAMILY MEDICINE

## 2019-10-23 PROCEDURE — 93306 TTE W/DOPPLER COMPLETE: CPT | Performed by: INTERNAL MEDICINE

## 2019-10-23 PROCEDURE — 93306 TTE W/DOPPLER COMPLETE: CPT

## 2019-10-23 RX ADMIN — PERFLUTREN 2 ML: 6.52 INJECTION, SUSPENSION INTRAVENOUS at 14:30

## 2019-10-24 LAB
AORTIC DIMENSIONLESS INDEX: 0.6 (DI)
BH CV ECHO MEAS - ACS: 1.8 CM
BH CV ECHO MEAS - AI DEC SLOPE: 245 CM/SEC^2
BH CV ECHO MEAS - AI MAX PG: 56 MMHG
BH CV ECHO MEAS - AI MAX VEL: 374 CM/SEC
BH CV ECHO MEAS - AI P1/2T: 447.1 MSEC
BH CV ECHO MEAS - AO MAX PG (FULL): 6.5 MMHG
BH CV ECHO MEAS - AO MAX PG: 10.9 MMHG
BH CV ECHO MEAS - AO MEAN PG (FULL): 3 MMHG
BH CV ECHO MEAS - AO MEAN PG: 5 MMHG
BH CV ECHO MEAS - AO ROOT AREA (BSA CORRECTED): 1.6
BH CV ECHO MEAS - AO ROOT AREA: 10.2 CM^2
BH CV ECHO MEAS - AO ROOT DIAM: 3.6 CM
BH CV ECHO MEAS - AO V2 MAX: 165 CM/SEC
BH CV ECHO MEAS - AO V2 MEAN: 108 CM/SEC
BH CV ECHO MEAS - AO V2 VTI: 34.6 CM
BH CV ECHO MEAS - AVA(I,A): 2.6 CM^2
BH CV ECHO MEAS - AVA(I,D): 2.6 CM^2
BH CV ECHO MEAS - AVA(V,A): 2.2 CM^2
BH CV ECHO MEAS - AVA(V,D): 2.2 CM^2
BH CV ECHO MEAS - BSA(HAYCOCK): 2.4 M^2
BH CV ECHO MEAS - BSA: 2.2 M^2
BH CV ECHO MEAS - BZI_BMI: 45 KILOGRAMS/M^2
BH CV ECHO MEAS - BZI_METRIC_HEIGHT: 162.6 CM
BH CV ECHO MEAS - BZI_METRIC_WEIGHT: 118.8 KG
BH CV ECHO MEAS - EDV(CUBED): 117.6 ML
BH CV ECHO MEAS - EDV(MOD-SP2): 115 ML
BH CV ECHO MEAS - EDV(MOD-SP4): 108 ML
BH CV ECHO MEAS - EDV(TEICH): 112.8 ML
BH CV ECHO MEAS - EF(CUBED): 56.2 %
BH CV ECHO MEAS - EF(MOD-BP): 64 %
BH CV ECHO MEAS - EF(MOD-SP2): 62.7 %
BH CV ECHO MEAS - EF(MOD-SP4): 62.8 %
BH CV ECHO MEAS - EF(TEICH): 47.8 %
BH CV ECHO MEAS - ESV(CUBED): 51.5 ML
BH CV ECHO MEAS - ESV(MOD-SP2): 42.9 ML
BH CV ECHO MEAS - ESV(MOD-SP4): 40.2 ML
BH CV ECHO MEAS - ESV(TEICH): 58.9 ML
BH CV ECHO MEAS - FS: 24.1 %
BH CV ECHO MEAS - IVS/LVPW: 1
BH CV ECHO MEAS - IVSD: 1 CM
BH CV ECHO MEAS - LA DIMENSION: 3.3 CM
BH CV ECHO MEAS - LA/AO: 0.92
BH CV ECHO MEAS - LAT PEAK E' VEL: 8 CM/SEC
BH CV ECHO MEAS - LV DIASTOLIC VOL/BSA (35-75): 49.2 ML/M^2
BH CV ECHO MEAS - LV MASS(C)D: 178.4 GRAMS
BH CV ECHO MEAS - LV MASS(C)DI: 81.3 GRAMS/M^2
BH CV ECHO MEAS - LV MAX PG: 4.4 MMHG
BH CV ECHO MEAS - LV MEAN PG: 2 MMHG
BH CV ECHO MEAS - LV SYSTOLIC VOL/BSA (12-30): 18.3 ML/M^2
BH CV ECHO MEAS - LV V1 MAX: 105 CM/SEC
BH CV ECHO MEAS - LV V1 MEAN: 68.9 CM/SEC
BH CV ECHO MEAS - LV V1 VTI: 25.6 CM
BH CV ECHO MEAS - LVIDD: 4.9 CM
BH CV ECHO MEAS - LVIDS: 3.7 CM
BH CV ECHO MEAS - LVLD AP2: 8.6 CM
BH CV ECHO MEAS - LVLD AP4: 8 CM
BH CV ECHO MEAS - LVLS AP2: 7.3 CM
BH CV ECHO MEAS - LVLS AP4: 7 CM
BH CV ECHO MEAS - LVOT AREA (M): 3.5 CM^2
BH CV ECHO MEAS - LVOT AREA: 3.5 CM^2
BH CV ECHO MEAS - LVOT DIAM: 2.1 CM
BH CV ECHO MEAS - LVPWD: 1 CM
BH CV ECHO MEAS - MED PEAK E' VEL: 8 CM/SEC
BH CV ECHO MEAS - MV A DUR: 0.19 SEC
BH CV ECHO MEAS - MV A MAX VEL: 118 CM/SEC
BH CV ECHO MEAS - MV DEC SLOPE: 451 CM/SEC^2
BH CV ECHO MEAS - MV DEC TIME: 200 SEC
BH CV ECHO MEAS - MV E MAX VEL: 98 CM/SEC
BH CV ECHO MEAS - MV E/A: 0.83
BH CV ECHO MEAS - MV MAX PG: 5.6 MMHG
BH CV ECHO MEAS - MV MEAN PG: 2 MMHG
BH CV ECHO MEAS - MV P1/2T MAX VEL: 105 CM/SEC
BH CV ECHO MEAS - MV P1/2T: 68.2 MSEC
BH CV ECHO MEAS - MV V2 MAX: 118 CM/SEC
BH CV ECHO MEAS - MV V2 MEAN: 60.3 CM/SEC
BH CV ECHO MEAS - MV V2 VTI: 36.4 CM
BH CV ECHO MEAS - MVA P1/2T LCG: 2.1 CM^2
BH CV ECHO MEAS - MVA(P1/2T): 3.2 CM^2
BH CV ECHO MEAS - MVA(VTI): 2.4 CM^2
BH CV ECHO MEAS - PA ACC TIME: 0.11 SEC
BH CV ECHO MEAS - PA MAX PG (FULL): 2.6 MMHG
BH CV ECHO MEAS - PA MAX PG: 4.1 MMHG
BH CV ECHO MEAS - PA PR(ACCEL): 30 MMHG
BH CV ECHO MEAS - PA V2 MAX: 101 CM/SEC
BH CV ECHO MEAS - PULM A REVS DUR: 0.19 SEC
BH CV ECHO MEAS - PULM A REVS VEL: 34.1 CM/SEC
BH CV ECHO MEAS - PULM DIAS VEL: 43.4 CM/SEC
BH CV ECHO MEAS - PULM S/D: 1.4
BH CV ECHO MEAS - PULM SYS VEL: 61.2 CM/SEC
BH CV ECHO MEAS - PVA(V,A): 1.9 CM^2
BH CV ECHO MEAS - PVA(V,D): 1.9 CM^2
BH CV ECHO MEAS - QP/QS: 0.44
BH CV ECHO MEAS - RV MAX PG: 1.5 MMHG
BH CV ECHO MEAS - RV MEAN PG: 1 MMHG
BH CV ECHO MEAS - RV V1 MAX: 60.6 CM/SEC
BH CV ECHO MEAS - RV V1 MEAN: 37.7 CM/SEC
BH CV ECHO MEAS - RV V1 VTI: 12.3 CM
BH CV ECHO MEAS - RVOT AREA: 3.1 CM^2
BH CV ECHO MEAS - RVOT DIAM: 2 CM
BH CV ECHO MEAS - SI(AO): 160.5 ML/M^2
BH CV ECHO MEAS - SI(CUBED): 30.2 ML/M^2
BH CV ECHO MEAS - SI(LVOT): 40.4 ML/M^2
BH CV ECHO MEAS - SI(MOD-SP2): 32.9 ML/M^2
BH CV ECHO MEAS - SI(MOD-SP4): 30.9 ML/M^2
BH CV ECHO MEAS - SI(TEICH): 24.6 ML/M^2
BH CV ECHO MEAS - SV(AO): 352.2 ML
BH CV ECHO MEAS - SV(CUBED): 66.2 ML
BH CV ECHO MEAS - SV(LVOT): 88.7 ML
BH CV ECHO MEAS - SV(MOD-SP2): 72.1 ML
BH CV ECHO MEAS - SV(MOD-SP4): 67.8 ML
BH CV ECHO MEAS - SV(RVOT): 38.6 ML
BH CV ECHO MEAS - SV(TEICH): 53.9 ML
BH CV ECHO MEAS - TAPSE (>1.6): 1.7 CM
BH CV ECHO MEASUREMENTS AVERAGE E/E' RATIO: 12.25
BH CV VAS BP RIGHT ARM: NORMAL MMHG
BH CV XLRA - RV BASE: 3.2 CM
BH CV XLRA - TDI S': 14 CM/SEC
LEFT ATRIUM VOLUME INDEX: 62 ML/M2
LEFT ATRIUM VOLUME: 62 CM3
MAXIMAL PREDICTED HEART RATE: 157 BPM
STRESS TARGET HR: 133 BPM

## 2019-11-12 ENCOUNTER — OFFICE VISIT (OUTPATIENT)
Dept: CARDIOLOGY | Facility: CLINIC | Age: 64
End: 2019-11-12

## 2019-11-12 VITALS
SYSTOLIC BLOOD PRESSURE: 132 MMHG | HEIGHT: 64 IN | HEART RATE: 73 BPM | WEIGHT: 265 LBS | DIASTOLIC BLOOD PRESSURE: 83 MMHG | BODY MASS INDEX: 45.24 KG/M2

## 2019-11-12 DIAGNOSIS — R07.2 PRECORDIAL PAIN: Primary | ICD-10-CM

## 2019-11-12 DIAGNOSIS — E66.01 MORBID OBESITY WITH BMI OF 40.0-44.9, ADULT (HCC): ICD-10-CM

## 2019-11-12 DIAGNOSIS — E78.5 HYPERLIPIDEMIA, UNSPECIFIED HYPERLIPIDEMIA TYPE: ICD-10-CM

## 2019-11-12 DIAGNOSIS — I10 ESSENTIAL HYPERTENSION: ICD-10-CM

## 2019-11-12 DIAGNOSIS — R06.02 SOB (SHORTNESS OF BREATH): ICD-10-CM

## 2019-11-12 PROCEDURE — 93000 ELECTROCARDIOGRAM COMPLETE: CPT | Performed by: INTERNAL MEDICINE

## 2019-11-12 PROCEDURE — 99203 OFFICE O/P NEW LOW 30 MIN: CPT | Performed by: INTERNAL MEDICINE

## 2019-11-12 RX ORDER — ATORVASTATIN CALCIUM 20 MG/1
20 TABLET, FILM COATED ORAL DAILY
Refills: 3 | COMMUNITY
Start: 2019-10-28 | End: 2022-04-18

## 2019-11-12 RX ORDER — LISINOPRIL 10 MG/1
10 TABLET ORAL DAILY
Refills: 3 | COMMUNITY
Start: 2019-10-29

## 2019-11-12 NOTE — PROGRESS NOTES
Subjective:        [facility]  Cardiology Consult Note    Patient Identification:  Name: Diane Gerardo  Age: 64 y.o.  Sex: female  :  1955  MRN: [id number]             CC  WEAKNESS, LAST 5 MIN  FOR 6 MONTHS    SOB    LEG SWELLING  QUITE SMOKING   DIABILITY DUETO ARTHRITIS    NEAR SYNCOPE      History of Present Illness:   64-year-old female here for the cardiac evaluation as well as establishment of the care as the patient has been complaining of the weakness lasting for approximately 5 minutes for the last 6 months intermittent usually at rest    Patient also complains of the shortness of breath    Diane Gerardo has been complaining of the shortness of breath mild-to-moderate in intensity with mild-to-moderate usually relieved with rest associated with anxiety and fatigue    Patient also complains of bilateral mild swelling    Comorbid cardiac risk factors:     Past Medical History:  Past Medical History:   Diagnosis Date   • Anemia     history of   • Anxiety    • Colon polyp    • DDD (degenerative disc disease), lumbosacral    • Depression    • Diabetes mellitus (CMS/HCC)     BORDERLINE   • Disease of thyroid gland     hypothyroid   • Elevated cholesterol    • Hypertension    • IBS (irritable bowel syndrome)    • Mitral valve prolapse    • Rheumatoid arthritis (CMS/HCC)    • Sleep apnea with use of continuous positive airway pressure (CPAP)    • Ventral hernia     sched repair   • Ventral hernia     SCHEDULED FOR REPAIR     Past Surgical History:  Past Surgical History:   Procedure Laterality Date   • ANTERIOR CERVICAL FUSION      C4C5   • CHOLECYSTECTOMY      LAP   • COLONOSCOPY N/A 2017    Procedure: COLONOSCOPY;  Surgeon: Domi Juarez MD;  Location: [facility] OR;  Service:    • ENDOSCOPY N/A 2017    Procedure: ESOPHAGOGASTRODUODENOSCOPY;  Surgeon: Domi Juarez MD;  Location: [facility] OR;  Service:    • HYSTERECTOMY      partial ,  complete   • LAPAROSCOPIC LYSIS OF  ADHESIONS      and cyst removal   • OVARIAN CYST REMOVAL      laparoscopic x2 last    • VENTRAL/INCISIONAL HERNIA REPAIR N/A 2018    Procedure: VENTRAL AND SPIGELIAN HERNIA REPAIR LAPAROSCOPIC;  Surgeon: Erin Fried DO;  Location:  [facility] OR;  Service: General   • VENTRAL/INCISIONAL HERNIA REPAIR N/A 2019    Procedure: VENTRAL/INCISIONAL HERNIA REPAIR;  Surgeon: Erin Fried DO;  Location:  [facility] OR;  Service: General      Allergies:  Allergies   Allergen Reactions   • Penicillins Hives and Swelling     Felt like throat was swelling  Hives   • Sulfa Antibiotics Hives     Hives, swelling.   • Nsaids Hives and Swelling     Home Meds:    (Not in a hospital admission)  Current Meds:   [unfilled]  Social History:   Social History     Tobacco Use   • Smoking status: Former Smoker     Packs/day: 1.00     Years: 10.00     Pack years: 10.00     Types: Cigarettes     Last attempt to quit:      Years since quittin.8   • Smokeless tobacco: Never Used   Substance Use Topics   • Alcohol use: Yes     Comment: occasional      Family History:  Family History   Problem Relation Age of Onset   • Diabetes Mother    • COPD Mother    • Heart disease Father    • Cancer Father    • Prostate cancer Father    • Aneurysm Father    • Diabetes Brother    • Pancreatitis Brother    • Malig Hyperthermia Neg Hx    • Breast cancer Neg Hx       Interpretation Summary     · Left atrial cavity size is moderately dilated.  · There is calcification of the aortic valve.  · Mild mitral valve regurgitation is present  · Mild tricuspid valve regurgitation is present.  · Calculated EF = 64.0%  · There is no evidence of pericardial effusion.       Review of Systems    Constitutional: No weakness,fatigue, fever, rigors, chills   Eyes: No vision changes, eye pain   ENT/oropharynx: No difficulty swallowing, sore throat, epistaxis, changes in hearing   Cardiovascular: No chest pain, chest tightness, palpitations, paroxysmal  nocturnal dyspnea, orthopnea, diaphoresis, dizziness / syncopal episode   Respiratory:  Mild shortness of breath, dyspnea on exertion, cough, wheezing hemoptysis   Gastrointestinal: No abdominal pain, nausea, vomiting, diarrhea, bloody stools   Genitourinary: No hematuria, dysuria   Neurological: No headache, tremors, numbness,  one-sided weakness    Musculoskeletal: No cramps, myalgias,  joint pain, joint swelling   Integument: No rash, edema           Constitutional:  Heart Rate:  [73] 73  BP: (132)/(83) 132/83    Physical Exam   General:  Appears in no acute distress  Eyes: PERTL,  HEENT:  No JVD. Thyroid not visibly enlarged. No mucosal pallor or cyanosis  Respiratory: Respirations regular and unlabored at rest. BBS with good air entry in all fields. No crackles, rubs or wheezes auscultated  Cardiovascular: S1S2 Regular rate and rhythm. No murmur, rub or gallop auscultated. No carotid bruits. DP/PT pulses    . No pretibial pitting edema  Gastrointestinal: Abdomen soft, flat, non tender. Bowel sounds present. No hepatosplenomegaly. No ascites  Musculoskeletal: HOWARD x4. No abnormal movements  Extremities: No digital clubbing or cyanosis  Skin: Color pink. Skin warm and dry to touch. No rashes  No xanthoma  Neuro: AAO x3 CN II-XII grossly intact            ECG 12 Lead  Date/Time: 11/12/2019 1:32 PM  Performed by: Madan Hu MD  Authorized by: Madan Hu MD   Comparison: not compared with previous ECG   Previous ECG: no previous ECG available  Rhythm: sinus rhythm  ST Flattening: all    Clinical impression: non-specific ECG                Cardiographics  ECG:     Telemetry:    Echocardiogram:     Imaging  Chest X-ray:     Lab Review               @[email or web address]@              Assessment:/ Recommendations / Plan:   Patient Active Problem List   Diagnosis   • Osteoarthritis of both knees   • Anemia of chronic disorder   • Arthralgia of multiple joints   • Chronic depression   • Type 2 diabetes  mellitus (CMS/[facility])   • Steatosis of liver   • Hypothyroidism due to Hashimoto's thyroiditis   • Lactose intolerance   • Non-toxic multinodular goiter   • Obstructive sleep apnea syndrome   • Hypothyroidism   • Rheumatoid arthritis involving multiple joints (CMS/[facility])   • Snoring   • Vitamin D deficiency   • Degeneration of intervertebral disc of lumbosacral region   • History of colonic polyps   • Hypercholesterolemia   • Morbid obesity (CMS/[facility])   • Primary osteoarthritis of both knees   • Seasonal allergic rhinitis   • Wears eyeglasses   • Gastric polyps   • Internal hemorrhoids   • Gastritis                    ICD-10-CM ICD-9-CM   1. Precordial pain R07.2 786.51   2. Morbid obesity with BMI of 40.0-44.9, adult (CMS/[facility]) E66.01 278.01    Z68.41 V85.41   3. SOB (shortness of breath) R06.02 786.05   4. Essential hypertension I10 401.9   5. Hyperlipidemia, unspecified hyperlipidemia type E78.5 272.4     1. Precordial pain  Considering the patient's symptoms as well as clinical situation and  EKG findings, along with cardiac risk factors, ischemic workup is necessary to rule out ischemic cardiomyopathy, stress induced arrhythmias, and functional capacity for diagnosis as well as prognostic consideration    - Stress Test With Myocardial Perfusion One Day  - Tilt Table; Future    2. Morbid obesity with BMI of 40.0-44.9, adult (CMS/[facility])  Significant risk of obesity to CAD, HTN has been explained  Advantages of wt reduction has been explained    - Stress Test With Myocardial Perfusion One Day  - Tilt Table; Future    3. SOB (shortness of breath)  Multifactorial  - Stress Test With Myocardial Perfusion One Day  - Tilt Table; Future    4. Essential hypertension  Blood pressure stable  - Stress Test With Myocardial Perfusion One Day  - Tilt Table; Future    5. Hyperlipidemia, unspecified hyperlipidemia type  Continue current medications  - Stress Test With Myocardial Perfusion One Day  - Tilt Table; Future       WALKING  LEXISCAN    TILT TABLE    SEE IN 1 MONTH    Labs/tests ordered for am      Madan Hu MD  11/12/2019, 1:31 PM      EMR Dragon/Transcription:   Dictated utilizing Dragon dictation

## 2019-12-04 ENCOUNTER — HOSPITAL ENCOUNTER (OUTPATIENT)
Dept: CARDIOLOGY | Facility: HOSPITAL | Age: 64
Discharge: HOME | End: 2019-12-04
Admitting: INTERNAL MEDICINE

## 2019-12-04 VITALS
HEART RATE: 69 BPM | TEMPERATURE: 97.1 F | WEIGHT: 262 LBS | OXYGEN SATURATION: 98 % | DIASTOLIC BLOOD PRESSURE: 75 MMHG | BODY MASS INDEX: 44.73 KG/M2 | SYSTOLIC BLOOD PRESSURE: 140 MMHG | RESPIRATION RATE: 18 BRPM | HEIGHT: 64 IN

## 2019-12-04 DIAGNOSIS — E66.01 MORBID OBESITY WITH BMI OF 40.0-44.9, ADULT (HCC): ICD-10-CM

## 2019-12-04 DIAGNOSIS — E78.5 HYPERLIPIDEMIA, UNSPECIFIED HYPERLIPIDEMIA TYPE: ICD-10-CM

## 2019-12-04 DIAGNOSIS — I10 ESSENTIAL HYPERTENSION: ICD-10-CM

## 2019-12-04 DIAGNOSIS — R07.2 PRECORDIAL PAIN: ICD-10-CM

## 2019-12-04 DIAGNOSIS — R06.02 SOB (SHORTNESS OF BREATH): ICD-10-CM

## 2019-12-04 LAB — GLUCOSE BLDC GLUCOMTR-MCNC: 150 MG/DL (ref 70–130)

## 2019-12-04 PROCEDURE — 93660 TILT TABLE EVALUATION: CPT

## 2019-12-04 PROCEDURE — 82962 GLUCOSE BLOOD TEST: CPT

## 2019-12-04 PROCEDURE — 93660 TILT TABLE EVALUATION: CPT | Performed by: INTERNAL MEDICINE

## 2019-12-04 RX ORDER — NITROGLYCERIN 0.4 MG/1
0.4 TABLET SUBLINGUAL
Status: DISCONTINUED | OUTPATIENT
Start: 2019-12-04 | End: 2019-12-05 | Stop reason: HOSPADM

## 2019-12-04 RX ORDER — SODIUM CHLORIDE 0.9 % (FLUSH) 0.9 %
10 SYRINGE (ML) INJECTION AS NEEDED
Status: DISCONTINUED | OUTPATIENT
Start: 2019-12-04 | End: 2019-12-04 | Stop reason: HOSPADM

## 2019-12-04 RX ORDER — SODIUM CHLORIDE 9 MG/ML
75 INJECTION, SOLUTION INTRAVENOUS CONTINUOUS
Status: DISCONTINUED | OUTPATIENT
Start: 2019-12-04 | End: 2019-12-05 | Stop reason: HOSPADM

## 2019-12-04 RX ORDER — SODIUM CHLORIDE 0.9 % (FLUSH) 0.9 %
3 SYRINGE (ML) INJECTION EVERY 12 HOURS SCHEDULED
Status: DISCONTINUED | OUTPATIENT
Start: 2019-12-04 | End: 2019-12-04 | Stop reason: HOSPADM

## 2019-12-05 LAB — BH CV TILT AGENT USED: NORMAL

## 2019-12-06 ENCOUNTER — HOSPITAL ENCOUNTER (OUTPATIENT)
Dept: NUCLEAR MEDICINE | Facility: HOSPITAL | Age: 64
Discharge: HOME | End: 2019-12-06

## 2019-12-06 ENCOUNTER — HOSPITAL ENCOUNTER (OUTPATIENT)
Dept: CARDIOLOGY | Facility: HOSPITAL | Age: 64
Discharge: HOME | End: 2019-12-06

## 2019-12-06 PROCEDURE — A9500 TC99M SESTAMIBI: HCPCS | Performed by: INTERNAL MEDICINE

## 2019-12-06 PROCEDURE — 25010000002 REGADENOSON 0.4 MG/5ML SOLUTION: Performed by: INTERNAL MEDICINE

## 2019-12-06 PROCEDURE — 78452 HT MUSCLE IMAGE SPECT MULT: CPT | Performed by: INTERNAL MEDICINE

## 2019-12-06 PROCEDURE — 0 TECHNETIUM SESTAMIBI: Performed by: INTERNAL MEDICINE

## 2019-12-06 PROCEDURE — 78452 HT MUSCLE IMAGE SPECT MULT: CPT

## 2019-12-06 PROCEDURE — 93018 CV STRESS TEST I&R ONLY: CPT | Performed by: INTERNAL MEDICINE

## 2019-12-06 PROCEDURE — 93017 CV STRESS TEST TRACING ONLY: CPT

## 2019-12-06 PROCEDURE — 93016 CV STRESS TEST SUPVJ ONLY: CPT | Performed by: INTERNAL MEDICINE

## 2019-12-06 RX ADMIN — TECHNETIUM TC 99M SESTAMIBI 1 DOSE: 1 INJECTION INTRAVENOUS at 08:08

## 2019-12-06 RX ADMIN — REGADENOSON 0.4 MG: 0.08 INJECTION, SOLUTION INTRAVENOUS at 10:00

## 2019-12-06 RX ADMIN — TECHNETIUM TC 99M SESTAMIBI 1 DOSE: 1 INJECTION INTRAVENOUS at 10:00

## 2019-12-07 LAB
BH CV NUCLEAR PRIOR STUDY: 3
BH CV STRESS BP STAGE 1: NORMAL
BH CV STRESS COMMENTS STAGE 1: NORMAL
BH CV STRESS DOSE REGADENOSON STAGE 1: 0.4
BH CV STRESS DURATION MIN STAGE 1: 0
BH CV STRESS DURATION SEC STAGE 1: 30
BH CV STRESS HR STAGE 1: 63
BH CV STRESS O2 STAGE 1: 99
BH CV STRESS PROTOCOL 1: NORMAL
BH CV STRESS RECOVERY BP: NORMAL MMHG
BH CV STRESS RECOVERY HR: 85 BPM
BH CV STRESS RECOVERY O2: 99 %
BH CV STRESS STAGE 1: 1
LV EF NUC BP: 60 %
MAXIMAL PREDICTED HEART RATE: 157 BPM
PERCENT MAX PREDICTED HR: 59.87 %
STRESS BASELINE BP: NORMAL MMHG
STRESS BASELINE HR: 66 BPM
STRESS O2 SAT REST: 100 %
STRESS PERCENT HR: 70 %
STRESS POST ESTIMATED WORKLOAD: 1 METS
STRESS POST EXERCISE DUR SEC: 30 SEC
STRESS POST O2 SAT PEAK: 100 %
STRESS POST PEAK BP: NORMAL MMHG
STRESS POST PEAK HR: 94 BPM
STRESS TARGET HR: 133 BPM

## 2020-06-24 ENCOUNTER — OFFICE VISIT (OUTPATIENT)
Dept: SURGERY | Facility: CLINIC | Age: 65
End: 2020-06-24

## 2020-06-24 VITALS
WEIGHT: 262 LBS | RESPIRATION RATE: 20 BRPM | TEMPERATURE: 98.1 F | HEIGHT: 64 IN | BODY MASS INDEX: 44.73 KG/M2 | SYSTOLIC BLOOD PRESSURE: 152 MMHG | HEART RATE: 72 BPM | DIASTOLIC BLOOD PRESSURE: 80 MMHG

## 2020-06-24 DIAGNOSIS — E66.01 MORBID OBESITY WITH BMI OF 40.0-44.9, ADULT (HCC): ICD-10-CM

## 2020-06-24 DIAGNOSIS — R11.0 NAUSEA: ICD-10-CM

## 2020-06-24 DIAGNOSIS — R10.9 RIGHT SIDED ABDOMINAL PAIN: Primary | ICD-10-CM

## 2020-06-24 PROCEDURE — 99213 OFFICE O/P EST LOW 20 MIN: CPT | Performed by: SURGERY

## 2020-06-24 NOTE — PROGRESS NOTES
Diane Gerardo 64 y.o. female presents as a self ref for eval poss abd hernia.  Pt states she noticed a bulge RUQ approx 1 week ago and reports + pain.   Chief Complaint   Patient presents with   • Abdominal Pain     RUQ             HPI   Above-noted and agree.  Diane is well-known to my service.  I performed a laparoscopic repair of a left-sided spigelian hernia.  She then developed an incisional hernia at 1 of the port sites which we repaired as an open procedure.  She presents today with complaints of right-sided abdominal pain.  The pain has been going on for several weeks but then became much more severe and she noticed a bulge.  The pain can be so severe that it causes her nausea.  She took a bottle of magnesium citrate because she thought maybe it was secondary to constipation.  She moves her bowels very well but it did not resolve the pain.  She had a colonoscopy back in 2017 that was essentially normal.  She has no fevers or chills.  She has no chest pain or shortness of breath.      Review of Systems          Current Outpatient Medications:   •  atorvastatin (LIPITOR) 20 MG tablet, Take 20 mg by mouth Daily., Disp: , Rfl: 3  •  cholecalciferol (VITAMIN D3) 1000 UNITS tablet, Take 1,000 Units by mouth Daily., Disp: , Rfl:   •  hydrochlorothiazide (HYDRODIURIL) 25 MG tablet, Take 25 mg by mouth Every Morning., Disp: , Rfl:   •  levothyroxine (SYNTHROID, LEVOTHROID) 150 MCG tablet, Take 150 mcg by mouth Every Morning., Disp: , Rfl:   •  lisinopril (PRINIVIL,ZESTRIL) 10 MG tablet, Take 10 mg by mouth Daily., Disp: , Rfl: 3  •  metFORMIN (GLUCOPHAGE) 1000 MG tablet, Take 1,000 mg by mouth Every Evening., Disp: , Rfl:   •  Tofacitinib Citrate (XELJANZ) 5 MG tablet, Take 1 tablet by mouth 2 (Two) Times a Day., Disp: , Rfl:         Allergies   Allergen Reactions   • Aspirin Hives   • Penicillins Hives and Swelling     Felt like throat was swelling  Hives   • Sulfa Antibiotics Hives     Hives, swelling.   • Nsaids  Hives and Swelling           Past Medical History:   Diagnosis Date   • Anemia     history of   • Anxiety    • Colon polyp    • DDD (degenerative disc disease), lumbosacral    • Depression    • Diabetes mellitus (CMS/HCC)     BORDERLINE   • Disease of thyroid gland     hypothyroid   • Elevated cholesterol    • Hypertension    • IBS (irritable bowel syndrome)    • Mitral valve prolapse    • Rheumatoid arthritis (CMS/HCC)    • Sleep apnea with use of continuous positive airway pressure (CPAP)    • Ventral hernia     sched repair   • Ventral hernia     SCHEDULED FOR REPAIR           Past Surgical History:   Procedure Laterality Date   • ANTERIOR CERVICAL FUSION      C4C5   • CHOLECYSTECTOMY      LAP   • COLONOSCOPY N/A 2017    Procedure: COLONOSCOPY;  Surgeon: Domi Juarez MD;  Location: [facility] OR;  Service:    • ENDOSCOPY N/A 2017    Procedure: ESOPHAGOGASTRODUODENOSCOPY;  Surgeon: Domi Juarez MD;  Location: [facility] OR;  Service:    • HYSTERECTOMY      partial ,  complete   • LAPAROSCOPIC LYSIS OF ADHESIONS      and cyst removal   • OVARIAN CYST REMOVAL      laparoscopic x2 last    • VENTRAL/INCISIONAL HERNIA REPAIR N/A 2018    Procedure: VENTRAL AND SPIGELIAN HERNIA REPAIR LAPAROSCOPIC;  Surgeon: Erin Fried DO;  Location: [facility] OR;  Service: General   • VENTRAL/INCISIONAL HERNIA REPAIR N/A 2019    Procedure: VENTRAL/INCISIONAL HERNIA REPAIR;  Surgeon: Erin Fried DO;  Location: [facility] OR;  Service: General           Social History     Tobacco Use   • Smoking status: Former Smoker     Packs/day: 1.00     Years: 10.00     Pack years: 10.00     Types: Cigarettes     Last attempt to quit:      Years since quittin.4   • Smokeless tobacco: Never Used   Substance Use Topics   • Alcohol use: Yes     Comment: occasional   • Drug use: No             There is no immunization history on file for this patient.        Physical Exam   Constitutional: She is  "oriented to person, place, and time. She appears well-developed and well-nourished.   Cardiovascular: Normal rate and regular rhythm.   Pulmonary/Chest: Effort normal and breath sounds normal.   Abdominal: Soft. Bowel sounds are normal.   There are no hernias noted on Valsalva   Musculoskeletal: She exhibits no edema or deformity.   Neurological: She is alert and oriented to person, place, and time.   Skin: Skin is warm and dry.   Psychiatric: She has a normal mood and affect.   Nursing note and vitals reviewed.      Debilities/Disabilities Identified: None    Emotional Behavior: Appropriate      /80   Pulse 72   Temp 98.1 °F (36.7 °C)   Resp 20   Ht 162.6 cm (64\")   Wt 119 kg (262 lb)   BMI 44.97 kg/m²         Diane was seen today for abdominal pain.    Diagnoses and all orders for this visit:    Right sided abdominal pain    Nausea    Morbid obesity with BMI of 40.0-44.9, adult (CMS/HCC)    I cannot palpate any abnormalities.  We will obtain a CT scan of the abdomen and pelvis to further evaluate this area.    Thank you for allowing me to participate in the care of this interesting patient.        "

## 2020-06-25 DIAGNOSIS — R10.84 GENERALIZED ABDOMINAL PAIN: Primary | ICD-10-CM

## 2020-06-30 ENCOUNTER — HOSPITAL ENCOUNTER (OUTPATIENT)
Dept: CT IMAGING | Facility: HOSPITAL | Age: 65
Discharge: HOME | End: 2020-06-30
Admitting: SURGERY

## 2020-06-30 DIAGNOSIS — R10.84 GENERALIZED ABDOMINAL PAIN: ICD-10-CM

## 2020-06-30 LAB — CREAT BLDA-MCNC: 0.9 MG/DL (ref 0.6–1.3)

## 2020-06-30 PROCEDURE — 82565 ASSAY OF CREATININE: CPT

## 2020-06-30 PROCEDURE — 0 DIATRIZOATE MEGLUMINE & SODIUM PER 1 ML: Performed by: SURGERY

## 2020-06-30 PROCEDURE — 0 IOPAMIDOL PER 1 ML: Performed by: SURGERY

## 2020-06-30 PROCEDURE — 74177 CT ABD & PELVIS W/CONTRAST: CPT

## 2020-06-30 RX ADMIN — DIATRIZOATE MEGLUMINE AND DIATRIZOATE SODIUM 30 ML: 600; 100 SOLUTION ORAL; RECTAL at 06:30

## 2020-06-30 RX ADMIN — IOPAMIDOL 100 ML: 755 INJECTION, SOLUTION INTRAVENOUS at 08:15

## 2020-07-01 ENCOUNTER — OFFICE VISIT (OUTPATIENT)
Dept: SURGERY | Facility: CLINIC | Age: 65
End: 2020-07-01

## 2020-07-01 VITALS — TEMPERATURE: 99 F

## 2020-07-01 DIAGNOSIS — Z87.19 HISTORY OF GASTRITIS: ICD-10-CM

## 2020-07-01 DIAGNOSIS — R10.11 RUQ PAIN: Primary | ICD-10-CM

## 2020-07-01 DIAGNOSIS — Z87.19 HISTORY OF GASTRIC POLYP: ICD-10-CM

## 2020-07-01 PROCEDURE — 99214 OFFICE O/P EST MOD 30 MIN: CPT | Performed by: SURGERY

## 2020-07-01 NOTE — PROGRESS NOTES
Diane Gerardo 64 y.o. female presents for  FU/DISCUSS CT.         HPI   Above noted and agree.  Diane's CT scan was negative for any hernias.  She is still having RUQ pain.  She says it is severe.  She had an EGD back in 2017 that showed gastritis and gastric polyps.  She does get bloating.  She has no chest pain or shortness of breath.  She does not smoke or use tobacco products.  She has no fevers or chills.  She has no other complaints.      Review of Systems   All other systems reviewed and are negative.          Past Medical History:   Diagnosis Date   • Anemia     history of   • Anxiety    • Colon polyp    • DDD (degenerative disc disease), lumbosacral    • Depression    • Diabetes mellitus (CMS/HCC)     BORDERLINE   • Disease of thyroid gland     hypothyroid   • Elevated cholesterol    • Hypertension    • IBS (irritable bowel syndrome)    • Mitral valve prolapse    • Rheumatoid arthritis (CMS/HCC)    • Sleep apnea with use of continuous positive airway pressure (CPAP)    • Ventral hernia     sched repair   • Ventral hernia     SCHEDULED FOR REPAIR           Past Surgical History:   Procedure Laterality Date   • ANTERIOR CERVICAL FUSION  2011    C4C5   • CHOLECYSTECTOMY      LAP   • COLONOSCOPY N/A 2/8/2017    Procedure: COLONOSCOPY;  Surgeon: Domi Juarez MD;  Location: [facility] OR;  Service:    • ENDOSCOPY N/A 2/8/2017    Procedure: ESOPHAGOGASTRODUODENOSCOPY;  Surgeon: Domi Juarez MD;  Location: [facility] OR;  Service:    • HYSTERECTOMY      partial 1984, 2002 complete   • LAPAROSCOPIC LYSIS OF ADHESIONS  2002    and cyst removal   • OVARIAN CYST REMOVAL      laparoscopic x2 last 2006   • VENTRAL/INCISIONAL HERNIA REPAIR N/A 8/2/2018    Procedure: VENTRAL AND SPIGELIAN HERNIA REPAIR LAPAROSCOPIC;  Surgeon: Erin Fried DO;  Location: [facility] OR;  Service: General   • VENTRAL/INCISIONAL HERNIA REPAIR N/A 6/6/2019    Procedure: VENTRAL/INCISIONAL HERNIA REPAIR;  Surgeon: Erin Fried DO;   Location: [facility] OR;  Service: General           Physical Exam   Constitutional: She is oriented to person, place, and time. She appears well-developed and well-nourished.   HENT:   Head: Normocephalic and atraumatic.   Right Ear: External ear normal.   Left Ear: External ear normal.   Cardiovascular: Normal rate and regular rhythm.   Pulmonary/Chest: Effort normal and breath sounds normal.   Abdominal: Soft. Bowel sounds are normal.   Musculoskeletal: She exhibits no edema or deformity.   Neurological: She is alert and oriented to person, place, and time.   Skin: Skin is warm and dry.   Psychiatric: She has a normal mood and affect. Her behavior is normal.   Nursing note reviewed.      No debilities noted    Temp 99 °F (37.2 °C)         Diane was seen today for follow-up.    Diagnoses and all orders for this visit:    RUQ pain    History of gastritis    History of gastric polyp    We will get Diane scheduled for an EGD.  I discussed with the patient the benefits and risks of performing endoscopy.  Benefits and risks were not limited to but including bleeding, infection, perforation, complications of anesthesia, aspiration.  The patient appeared to understand and is willing to proceed.    Thank you for allowing me to participate in the care of this interesting patient.

## 2020-07-01 NOTE — H&P
Diane Gerardo 64 y.o. female presents for  FU/DISCUSS CT.         HPI   Above noted and agree.  Diane's CT scan was negative for any hernias.  She is still having RUQ pain.  She says it is severe.  She had an EGD back in 2017 that showed gastritis and gastric polyps.  She does get bloating.  She has no chest pain or shortness of breath.  She does not smoke or use tobacco products.  She has no fevers or chills.  She has no other complaints.      Review of Systems   All other systems reviewed and are negative.          Past Medical History:   Diagnosis Date   • Anemia     history of   • Anxiety    • Colon polyp    • DDD (degenerative disc disease), lumbosacral    • Depression    • Diabetes mellitus (CMS/HCC)     BORDERLINE   • Disease of thyroid gland     hypothyroid   • Elevated cholesterol    • Hypertension    • IBS (irritable bowel syndrome)    • Mitral valve prolapse    • Rheumatoid arthritis (CMS/HCC)    • Sleep apnea with use of continuous positive airway pressure (CPAP)    • Ventral hernia     sched repair   • Ventral hernia     SCHEDULED FOR REPAIR           Past Surgical History:   Procedure Laterality Date   • ANTERIOR CERVICAL FUSION  2011    C4C5   • CHOLECYSTECTOMY      LAP   • COLONOSCOPY N/A 2/8/2017    Procedure: COLONOSCOPY;  Surgeon: Domi Juarez MD;  Location: [facility] OR;  Service:    • ENDOSCOPY N/A 2/8/2017    Procedure: ESOPHAGOGASTRODUODENOSCOPY;  Surgeon: Domi Juarez MD;  Location: [facility] OR;  Service:    • HYSTERECTOMY      partial 1984, 2002 complete   • LAPAROSCOPIC LYSIS OF ADHESIONS  2002    and cyst removal   • OVARIAN CYST REMOVAL      laparoscopic x2 last 2006   • VENTRAL/INCISIONAL HERNIA REPAIR N/A 8/2/2018    Procedure: VENTRAL AND SPIGELIAN HERNIA REPAIR LAPAROSCOPIC;  Surgeon: Erin Fried DO;  Location: [facility] OR;  Service: General   • VENTRAL/INCISIONAL HERNIA REPAIR N/A 6/6/2019    Procedure: VENTRAL/INCISIONAL HERNIA REPAIR;  Surgeon: Erin Fried DO;   Location: [facility] OR;  Service: General           Physical Exam   Constitutional: She is oriented to person, place, and time. She appears well-developed and well-nourished.   HENT:   Head: Normocephalic and atraumatic.   Right Ear: External ear normal.   Left Ear: External ear normal.   Cardiovascular: Normal rate and regular rhythm.   Pulmonary/Chest: Effort normal and breath sounds normal.   Abdominal: Soft. Bowel sounds are normal.   Musculoskeletal: She exhibits no edema or deformity.   Neurological: She is alert and oriented to person, place, and time.   Skin: Skin is warm and dry.   Psychiatric: She has a normal mood and affect. Her behavior is normal.   Nursing note reviewed.      No debilities noted    Temp 99 °F (37.2 °C)          Diane was seen today for follow-up.    Diagnoses and all orders for this visit:    RUQ pain    History of gastritis    History of gastric polyp    We will get Diane scheduled for an EGD.  I discussed with the patient the benefits and risks of performing endoscopy.  Benefits and risks were not limited to but including bleeding, infection, perforation, complications of anesthesia, aspiration.  The patient appeared to understand and is willing to proceed.    Thank you for allowing me to participate in the care of this interesting patient.

## 2020-07-01 NOTE — H&P (VIEW-ONLY)
Diane Gerardo 64 y.o. female presents for  FU/DISCUSS CT.         HPI   Above noted and agree.  Diane's CT scan was negative for any hernias.  She is still having RUQ pain.  She says it is severe.  She had an EGD back in 2017 that showed gastritis and gastric polyps.  She does get bloating.  She has no chest pain or shortness of breath.  She does not smoke or use tobacco products.  She has no fevers or chills.  She has no other complaints.      Review of Systems   All other systems reviewed and are negative.          Past Medical History:   Diagnosis Date   • Anemia     history of   • Anxiety    • Colon polyp    • DDD (degenerative disc disease), lumbosacral    • Depression    • Diabetes mellitus (CMS/HCC)     BORDERLINE   • Disease of thyroid gland     hypothyroid   • Elevated cholesterol    • Hypertension    • IBS (irritable bowel syndrome)    • Mitral valve prolapse    • Rheumatoid arthritis (CMS/HCC)    • Sleep apnea with use of continuous positive airway pressure (CPAP)    • Ventral hernia     sched repair   • Ventral hernia     SCHEDULED FOR REPAIR           Past Surgical History:   Procedure Laterality Date   • ANTERIOR CERVICAL FUSION  2011    C4C5   • CHOLECYSTECTOMY      LAP   • COLONOSCOPY N/A 2/8/2017    Procedure: COLONOSCOPY;  Surgeon: Domi Juarez MD;  Location: [facility] OR;  Service:    • ENDOSCOPY N/A 2/8/2017    Procedure: ESOPHAGOGASTRODUODENOSCOPY;  Surgeon: Domi Juarez MD;  Location: [facility] OR;  Service:    • HYSTERECTOMY      partial 1984, 2002 complete   • LAPAROSCOPIC LYSIS OF ADHESIONS  2002    and cyst removal   • OVARIAN CYST REMOVAL      laparoscopic x2 last 2006   • VENTRAL/INCISIONAL HERNIA REPAIR N/A 8/2/2018    Procedure: VENTRAL AND SPIGELIAN HERNIA REPAIR LAPAROSCOPIC;  Surgeon: Erin Fried DO;  Location: [facility] OR;  Service: General   • VENTRAL/INCISIONAL HERNIA REPAIR N/A 6/6/2019    Procedure: VENTRAL/INCISIONAL HERNIA REPAIR;  Surgeon: Erin Fried DO;   Location: [facility] OR;  Service: General           Physical Exam   Constitutional: She is oriented to person, place, and time. She appears well-developed and well-nourished.   HENT:   Head: Normocephalic and atraumatic.   Right Ear: External ear normal.   Left Ear: External ear normal.   Cardiovascular: Normal rate and regular rhythm.   Pulmonary/Chest: Effort normal and breath sounds normal.   Abdominal: Soft. Bowel sounds are normal.   Musculoskeletal: She exhibits no edema or deformity.   Neurological: She is alert and oriented to person, place, and time.   Skin: Skin is warm and dry.   Psychiatric: She has a normal mood and affect. Her behavior is normal.   Nursing note reviewed.      No debilities noted    Temp 99 °F (37.2 °C)         Diane was seen today for follow-up.    Diagnoses and all orders for this visit:    RUQ pain    History of gastritis    History of gastric polyp    We will get Diane scheduled for an EGD.  I discussed with the patient the benefits and risks of performing endoscopy.  Benefits and risks were not limited to but including bleeding, infection, perforation, complications of anesthesia, aspiration.  The patient appeared to understand and is willing to proceed.    Thank you for allowing me to participate in the care of this interesting patient.

## 2020-07-11 ENCOUNTER — TRANSCRIBE ORDERS (OUTPATIENT)
Dept: ADMINISTRATIVE | Facility: HOSPITAL | Age: 65
End: 2020-07-11

## 2020-07-11 DIAGNOSIS — Z01.818 PREOP EXAMINATION: Primary | ICD-10-CM

## 2020-07-18 ENCOUNTER — LAB (OUTPATIENT)
Dept: LAB | Facility: HOSPITAL | Age: 65
End: 2020-07-18

## 2020-07-18 DIAGNOSIS — Z01.818 PREOP EXAMINATION: ICD-10-CM

## 2020-07-18 PROCEDURE — C9803 HOPD COVID-19 SPEC COLLECT: HCPCS

## 2020-07-18 PROCEDURE — U0004 COV-19 TEST NON-CDC HGH THRU: HCPCS

## 2020-07-18 PROCEDURE — U0002 COVID-19 LAB TEST NON-CDC: HCPCS

## 2020-07-20 ENCOUNTER — ANESTHESIA EVENT (OUTPATIENT)
Dept: PERIOP | Facility: HOSPITAL | Age: 65
End: 2020-07-20

## 2020-07-20 LAB
REF LAB TEST METHOD: NORMAL
SARS-COV-2 RNA RESP QL NAA+PROBE: NOT DETECTED

## 2020-07-21 ENCOUNTER — HOSPITAL ENCOUNTER (OUTPATIENT)
Facility: HOSPITAL | Age: 65
Setting detail: HOSPITAL OUTPATIENT SURGERY
Discharge: HOME | End: 2020-07-21
Attending: SURGERY | Admitting: SURGERY

## 2020-07-21 ENCOUNTER — ANESTHESIA (OUTPATIENT)
Dept: PERIOP | Facility: HOSPITAL | Age: 65
End: 2020-07-21

## 2020-07-21 VITALS
HEART RATE: 71 BPM | SYSTOLIC BLOOD PRESSURE: 126 MMHG | DIASTOLIC BLOOD PRESSURE: 63 MMHG | OXYGEN SATURATION: 99 % | RESPIRATION RATE: 16 BRPM | WEIGHT: 249.8 LBS | BODY MASS INDEX: 42.88 KG/M2 | TEMPERATURE: 98.2 F

## 2020-07-21 DIAGNOSIS — R10.11 RUQ PAIN: ICD-10-CM

## 2020-07-21 DIAGNOSIS — Z87.19 HISTORY OF GASTRIC POLYP: ICD-10-CM

## 2020-07-21 DIAGNOSIS — Z87.19 HISTORY OF GASTRITIS: ICD-10-CM

## 2020-07-21 LAB
GLUCOSE BLDC GLUCOMTR-MCNC: 150 MG/DL (ref 70–130)
POTASSIUM SERPL-SCNC: 3.8 MMOL/L (ref 3.5–5.2)

## 2020-07-21 PROCEDURE — 82962 GLUCOSE BLOOD TEST: CPT

## 2020-07-21 PROCEDURE — 25010000002 PROPOFOL 10 MG/ML EMULSION: Performed by: NURSE ANESTHETIST, CERTIFIED REGISTERED

## 2020-07-21 PROCEDURE — 87081 CULTURE SCREEN ONLY: CPT | Performed by: SURGERY

## 2020-07-21 PROCEDURE — 43239 EGD BIOPSY SINGLE/MULTIPLE: CPT | Performed by: SURGERY

## 2020-07-21 PROCEDURE — 84132 ASSAY OF SERUM POTASSIUM: CPT | Performed by: NURSE ANESTHETIST, CERTIFIED REGISTERED

## 2020-07-21 RX ORDER — SODIUM CHLORIDE 0.9 % (FLUSH) 0.9 %
10 SYRINGE (ML) INJECTION EVERY 12 HOURS SCHEDULED
Status: DISCONTINUED | OUTPATIENT
Start: 2020-07-21 | End: 2020-07-21 | Stop reason: HOSPADM

## 2020-07-21 RX ORDER — PROPOFOL 10 MG/ML
VIAL (ML) INTRAVENOUS AS NEEDED
Status: DISCONTINUED | OUTPATIENT
Start: 2020-07-21 | End: 2020-07-21 | Stop reason: SURG

## 2020-07-21 RX ORDER — ONDANSETRON 2 MG/ML
4 INJECTION INTRAMUSCULAR; INTRAVENOUS ONCE AS NEEDED
Status: DISCONTINUED | OUTPATIENT
Start: 2020-07-21 | End: 2020-07-21 | Stop reason: HOSPADM

## 2020-07-21 RX ORDER — MAGNESIUM HYDROXIDE 1200 MG/15ML
LIQUID ORAL AS NEEDED
Status: DISCONTINUED | OUTPATIENT
Start: 2020-07-21 | End: 2020-07-21 | Stop reason: HOSPADM

## 2020-07-21 RX ORDER — DEXMEDETOMIDINE HYDROCHLORIDE 100 UG/ML
INJECTION, SOLUTION INTRAVENOUS AS NEEDED
Status: DISCONTINUED | OUTPATIENT
Start: 2020-07-21 | End: 2020-07-21 | Stop reason: SURG

## 2020-07-21 RX ORDER — LIDOCAINE HYDROCHLORIDE 20 MG/ML
INJECTION, SOLUTION INFILTRATION; PERINEURAL AS NEEDED
Status: DISCONTINUED | OUTPATIENT
Start: 2020-07-21 | End: 2020-07-21 | Stop reason: SURG

## 2020-07-21 RX ORDER — SODIUM CHLORIDE, SODIUM LACTATE, POTASSIUM CHLORIDE, CALCIUM CHLORIDE 600; 310; 30; 20 MG/100ML; MG/100ML; MG/100ML; MG/100ML
9 INJECTION, SOLUTION INTRAVENOUS CONTINUOUS
Status: DISCONTINUED | OUTPATIENT
Start: 2020-07-21 | End: 2020-07-21 | Stop reason: HOSPADM

## 2020-07-21 RX ORDER — SODIUM CHLORIDE, SODIUM LACTATE, POTASSIUM CHLORIDE, CALCIUM CHLORIDE 600; 310; 30; 20 MG/100ML; MG/100ML; MG/100ML; MG/100ML
100 INJECTION, SOLUTION INTRAVENOUS CONTINUOUS
Status: DISCONTINUED | OUTPATIENT
Start: 2020-07-21 | End: 2020-07-21 | Stop reason: HOSPADM

## 2020-07-21 RX ORDER — SODIUM CHLORIDE 0.9 % (FLUSH) 0.9 %
10 SYRINGE (ML) INJECTION AS NEEDED
Status: DISCONTINUED | OUTPATIENT
Start: 2020-07-21 | End: 2020-07-21 | Stop reason: HOSPADM

## 2020-07-21 RX ORDER — SODIUM CHLORIDE 9 MG/ML
40 INJECTION, SOLUTION INTRAVENOUS AS NEEDED
Status: DISCONTINUED | OUTPATIENT
Start: 2020-07-21 | End: 2020-07-21 | Stop reason: HOSPADM

## 2020-07-21 RX ORDER — GLYCOPYRROLATE 0.2 MG/ML
INJECTION INTRAMUSCULAR; INTRAVENOUS AS NEEDED
Status: DISCONTINUED | OUTPATIENT
Start: 2020-07-21 | End: 2020-07-21 | Stop reason: SURG

## 2020-07-21 RX ORDER — LIDOCAINE HYDROCHLORIDE 10 MG/ML
0.5 INJECTION, SOLUTION EPIDURAL; INFILTRATION; INTRACAUDAL; PERINEURAL ONCE AS NEEDED
Status: DISCONTINUED | OUTPATIENT
Start: 2020-07-21 | End: 2020-07-21 | Stop reason: HOSPADM

## 2020-07-21 RX ADMIN — PROPOFOL 50 MG: 10 INJECTION, EMULSION INTRAVENOUS at 10:49

## 2020-07-21 RX ADMIN — GLYCOPYRROLATE 0.2 MG: 0.2 INJECTION INTRAMUSCULAR; INTRAVENOUS at 10:35

## 2020-07-21 RX ADMIN — SODIUM CHLORIDE, POTASSIUM CHLORIDE, SODIUM LACTATE AND CALCIUM CHLORIDE 9 ML/HR: 600; 310; 30; 20 INJECTION, SOLUTION INTRAVENOUS at 10:06

## 2020-07-21 RX ADMIN — PROPOFOL 75 MG: 10 INJECTION, EMULSION INTRAVENOUS at 10:43

## 2020-07-21 RX ADMIN — LIDOCAINE HYDROCHLORIDE 100 MG: 20 INJECTION, SOLUTION INFILTRATION; PERINEURAL at 10:41

## 2020-07-21 RX ADMIN — PROPOFOL 25 MG: 10 INJECTION, EMULSION INTRAVENOUS at 10:46

## 2020-07-21 RX ADMIN — DEXMEDETOMIDINE HYDROCHLORIDE 20 MCG: 100 INJECTION, SOLUTION, CONCENTRATE INTRAVENOUS at 10:37

## 2020-07-21 NOTE — ANESTHESIA POSTPROCEDURE EVALUATION
Patient: Diane Gerardo    Procedure Summary     Date:  07/21/20 Room / Location:  [facility] ENDOSCOPY 1 /  [facility] OR    Anesthesia Start:  1033 Anesthesia Stop:  1052    Procedure:  ESOPHAGOGASTRODUODENOSCOPY with nirmal test (N/A Esophagus) Diagnosis:       RUQ pain      History of gastritis      History of gastric polyp      (RUQ pain [R10.11])      (History of gastritis [Z87.19])      (History of gastric polyp [Z87.19])    Surgeon:  Erin Fried DO Provider:  Feli Dumont CRNA    Anesthesia Type:  MAC ASA Status:  3          Anesthesia Type: MAC    Vitals  Vitals Value Taken Time   /59 7/21/2020 10:56 AM   Temp     Pulse 86 7/21/2020 10:56 AM   Resp 18 7/21/2020 10:56 AM   SpO2 97 % 7/21/2020 10:56 AM           Post Anesthesia Care and Evaluation    Patient location during evaluation: bedside  Patient participation: complete - patient participated  Level of consciousness: awake  Pain score: 0  Pain management: adequate  Airway patency: patent  Anesthetic complications: No anesthetic complications  PONV Status: none  Cardiovascular status: acceptable  Respiratory status: acceptable  Hydration status: acceptable

## 2020-07-21 NOTE — ANESTHESIA PREPROCEDURE EVALUATION
Anesthesia Evaluation     Patient summary reviewed and Nursing notes reviewed   no history of anesthetic complications:  NPO Solid Status: > 8 hours  NPO Liquid Status: > 8 hours           Airway   Mallampati: II  TM distance: >3 FB  Neck ROM: full  No difficulty expected  Dental      Comment: Permanent bridge    Pulmonary - normal exam    breath sounds clear to auscultation  (+) a smoker Former, sleep apnea on CPAP,   (-) shortness of breath  Cardiovascular - normal exam  Exercise tolerance: poor (<4 METS)    ECG reviewed  Rhythm: regular  Rate: normal    (+) hypertension, valvular problems/murmurs MVP, hyperlipidemia,     ROS comment: EKG 11/12/19:  Comparison: not compared with previous ECG   Previous ECG: no previous ECG available  Rhythm: sinus rhythm    ECHO 10/23/19:  Interpretation Summary     ·Left atrial cavity size is moderately dilated.  ·There is calcification of the aortic valve.  ·Mild mitral valve regurgitation is present  ·Mild tricuspid valve regurgitation is present.  ·Calculated EF = 64.0%  ·There is no evidence of pericardial effusion.    STRESS TEST 12/6/19:  Interpretation Summary   ·Findings consistent with a normal ECG stress test.  ·Left ventricular ejection fraction is normal (Calculated EF = 60%).  ·Myocardial perfusion imaging indicates a small-sized, mildly severe area of ischemia located in the lateral wall and septal wall.  ·Impressions are consistent with an intermediate risk study.    Neuro/Psych  (-) psychiatric history  GI/Hepatic/Renal/Endo    (+) morbid obesity,  liver disease fatty liver disease, diabetes mellitus type 2, thyroid problem hypothyroidism    Musculoskeletal     Abdominal   (+) obese,    Substance History - negative use     OB/GYN negative ob/gyn ROS         Other   arthritis,                      Anesthesia Plan    ASA 3     MAC     intravenous induction     Anesthetic plan, all risks, benefits, and alternatives have been provided, discussed and informed consent  has been obtained with: patient.  Use of blood products discussed with patient  Consented to blood products.

## 2020-07-21 NOTE — INTERVAL H&P NOTE
H&P reviewed. The patient was examined and there are no changes to the H&P.       /59   Pulse 77   Temp 98.4 °F (36.9 °C)   Resp 15   Wt 113 kg (249 lb 12.8 oz)   SpO2 98%   BMI 42.88 kg/m²

## 2020-07-21 NOTE — OP NOTE
EGD Procedure Note    Pre-operative Diagnosis:  RUQ pain [R10.11]  History of gastritis [Z87.19]  History of gastric polyp [Z87.19]    Post-operative Diagnosis: Normal EGD    Procedure:  EGD with biopsy for H. pylori cold forceps    Surgeon: Fariha    Estimated Blood Loss: Minimal    Complications: None    Anesthetic: MAC     Indications: Right upper quadrant pain    Findings/Treatments: There are no abnormal findings but due to her pain we did a biopsy for H. pylori with cold forceps    Recommendations:  -Await JOHNY test result and treat for Helicobacter pylori if positive.      Technique:    After discussing the benefits and risks of an EGD, benefits and risks not limited to but including:  Bleeding, infection, perforation, aspiration; informed consent was signed.  The patient was taken into the endoscopy suite at [facility] and placed in the left lateral decubitus position.  MAC anesthesia was induced under appropriate monitoring.  Bite block was placed and the gastroscope was inserted thru such and advanced under direct vision to second portion of the duodenum.  A careful inspection was made as the gastroscope was withdrawn, including a retroflexed view of the proximal stomach; findings and interventions are described below.  If biopsies were taken, this was done with the cold biopsy forceps.    Erin Fried D.O.

## 2020-07-22 LAB — UREASE TISS QL: NEGATIVE

## 2020-07-29 ENCOUNTER — OFFICE VISIT (OUTPATIENT)
Dept: SURGERY | Facility: CLINIC | Age: 65
End: 2020-07-29

## 2020-07-29 VITALS — TEMPERATURE: 98 F

## 2020-07-29 DIAGNOSIS — E66.01 MORBIDLY OBESE (HCC): Primary | ICD-10-CM

## 2020-07-29 PROCEDURE — 99213 OFFICE O/P EST LOW 20 MIN: CPT | Performed by: SURGERY

## 2020-07-29 NOTE — PROGRESS NOTES
Diane Gerardo 64 y.o. female presents for PO FU EGD.  Pt states she is still having pain.  PCP Dr. Davis.      HPI   Above-noted agree.  Diane's EGD was completely normal.  Her CT scan showed no hernia recurrence.  She still has pain in her right upper quadrant area.  She also has a lot of joint pains.  She attributes much of her pain to rheumatoid arthritis.  She does consume a lot of sugar.  She has no fevers or chills.  She has no chest pain or shortness of breath.  She spends a lot of time in the pool.  She is actually lost about 10 pounds since June.  She attributes this to increase moving in the pool.      Review of Systems        Past Medical History:   Diagnosis Date   • Anemia     history of   • Anxiety    • Colon polyp    • DDD (degenerative disc disease), lumbosacral    • Depression    • Diabetes mellitus (CMS/HCC)     BORDERLINE   • Disease of thyroid gland     hypothyroid   • Elevated cholesterol    • Hypertension    • IBS (irritable bowel syndrome)    • Mitral valve prolapse    • Rheumatoid arthritis (CMS/HCC)    • Sleep apnea with use of continuous positive airway pressure (CPAP)    • Ventral hernia     sched repair   • Ventral hernia     SCHEDULED FOR REPAIR           Past Surgical History:   Procedure Laterality Date   • ANTERIOR CERVICAL FUSION  2011    C4C5   • CHOLECYSTECTOMY      LAP   • COLONOSCOPY N/A 2/8/2017    Procedure: COLONOSCOPY;  Surgeon: Domi Juarez MD;  Location: [facility] OR;  Service:    • ENDOSCOPY N/A 2/8/2017    Procedure: ESOPHAGOGASTRODUODENOSCOPY;  Surgeon: Domi Juarez MD;  Location: [facility] OR;  Service:    • ENDOSCOPY N/A 7/21/2020    Procedure: ESOPHAGOGASTRODUODENOSCOPY with nirmal test;  Surgeon: Erin Fried DO;  Location: [facility] OR;  Service: Gastroenterology;  Laterality: N/A;  normal exam   • HYSTERECTOMY      partial 1984, 2002 complete   • LAPAROSCOPIC LYSIS OF ADHESIONS  2002    and cyst removal   • OVARIAN CYST REMOVAL      laparoscopic x2 last 2006   •  VENTRAL/INCISIONAL HERNIA REPAIR N/A 8/2/2018    Procedure: VENTRAL AND SPIGELIAN HERNIA REPAIR LAPAROSCOPIC;  Surgeon: Erin Fried DO;  Location:  [facility] OR;  Service: General   • VENTRAL/INCISIONAL HERNIA REPAIR N/A 6/6/2019    Procedure: VENTRAL/INCISIONAL HERNIA REPAIR;  Surgeon: Erin Fried DO;  Location:  [facility] OR;  Service: General           Physical Exam   Constitutional: She is oriented to person, place, and time. She appears well-developed and well-nourished.   Musculoskeletal: She exhibits no edema or deformity.   Neurological: She is alert and oriented to person, place, and time.   Skin: Skin is warm and dry.   Psychiatric: She has a normal mood and affect. Her behavior is normal.           Temp 98 °F (36.7 °C)         Diane was seen today for follow-up.    Diagnoses and all orders for this visit:    Morbidly obese (CMS/HCC)    We discussed some dietary changes.  We discussed the possibility of referral for bariatric surgery but she does not want that at this time.  I made some educational recommendations for her.  If she should need any assistance she may call and return anytime as needed.    Thank you for allowing me to participate in the care of this interesting patient.

## 2021-10-18 ENCOUNTER — TRANSCRIBE ORDERS (OUTPATIENT)
Dept: ADMINISTRATIVE | Facility: HOSPITAL | Age: 66
End: 2021-10-18

## 2021-10-18 DIAGNOSIS — Z12.31 SCREENING MAMMOGRAM, ENCOUNTER FOR: Primary | ICD-10-CM

## 2021-11-11 ENCOUNTER — HOSPITAL ENCOUNTER (OUTPATIENT)
Dept: MAMMOGRAPHY | Facility: HOSPITAL | Age: 66
Discharge: HOME | End: 2021-11-11
Admitting: FAMILY MEDICINE

## 2021-11-11 DIAGNOSIS — Z12.31 SCREENING MAMMOGRAM, ENCOUNTER FOR: ICD-10-CM

## 2021-11-11 PROCEDURE — 77067 SCR MAMMO BI INCL CAD: CPT

## 2021-11-11 PROCEDURE — 77063 BREAST TOMOSYNTHESIS BI: CPT

## 2022-04-18 ENCOUNTER — OFFICE VISIT (OUTPATIENT)
Dept: SURGERY | Facility: CLINIC | Age: 67
End: 2022-04-18

## 2022-04-18 VITALS
DIASTOLIC BLOOD PRESSURE: 74 MMHG | WEIGHT: 242 LBS | SYSTOLIC BLOOD PRESSURE: 132 MMHG | HEIGHT: 64 IN | RESPIRATION RATE: 16 BRPM | HEART RATE: 60 BPM | BODY MASS INDEX: 41.32 KG/M2

## 2022-04-18 DIAGNOSIS — E66.01 MORBID OBESITY WITH BMI OF 40.0-44.9, ADULT: Primary | ICD-10-CM

## 2022-04-18 DIAGNOSIS — Z86.010 HISTORY OF COLONIC POLYPS: ICD-10-CM

## 2022-04-18 DIAGNOSIS — R10.11 RUQ PAIN: Primary | ICD-10-CM

## 2022-04-18 DIAGNOSIS — R10.11 RUQ PAIN: ICD-10-CM

## 2022-04-18 PROCEDURE — 99214 OFFICE O/P EST MOD 30 MIN: CPT | Performed by: SURGERY

## 2022-04-18 NOTE — H&P
Diane Gerardo 66 y.o. female presents @ the req of Dr. Davis for eval of mass RUQ abd X 1 mo.   Chief Complaint   Patient presents with   • Mass     RUQ             HPI   Above-noted and agree.  Diane is known to my service.  I performed a laparoscopic spigelian hernia repair on her and an incisional hernia repair on her.  She presents today with concerns of a mass on her right upper quadrant.  She can feel it when she stands up.  She had a colonoscopy in 2017 where polyps were removed.  She alternates with constipation and diarrhea.  She tolerates a regular diet without nausea or vomiting.  She has no chest pain.  She has no shortness of breath.  She does not smoke or use tobacco products.      Review of Systems   Constitutional: Negative for fever.   Gastrointestinal: Positive for abdominal pain and diarrhea. Negative for constipation, nausea and vomiting.   Genitourinary: Negative for dysuria, frequency and hematuria.   Musculoskeletal: Negative for arthralgias and myalgias.   Neurological: Negative for headaches.   All other systems reviewed and are negative.            Current Outpatient Medications:   •  Cholecalciferol (Vitamin D3) 25 MCG (1000 UT) capsule, Take  by mouth., Disp: , Rfl:   •  hydrochlorothiazide (HYDRODIURIL) 25 MG tablet, Take 25 mg by mouth Every Morning., Disp: , Rfl:   •  levothyroxine (SYNTHROID, LEVOTHROID) 150 MCG tablet, Take 150 mcg by mouth Every Morning., Disp: , Rfl:   •  lisinopril (PRINIVIL,ZESTRIL) 10 MG tablet, Take 10 mg by mouth Daily., Disp: , Rfl: 3  •  metFORMIN (GLUCOPHAGE) 1000 MG tablet, Take 1,000 mg by mouth Every Evening., Disp: , Rfl:   •  Multiple Vitamins-Minerals (MULTI COMPLETE PO), Take  by mouth., Disp: , Rfl:         Allergies   Allergen Reactions   • Aspirin Hives   • Penicillins Hives and Swelling     Felt like throat was swelling  Hives   • Sulfa Antibiotics Hives     Hives, swelling.   • Nsaids Hives and Swelling           Past Medical History:    Diagnosis Date   • Anemia     history of   • Anxiety    • Colon polyp    • DDD (degenerative disc disease), lumbosacral    • Depression    • Diabetes mellitus (CMS/HCC)     BORDERLINE   • Disease of thyroid gland     hypothyroid   • Elevated cholesterol    • Hypertension    • IBS (irritable bowel syndrome)    • Mitral valve prolapse    • Rheumatoid arthritis (CMS/HCC)    • Sleep apnea with use of continuous positive airway pressure (CPAP)    • Ventral hernia     sched repair   • Ventral hernia     SCHEDULED FOR REPAIR           Past Surgical History:   Procedure Laterality Date   • ANTERIOR CERVICAL FUSION      C4C5   • CHOLECYSTECTOMY      LAP   • COLONOSCOPY N/A 2017    Procedure: COLONOSCOPY;  Surgeon: Domi Juarez MD;  Location:  [facility] OR;  Service:    • ENDOSCOPY N/A 2017    Procedure: ESOPHAGOGASTRODUODENOSCOPY;  Surgeon: Domi Juarez MD;  Location: [facility] OR;  Service:    • ENDOSCOPY N/A 2020    Procedure: ESOPHAGOGASTRODUODENOSCOPY with nirmal test;  Surgeon: Erin Fried DO;  Location: [facility] OR;  Service: Gastroenterology;  Laterality: N/A;  normal exam   • HYSTERECTOMY      partial ,  complete   • LAPAROSCOPIC LYSIS OF ADHESIONS      and cyst removal   • OVARIAN CYST REMOVAL      laparoscopic x2 last    • VENTRAL/INCISIONAL HERNIA REPAIR N/A 2018    Procedure: VENTRAL AND SPIGELIAN HERNIA REPAIR LAPAROSCOPIC;  Surgeon: Erin Fried DO;  Location: [facility] OR;  Service: General   • VENTRAL/INCISIONAL HERNIA REPAIR N/A 2019    Procedure: VENTRAL/INCISIONAL HERNIA REPAIR;  Surgeon: Erin Fried DO;  Location: [facility] OR;  Service: General           Social History     Tobacco Use   • Smoking status: Former Smoker     Packs/day: 1.00     Years: 10.00     Pack years: 10.00     Types: Cigarettes     Quit date:      Years since quittin.3   • Smokeless tobacco: Never Used   Substance Use Topics   • Alcohol use: Yes     Comment: occasional  "  • Drug use: No             There is no immunization history on file for this patient.        Physical Exam  Vitals and nursing note reviewed.   Constitutional:       Appearance: Normal appearance.   HENT:      Head: Normocephalic and atraumatic.   Cardiovascular:      Rate and Rhythm: Normal rate and regular rhythm.   Pulmonary:      Effort: Pulmonary effort is normal.      Breath sounds: Normal breath sounds.   Abdominal:      General: Bowel sounds are normal.      Palpations: Abdomen is soft.   Musculoskeletal:         General: No swelling or tenderness.   Skin:     General: Skin is warm and dry.   Neurological:      General: No focal deficit present.      Mental Status: She is alert and oriented to person, place, and time.   Psychiatric:         Mood and Affect: Mood normal.         Behavior: Behavior normal.         Debilities/Disabilities Identified: None    Emotional Behavior: Appropriate      /74   Pulse 60   Resp 16   Ht 162.6 cm (64\")   Wt 110 kg (242 lb)   BMI 41.54 kg/m²         Diagnoses and all orders for this visit:    1. Morbid obesity with BMI of 40.0-44.9, adult (HCC) (Primary)    2. History of colonic polyps    3. RUQ pain    I am unable to palpate a mass so we will schedule a CT scan.  We will also get Diane scheduled for a repeat colonoscopy.  I discussed with the patient the benefits and risks of performing endoscopy.  Benefits and risks were not limited to but including bleeding, infection, perforation, complications of anesthesia, aspiration.  The patient appeared to understand and is willing to proceed.    Thank you for allowing me to participate in the care of this interesting patient.        Answers for HPI/ROS submitted by the patient on 4/15/2022  What is the primary reason for your visit?: Abdominal Pain          "

## 2022-04-18 NOTE — PROGRESS NOTES
Diane Gerardo 66 y.o. female presents @ the req of Dr. Davis for eval of mass RUQ abd X 1 mo.   Chief Complaint   Patient presents with   • Mass     RUQ             HPI   Above-noted and agree.  Diane is known to my service.  I performed a laparoscopic spigelian hernia repair on her and an incisional hernia repair on her.  She presents today with concerns of a mass on her right upper quadrant.  She can feel it when she stands up.  She had a colonoscopy in 2017 where polyps were removed.  She alternates with constipation and diarrhea.  She tolerates a regular diet without nausea or vomiting.  She has no chest pain.  She has no shortness of breath.  She does not smoke or use tobacco products.      Review of Systems   Constitutional: Negative for fever.   Gastrointestinal: Positive for abdominal pain and diarrhea. Negative for constipation, nausea and vomiting.   Genitourinary: Negative for dysuria, frequency and hematuria.   Musculoskeletal: Negative for arthralgias and myalgias.   Neurological: Negative for headaches.   All other systems reviewed and are negative.            Current Outpatient Medications:   •  Cholecalciferol (Vitamin D3) 25 MCG (1000 UT) capsule, Take  by mouth., Disp: , Rfl:   •  hydrochlorothiazide (HYDRODIURIL) 25 MG tablet, Take 25 mg by mouth Every Morning., Disp: , Rfl:   •  levothyroxine (SYNTHROID, LEVOTHROID) 150 MCG tablet, Take 150 mcg by mouth Every Morning., Disp: , Rfl:   •  lisinopril (PRINIVIL,ZESTRIL) 10 MG tablet, Take 10 mg by mouth Daily., Disp: , Rfl: 3  •  metFORMIN (GLUCOPHAGE) 1000 MG tablet, Take 1,000 mg by mouth Every Evening., Disp: , Rfl:   •  Multiple Vitamins-Minerals (MULTI COMPLETE PO), Take  by mouth., Disp: , Rfl:         Allergies   Allergen Reactions   • Aspirin Hives   • Penicillins Hives and Swelling     Felt like throat was swelling  Hives   • Sulfa Antibiotics Hives     Hives, swelling.   • Nsaids Hives and Swelling           Past Medical History:    Diagnosis Date   • Anemia     history of   • Anxiety    • Colon polyp    • DDD (degenerative disc disease), lumbosacral    • Depression    • Diabetes mellitus (CMS/HCC)     BORDERLINE   • Disease of thyroid gland     hypothyroid   • Elevated cholesterol    • Hypertension    • IBS (irritable bowel syndrome)    • Mitral valve prolapse    • Rheumatoid arthritis (CMS/HCC)    • Sleep apnea with use of continuous positive airway pressure (CPAP)    • Ventral hernia     sched repair   • Ventral hernia     SCHEDULED FOR REPAIR           Past Surgical History:   Procedure Laterality Date   • ANTERIOR CERVICAL FUSION      C4C5   • CHOLECYSTECTOMY      LAP   • COLONOSCOPY N/A 2017    Procedure: COLONOSCOPY;  Surgeon: Domi Juarez MD;  Location:  [facility] OR;  Service:    • ENDOSCOPY N/A 2017    Procedure: ESOPHAGOGASTRODUODENOSCOPY;  Surgeon: Domi Juarez MD;  Location: [facility] OR;  Service:    • ENDOSCOPY N/A 2020    Procedure: ESOPHAGOGASTRODUODENOSCOPY with nirmal test;  Surgeon: Erin Fried DO;  Location: [facility] OR;  Service: Gastroenterology;  Laterality: N/A;  normal exam   • HYSTERECTOMY      partial ,  complete   • LAPAROSCOPIC LYSIS OF ADHESIONS      and cyst removal   • OVARIAN CYST REMOVAL      laparoscopic x2 last    • VENTRAL/INCISIONAL HERNIA REPAIR N/A 2018    Procedure: VENTRAL AND SPIGELIAN HERNIA REPAIR LAPAROSCOPIC;  Surgeon: Erin Fried DO;  Location: [facility] OR;  Service: General   • VENTRAL/INCISIONAL HERNIA REPAIR N/A 2019    Procedure: VENTRAL/INCISIONAL HERNIA REPAIR;  Surgeon: Erin Fried DO;  Location: [facility] OR;  Service: General           Social History     Tobacco Use   • Smoking status: Former Smoker     Packs/day: 1.00     Years: 10.00     Pack years: 10.00     Types: Cigarettes     Quit date:      Years since quittin.3   • Smokeless tobacco: Never Used   Substance Use Topics   • Alcohol use: Yes     Comment: occasional  "  • Drug use: No             There is no immunization history on file for this patient.        Physical Exam  Vitals and nursing note reviewed.   Constitutional:       Appearance: Normal appearance.   HENT:      Head: Normocephalic and atraumatic.   Cardiovascular:      Rate and Rhythm: Normal rate and regular rhythm.   Pulmonary:      Effort: Pulmonary effort is normal.      Breath sounds: Normal breath sounds.   Abdominal:      General: Bowel sounds are normal.      Palpations: Abdomen is soft.   Musculoskeletal:         General: No swelling or tenderness.   Skin:     General: Skin is warm and dry.   Neurological:      General: No focal deficit present.      Mental Status: She is alert and oriented to person, place, and time.   Psychiatric:         Mood and Affect: Mood normal.         Behavior: Behavior normal.         Debilities/Disabilities Identified: None    Emotional Behavior: Appropriate      /74   Pulse 60   Resp 16   Ht 162.6 cm (64\")   Wt 110 kg (242 lb)   BMI 41.54 kg/m²         Diagnoses and all orders for this visit:    1. Morbid obesity with BMI of 40.0-44.9, adult (HCC) (Primary)    2. History of colonic polyps    3. RUQ pain    I am unable to palpate a mass so we will schedule a CT scan.  We will also get Diane scheduled for a repeat colonoscopy.  I discussed with the patient the benefits and risks of performing endoscopy.  Benefits and risks were not limited to but including bleeding, infection, perforation, complications of anesthesia, aspiration.  The patient appeared to understand and is willing to proceed.    Thank you for allowing me to participate in the care of this interesting patient.        Answers for HPI/ROS submitted by the patient on 4/15/2022  What is the primary reason for your visit?: Abdominal Pain      "

## 2022-04-25 ENCOUNTER — ANESTHESIA EVENT (OUTPATIENT)
Dept: PERIOP | Facility: HOSPITAL | Age: 67
End: 2022-04-25

## 2022-04-26 ENCOUNTER — ANESTHESIA (OUTPATIENT)
Dept: PERIOP | Facility: HOSPITAL | Age: 67
End: 2022-04-26

## 2022-04-26 ENCOUNTER — HOSPITAL ENCOUNTER (OUTPATIENT)
Facility: HOSPITAL | Age: 67
Setting detail: HOSPITAL OUTPATIENT SURGERY
Discharge: HOME | End: 2022-04-26
Attending: SURGERY | Admitting: SURGERY

## 2022-04-26 ENCOUNTER — HOSPITAL ENCOUNTER (OUTPATIENT)
Dept: CT IMAGING | Facility: HOSPITAL | Age: 67
Discharge: HOME | End: 2022-04-26

## 2022-04-26 VITALS
RESPIRATION RATE: 17 BRPM | TEMPERATURE: 98.2 F | HEART RATE: 60 BPM | HEIGHT: 64 IN | BODY MASS INDEX: 40.36 KG/M2 | DIASTOLIC BLOOD PRESSURE: 62 MMHG | WEIGHT: 236.4 LBS | OXYGEN SATURATION: 97 % | SYSTOLIC BLOOD PRESSURE: 130 MMHG

## 2022-04-26 DIAGNOSIS — R10.11 RUQ PAIN: ICD-10-CM

## 2022-04-26 DIAGNOSIS — Z86.010 HISTORY OF COLONIC POLYPS: ICD-10-CM

## 2022-04-26 LAB — GLUCOSE BLDC GLUCOMTR-MCNC: 129 MG/DL (ref 70–130)

## 2022-04-26 PROCEDURE — 45380 COLONOSCOPY AND BIOPSY: CPT | Performed by: SURGERY

## 2022-04-26 PROCEDURE — 74177 CT ABD & PELVIS W/CONTRAST: CPT

## 2022-04-26 PROCEDURE — 88305 TISSUE EXAM BY PATHOLOGIST: CPT | Performed by: SURGERY

## 2022-04-26 PROCEDURE — 82962 GLUCOSE BLOOD TEST: CPT

## 2022-04-26 PROCEDURE — 0 DIATRIZOATE MEGLUMINE & SODIUM PER 1 ML: Performed by: SURGERY

## 2022-04-26 PROCEDURE — 25010000002 PROPOFOL 10 MG/ML EMULSION: Performed by: REGISTERED NURSE

## 2022-04-26 PROCEDURE — 0 IOPAMIDOL PER 1 ML: Performed by: SURGERY

## 2022-04-26 RX ORDER — SODIUM CHLORIDE 0.9 % (FLUSH) 0.9 %
10 SYRINGE (ML) INJECTION EVERY 12 HOURS SCHEDULED
Status: DISCONTINUED | OUTPATIENT
Start: 2022-04-26 | End: 2022-04-26 | Stop reason: HOSPADM

## 2022-04-26 RX ORDER — SODIUM CHLORIDE 0.9 % (FLUSH) 0.9 %
10 SYRINGE (ML) INJECTION AS NEEDED
Status: DISCONTINUED | OUTPATIENT
Start: 2022-04-26 | End: 2022-04-26 | Stop reason: HOSPADM

## 2022-04-26 RX ORDER — LIDOCAINE HYDROCHLORIDE 10 MG/ML
0.5 INJECTION, SOLUTION EPIDURAL; INFILTRATION; INTRACAUDAL; PERINEURAL ONCE AS NEEDED
Status: DISCONTINUED | OUTPATIENT
Start: 2022-04-26 | End: 2022-04-26 | Stop reason: HOSPADM

## 2022-04-26 RX ORDER — LIDOCAINE HYDROCHLORIDE 20 MG/ML
INJECTION, SOLUTION INFILTRATION; PERINEURAL AS NEEDED
Status: DISCONTINUED | OUTPATIENT
Start: 2022-04-26 | End: 2022-04-26 | Stop reason: SURG

## 2022-04-26 RX ORDER — PROPOFOL 10 MG/ML
VIAL (ML) INTRAVENOUS AS NEEDED
Status: DISCONTINUED | OUTPATIENT
Start: 2022-04-26 | End: 2022-04-26 | Stop reason: SURG

## 2022-04-26 RX ORDER — SODIUM CHLORIDE 9 MG/ML
40 INJECTION, SOLUTION INTRAVENOUS AS NEEDED
Status: DISCONTINUED | OUTPATIENT
Start: 2022-04-26 | End: 2022-04-26 | Stop reason: HOSPADM

## 2022-04-26 RX ORDER — SODIUM CHLORIDE, SODIUM LACTATE, POTASSIUM CHLORIDE, CALCIUM CHLORIDE 600; 310; 30; 20 MG/100ML; MG/100ML; MG/100ML; MG/100ML
9 INJECTION, SOLUTION INTRAVENOUS CONTINUOUS PRN
Status: DISCONTINUED | OUTPATIENT
Start: 2022-04-26 | End: 2022-04-26 | Stop reason: HOSPADM

## 2022-04-26 RX ORDER — MAGNESIUM HYDROXIDE 1200 MG/15ML
LIQUID ORAL AS NEEDED
Status: DISCONTINUED | OUTPATIENT
Start: 2022-04-26 | End: 2022-04-26 | Stop reason: HOSPADM

## 2022-04-26 RX ADMIN — DIATRIZOATE MEGLUMINE AND DIATRIZOATE SODIUM 30 ML: 600; 100 SOLUTION ORAL; RECTAL at 11:30

## 2022-04-26 RX ADMIN — LIDOCAINE HYDROCHLORIDE 50 MG: 20 INJECTION, SOLUTION INFILTRATION; PERINEURAL at 09:49

## 2022-04-26 RX ADMIN — PROPOFOL 150 MG: 10 INJECTION, EMULSION INTRAVENOUS at 09:49

## 2022-04-26 RX ADMIN — IOPAMIDOL 100 ML: 755 INJECTION, SOLUTION INTRAVENOUS at 13:00

## 2022-04-26 RX ADMIN — SODIUM CHLORIDE, POTASSIUM CHLORIDE, SODIUM LACTATE AND CALCIUM CHLORIDE 9 ML/HR: 600; 310; 30; 20 INJECTION, SOLUTION INTRAVENOUS at 09:26

## 2022-04-26 RX ADMIN — PROPOFOL 75 MCG/KG/MIN: 10 INJECTION, EMULSION INTRAVENOUS at 09:50

## 2022-04-26 NOTE — ANESTHESIA PREPROCEDURE EVALUATION
Anesthesia Evaluation     Patient summary reviewed and Nursing notes reviewed   no history of anesthetic complications:  NPO Solid Status: > 8 hours  NPO Liquid Status: > 8 hours           Airway   Mallampati: II  TM distance: >3 FB  Neck ROM: full  No difficulty expected  Dental      Comment: Permanent bridge upper    Pulmonary - normal exam    breath sounds clear to auscultation  (+) a smoker (quit 2008) Former, sleep apnea (does not use machine),   Cardiovascular - normal exam  Exercise tolerance: poor (<4 METS) (Due to rheumatoid arthritis)    Rhythm: regular  Rate: normal    (+) hypertension well controlled 2 medications or greater, valvular problems/murmurs MVP, hyperlipidemia,       Neuro/Psych  (+) dizziness/light headedness (occ light headedness), numbness (hands),    GI/Hepatic/Renal/Endo    (+) morbid obesity,  liver disease fatty liver disease, diabetes mellitus type 2 well controlled, thyroid problem hypothyroidism    Musculoskeletal     Neck pain: history of, C4-5 fusion.  Abdominal   (+) obese,    Substance History - negative use     OB/GYN negative ob/gyn ROS         Other   arthritis, autoimmune disease rheumatoid arthritis,                      Anesthesia Plan    ASA 3     MAC     intravenous induction     Anesthetic plan, all risks, benefits, and alternatives have been provided, discussed and informed consent has been obtained with: patient.  Use of blood products discussed with patient  Consented to blood products.       CODE STATUS:

## 2022-04-26 NOTE — ANESTHESIA POSTPROCEDURE EVALUATION
Patient: Diane Gerardo    Procedure Summary     Date: 04/26/22 Room / Location: [facility] ENDOSCOPY 1 /  [facility] OR    Anesthesia Start: 0945 Anesthesia Stop: 1010    Procedure: COLONOSCOPY with polypectomy (N/A ) Diagnosis:       Morbid obesity with BMI of 40.0-44.9, adult (HCC)      History of colonic polyps      RUQ pain      (Morbid obesity with BMI of 40.0-44.9, adult (HCC) [E66.01, Z68.41])      (History of colonic polyps [Z86.010])      (RUQ pain [R10.11])    Surgeons: Erin Fried DO Provider: Caleb Falk CRNA    Anesthesia Type: MAC ASA Status: 3          Anesthesia Type: MAC    Vitals  Vitals Value Taken Time   /62 04/26/22 1050   Temp 98.2 °F (36.8 °C) 04/26/22 1013   Pulse 60 04/26/22 1050   Resp 17 04/26/22 1050   SpO2 97 % 04/26/22 1050           Post Anesthesia Care and Evaluation    Patient location during evaluation: PHASE II  Patient participation: complete - patient participated  Level of consciousness: awake and alert  Pain score: 0  Pain management: adequate  Airway patency: patent  Anesthetic complications: No anesthetic complications  PONV Status: none  Cardiovascular status: acceptable  Respiratory status: acceptable  Hydration status: acceptable

## 2022-04-27 LAB
LAB AP CASE REPORT: NORMAL
PATH REPORT.FINAL DX SPEC: NORMAL
PATH REPORT.GROSS SPEC: NORMAL

## 2022-05-27 ENCOUNTER — OFFICE VISIT (OUTPATIENT)
Dept: ORTHOPEDIC SURGERY | Facility: CLINIC | Age: 67
End: 2022-05-27

## 2022-05-27 VITALS
DIASTOLIC BLOOD PRESSURE: 70 MMHG | WEIGHT: 240 LBS | HEART RATE: 75 BPM | SYSTOLIC BLOOD PRESSURE: 128 MMHG | BODY MASS INDEX: 40.97 KG/M2 | RESPIRATION RATE: 16 BRPM | HEIGHT: 64 IN

## 2022-05-27 DIAGNOSIS — M25.562 CHRONIC PAIN OF BOTH KNEES: Primary | ICD-10-CM

## 2022-05-27 DIAGNOSIS — M17.4 OTHER SECONDARY OSTEOARTHRITIS OF BOTH KNEES: ICD-10-CM

## 2022-05-27 DIAGNOSIS — M06.9 RHEUMATOID ARTHRITIS INVOLVING MULTIPLE JOINTS: ICD-10-CM

## 2022-05-27 DIAGNOSIS — M25.561 CHRONIC PAIN OF BOTH KNEES: Primary | ICD-10-CM

## 2022-05-27 DIAGNOSIS — G89.29 CHRONIC PAIN OF BOTH KNEES: Primary | ICD-10-CM

## 2022-05-27 PROCEDURE — 73562 X-RAY EXAM OF KNEE 3: CPT | Performed by: ORTHOPAEDIC SURGERY

## 2022-05-27 PROCEDURE — 99203 OFFICE O/P NEW LOW 30 MIN: CPT | Performed by: ORTHOPAEDIC SURGERY

## 2022-05-27 RX ORDER — MULTIPLE VITAMINS W/ MINERALS TAB 9MG-400MCG
1 TAB ORAL DAILY
COMMUNITY

## 2022-05-27 NOTE — PROGRESS NOTES
Subjective: Bilateral knee pain     Patient ID: Diane Gerardo is a 66 y.o. female.    Chief Complaint:    History of Present Illness 66-year-old female known to me and was not seen since 2016 returns with both knees very symptomatic and painful.  Previously seen and underwent gel injections which helped relieve your pain where cortisone and anti-inflammatories have not.  She is nondiabetic cannot take anti-inflammatory medications.  Back in 2016 insurance she had at that time would not pay for repeat gel injections and she just recently regarding new insurance as far as [employer] presents today for consideration of possible gel injections of the knee.       Social History     Occupational History   • Not on file   Tobacco Use   • Smoking status: Former Smoker     Packs/day: 1.00     Years: 10.00     Pack years: 10.00     Types: Cigarettes     Quit date:      Years since quittin.4   • Smokeless tobacco: Never Used   Vaping Use   • Vaping Use: Never used   Substance and Sexual Activity   • Alcohol use: Yes     Comment: occasional   • Drug use: No   • Sexual activity: Defer      Review of Systems   Constitutional: Negative for chills, diaphoresis, fever and unexpected weight change.   HENT: Positive for tinnitus. Negative for hearing loss, nosebleeds and sore throat.    Eyes: Positive for pain. Negative for visual disturbance.   Respiratory: Negative for cough, shortness of breath and wheezing.    Cardiovascular: Negative for chest pain and palpitations.   Gastrointestinal: Negative for abdominal pain, diarrhea, nausea and vomiting.   Endocrine: Negative for cold intolerance, heat intolerance and polydipsia.   Genitourinary: Negative for difficulty urinating, dysuria and hematuria.   Musculoskeletal: Positive for arthralgias, joint swelling and myalgias.   Skin: Negative for rash and wound.   Allergic/Immunologic: Negative for environmental allergies.   Neurological: Positive for dizziness. Negative for  syncope and numbness.   Hematological: Does not bruise/bleed easily.   Psychiatric/Behavioral: Negative for dysphoric mood and sleep disturbance. The patient is not nervous/anxious.          Past Medical History:   Diagnosis Date   • Anemia     history of   • Anxiety    • Colon polyp    • DDD (degenerative disc disease), lumbosacral    • Depression    • Diabetes mellitus (HCC)     BORDERLINE   • Disease of thyroid gland     hypothyroid   • Elevated cholesterol    • Hypertension    • IBS (irritable bowel syndrome)    • Mitral valve prolapse    • Rheumatoid arthritis (HCC)    • Sleep apnea with use of continuous positive airway pressure (CPAP)    • Ventral hernia     sched repair   • Ventral hernia     SCHEDULED FOR REPAIR     Past Surgical History:   Procedure Laterality Date   • ANTERIOR CERVICAL FUSION  2011    C4C5   • CHOLECYSTECTOMY      LAP   • COLONOSCOPY N/A 2/8/2017    Procedure: COLONOSCOPY;  Surgeon: Domi Juarez MD;  Location: [facility] OR;  Service:    • COLONOSCOPY W/ POLYPECTOMY N/A 4/26/2022    Procedure: COLONOSCOPY with polypectomy;  Surgeon: Erin Fried DO;  Location: [facility] OR;  Service: Gastroenterology;  Laterality: N/A;  right colon polyp  rectal polyps x3   • ENDOSCOPY N/A 2/8/2017    Procedure: ESOPHAGOGASTRODUODENOSCOPY;  Surgeon: Domi Juarez MD;  Location: [facility] OR;  Service:    • ENDOSCOPY N/A 7/21/2020    Procedure: ESOPHAGOGASTRODUODENOSCOPY with nirmal test;  Surgeon: Erin Fried DO;  Location: [facility] OR;  Service: Gastroenterology;  Laterality: N/A;  normal exam   • HYSTERECTOMY      partial 1984, 2002 complete   • LAPAROSCOPIC LYSIS OF ADHESIONS  2002    and cyst removal   • OVARIAN CYST REMOVAL      laparoscopic x2 last 2006   • VENTRAL/INCISIONAL HERNIA REPAIR N/A 8/2/2018    Procedure: VENTRAL AND SPIGELIAN HERNIA REPAIR LAPAROSCOPIC;  Surgeon: Erin Fried DO;  Location: [facility] OR;  Service: General   • VENTRAL/INCISIONAL HERNIA REPAIR N/A 6/6/2019     Procedure: VENTRAL/INCISIONAL HERNIA REPAIR;  Surgeon: Erin Fried DO;  Location: [facility] OR;  Service: General     Family History   Problem Relation Age of Onset   • Diabetes Mother    • COPD Mother    • Heart disease Father    • Cancer Father    • Prostate cancer Father    • Aneurysm Father    • Diabetes Brother    • Pancreatitis Brother    • Malig Hyperthermia Neg Hx    • Breast cancer Neg Hx          Objective:  Vitals:    05/27/22 1322   BP: 128/70   Pulse: 75   Resp: 16         05/27/22  1322   Weight: 109 kg (240 lb)     Body mass index is 41.2 kg/m².        Ortho Exam   AP lateral sunrise view of both knees does show some patellofemoral changes as far as decrease in joint space.  Otherwise no significant changes noted although slightly decreased in the medial joint space bilaterally there is no significant spurring.  Compared to x-rays done 6 years ago there is only slight progression.  She is alert and oriented x3.  Has normocephalic and sclerae clear.  Neither knee has any swelling effusion erythema the skin is cool to touch but there is marked crepitus with range of motion of the patellofemoral joint with range of motion which is 0 to 125 degrees of both knees.  There is marked pain particularly with patellar compression.  Mild medial joint line tenderness but negative Allen's.  Quad and hamstring function is 5/5.  No instability in flexion extension nor any varus or valgus instability at 10 to 30 degrees.  Her calves are nontender.  She has good distal pulses no motor or sensory deficit.  Again she is in her moderate pain interfere with any and all activities of daily living.    Assessment:        1. Chronic pain of both knees    2. Other secondary osteoarthritis of both knees    3. Rheumatoid arthritis involving multiple joints (HCC)           Plan: Reviewed the x-rays taken today of the ones of 6 years ago with the patient.  Shows mild progression particularly patellofemoral compartment.   After reviewing treatment options as she had an X response to the gel injection 6 years ago and has cortisone in the past has not worked and she cannot take anti-inflammatories regarding question of the insurance company once again improved with the with gel injections in both knees.  Return to undergo those injections once approval has been obtained.  Answered all questions            Work Status:    YANNICK query complete.    Orders:  Orders Placed This Encounter   Procedures   • XR Knee 3 View Bilateral   • Visco Treatment       Medications:  No orders of the defined types were placed in this encounter.      Followup:  Return in about 2 weeks (around 6/10/2022).          Dictated utilizing Dragon dictation

## 2022-06-28 ENCOUNTER — OFFICE VISIT (OUTPATIENT)
Dept: CARDIOLOGY | Facility: CLINIC | Age: 67
End: 2022-06-28

## 2022-06-28 VITALS
BODY MASS INDEX: 40.97 KG/M2 | DIASTOLIC BLOOD PRESSURE: 68 MMHG | HEIGHT: 64 IN | SYSTOLIC BLOOD PRESSURE: 128 MMHG | HEART RATE: 72 BPM | WEIGHT: 240 LBS

## 2022-06-28 DIAGNOSIS — I10 ESSENTIAL HYPERTENSION: Primary | ICD-10-CM

## 2022-06-28 DIAGNOSIS — E78.00 PURE HYPERCHOLESTEROLEMIA: ICD-10-CM

## 2022-06-28 DIAGNOSIS — E66.01 MORBID OBESITY WITH BMI OF 40.0-44.9, ADULT: ICD-10-CM

## 2022-06-28 DIAGNOSIS — R06.02 SOB (SHORTNESS OF BREATH): ICD-10-CM

## 2022-06-28 DIAGNOSIS — R07.2 PRECORDIAL PAIN: ICD-10-CM

## 2022-06-28 PROCEDURE — 99214 OFFICE O/P EST MOD 30 MIN: CPT | Performed by: INTERNAL MEDICINE

## 2022-06-28 PROCEDURE — 93000 ELECTROCARDIOGRAM COMPLETE: CPT | Performed by: INTERNAL MEDICINE

## 2022-06-28 RX ORDER — HYALURONATE SODIUM, STABILIZED 88 MG/4 ML
SYRINGE (ML) INTRAARTICULAR
COMMUNITY
Start: 2022-06-23 | End: 2023-02-14

## 2022-06-28 NOTE — PROGRESS NOTES
FOLLOW UP FROM 2019   REF BY DR. REY PEREZ MD   ABNORMAL STRESS TEST FROM 2019    Subjective:        Diane Gerardo is a 66 y.o. female who here for follow up    CC  WK, LIGHTHEADED  HPI  66-year-old female with precordial chest pain hyperlipidemia, hypertension has been complaining of weakness and lightheadedness, patient has not been seen for the last couple of the years, patient had a stress test which was abnormal which was not followed through     Problems Addressed this Visit        Cardiac and Vasculature    Hyperlipidemia    Precordial pain    Essential hypertension - Primary       Endocrine and Metabolic    Morbid obesity with BMI of 40.0-44.9, adult (HCC)       Pulmonary and Pneumonias    SOB (shortness of breath)      Diagnoses       Codes Comments    Essential hypertension    -  Primary ICD-10-CM: I10  ICD-9-CM: 401.9     Pure hypercholesterolemia     ICD-10-CM: E78.00  ICD-9-CM: 272.0     Morbid obesity with BMI of 40.0-44.9, adult (HCC)     ICD-10-CM: E66.01, Z68.41  ICD-9-CM: 278.01, V85.41     SOB (shortness of breath)     ICD-10-CM: R06.02  ICD-9-CM: 786.05     Precordial pain     ICD-10-CM: R07.2  ICD-9-CM: 786.51         .Interpretation Summary    · Findings consistent with a normal ECG stress test.  · Left ventricular ejection fraction is normal (Calculated EF = 60%).  · Myocardial perfusion imaging indicates a small-sized, mildly severe area of ischemia located in the lateral wall and septal wall.  · Impressions are consistent with an intermediate risk study    Interpretation Summary    · Left atrial cavity size is moderately dilated.  · There is calcification of the aortic valve.  · Mild mitral valve regurgitation is present  · Mild tricuspid valve regurgitation is present.  · Calculated EF = 64.0%  · There is no evidence of pericardial effusion.         The following portions of the patient's history were reviewed and updated as appropriate: allergies, current medications, past family  "history, past medical history, past social history, past surgical history and problem list.    Past Medical History:   Diagnosis Date   • Anemia     history of   • Anxiety    • Colon polyp    • DDD (degenerative disc disease), lumbosacral    • Depression    • Diabetes mellitus (HCC)     BORDERLINE   • Disease of thyroid gland     hypothyroid   • Elevated cholesterol    • Hypertension    • IBS (irritable bowel syndrome)    • Mitral valve prolapse    • Rheumatoid arthritis (HCC)    • Sleep apnea with use of continuous positive airway pressure (CPAP)    • Ventral hernia     sched repair   • Ventral hernia     SCHEDULED FOR REPAIR     reports that she quit smoking about 14 years ago. Her smoking use included cigarettes. She has a 10.00 pack-year smoking history. She has never used smokeless tobacco. She reports current alcohol use. She reports that she does not use drugs.   Family History   Problem Relation Age of Onset   • Diabetes Mother    • COPD Mother    • Heart disease Father    • Cancer Father    • Prostate cancer Father    • Aneurysm Father    • Diabetes Brother    • Pancreatitis Brother    • Malig Hyperthermia Neg Hx    • Breast cancer Neg Hx        Review of Systems  Constitutional: No wt loss, fever, fatigue  Gastrointestinal: No nausea, abdominal pain  Behavioral/Psych: No insomnia or anxiety   Cardiovascular weakness and lightheadedness  Objective:       Physical Exam  /68   Pulse 72   Ht 162.6 cm (64\")   Wt 109 kg (240 lb)   BMI 41.20 kg/m²   General appearance: No acute changes   Neck: Trachea midline; NECK, supple, no thyromegaly or lymphadenopathy   Lungs: Normal size and shape, normal breath sounds, equal distribution of air, no rales and rhonchi   CV: S1-S2 regular, no murmurs, no rub, no gallop   Abdomen: Soft, nontender; no masses , no abnormal abdominal sounds   Extremities: No deformity , normal color , no peripheral edema   Skin: Normal temperature, turgor and texture; no rash, " ulcers            ECG 12 Lead    Date/Time: 6/28/2022 2:35 PM  Performed by: Madan Hu MD  Authorized by: Madan Hu MD   Comparison: compared with previous ECG   Similar to previous ECG  Rhythm: sinus rhythm    Clinical impression: non-specific ECG              Echocardiogram:        Current Outpatient Medications:   •  hydrochlorothiazide (HYDRODIURIL) 25 MG tablet, Take 25 mg by mouth Every Morning., Disp: , Rfl:   •  levothyroxine (SYNTHROID, LEVOTHROID) 150 MCG tablet, Take 150 mcg by mouth Every Morning., Disp: , Rfl:   •  lisinopril (PRINIVIL,ZESTRIL) 10 MG tablet, Take 10 mg by mouth Daily., Disp: , Rfl: 3  •  metFORMIN (GLUCOPHAGE) 1000 MG tablet, Take 1,000 mg by mouth Every Evening., Disp: , Rfl:   •  Monovisc 88 MG/4ML solution prefilled syringe injection, , Disp: , Rfl:   •  multivitamin with minerals tablet tablet, Take 1 tablet by mouth Daily., Disp: , Rfl:    Assessment:        Patient Active Problem List   Diagnosis   • Osteoarthritis of both knees   • Anemia of chronic disorder   • Arthralgia of multiple joints   • Chronic depression   • Type 2 diabetes mellitus (HCC)   • Steatosis of liver   • Hypothyroidism due to Hashimoto's thyroiditis   • Lactose intolerance   • Non-toxic multinodular goiter   • Obstructive sleep apnea syndrome   • Hypothyroidism   • Rheumatoid arthritis involving multiple joints (HCC)   • Snoring   • Vitamin D deficiency   • Degeneration of intervertebral disc of lumbosacral region   • History of colonic polyps   • Hyperlipidemia   • Morbid obesity (HCC)   • Primary osteoarthritis of both knees   • Seasonal allergic rhinitis   • Wears eyeglasses   • Gastric polyps   • Internal hemorrhoids   • Gastritis   • Precordial pain   • Morbid obesity with BMI of 40.0-44.9, adult (HCC)   • SOB (shortness of breath)   • Essential hypertension   • RUQ pain   • History of gastritis   • History of gastric polyp               Plan:            ICD-10-CM ICD-9-CM   1.  Essential hypertension  I10 401.9   2. Pure hypercholesterolemia  E78.00 272.0   3. Morbid obesity with BMI of 40.0-44.9, adult ([facility])  E66.01 278.01    Z68.41 V85.41   4. SOB (shortness of breath)  R06.02 786.05   5. Precordial pain  R07.2 786.51     1. Essential hypertension  Blood pressure under control    2. Pure hypercholesterolemia  Continue current treatment    3. Morbid obesity with BMI of 40.0-44.9, adult ([facility])  Counseling done    4. SOB (shortness of breath)  Multifactorial    5. Precordial pain  Considering the patient's symptoms as well as clinical situation and  EKG findings, along with cardiac risk factors, ischemic workup is necessary to rule out ischemic cardiomyopathy, stress induced arrhythmias, and functional capacity for diagnosis as well as prognostic consideration       STRESS CARDIOLYTE, ECHO  FOLLOW UP  COUNSELING:    Diane Gudino was given to patient for the following topics: diagnostic results, risk factor reductions, impressions, risks and benefits of treatment options and importance of treatment compliance .       SMOKING COUNSELING:    [unfilled]    Dictated using Dragon dictation

## 2022-07-07 ENCOUNTER — APPOINTMENT (OUTPATIENT)
Dept: NUCLEAR MEDICINE | Facility: HOSPITAL | Age: 67
End: 2022-07-07

## 2022-07-07 ENCOUNTER — APPOINTMENT (OUTPATIENT)
Dept: CARDIOLOGY | Facility: HOSPITAL | Age: 67
End: 2022-07-07

## 2022-07-14 ENCOUNTER — APPOINTMENT (OUTPATIENT)
Dept: CARDIOLOGY | Facility: HOSPITAL | Age: 67
End: 2022-07-14

## 2022-07-25 ENCOUNTER — HOSPITAL ENCOUNTER (OUTPATIENT)
Dept: NUCLEAR MEDICINE | Facility: HOSPITAL | Age: 67
Discharge: HOME | End: 2022-07-25

## 2022-07-25 ENCOUNTER — HOSPITAL ENCOUNTER (OUTPATIENT)
Dept: CARDIOLOGY | Facility: HOSPITAL | Age: 67
Setting detail: HOSPITAL OUTPATIENT SURGERY
Discharge: HOME | End: 2022-07-25

## 2022-07-25 VITALS
BODY MASS INDEX: 41.03 KG/M2 | SYSTOLIC BLOOD PRESSURE: 149 MMHG | DIASTOLIC BLOOD PRESSURE: 60 MMHG | HEIGHT: 64 IN | HEART RATE: 69 BPM | WEIGHT: 240.3 LBS

## 2022-07-25 LAB
AORTIC DIMENSIONLESS INDEX: 0.8 (DI)
BH CV ECHO MEAS - AI P1/2T: 540.8 MSEC
BH CV ECHO MEAS - AO MAX PG: 10 MMHG
BH CV ECHO MEAS - AO MEAN PG: 5 MMHG
BH CV ECHO MEAS - AO V2 MAX: 158 CM/SEC
BH CV ECHO MEAS - AO V2 VTI: 35.6 CM
BH CV ECHO MEAS - AVA(I,D): 2.7 CM2
BH CV ECHO MEAS - EDV(CUBED): 157.5 ML
BH CV ECHO MEAS - EDV(MOD-SP2): 90 ML
BH CV ECHO MEAS - EDV(MOD-SP4): 106 ML
BH CV ECHO MEAS - EF(MOD-BP): 61.9 %
BH CV ECHO MEAS - EF(MOD-SP2): 66.7 %
BH CV ECHO MEAS - EF(MOD-SP4): 59.4 %
BH CV ECHO MEAS - ESV(CUBED): 33.7 ML
BH CV ECHO MEAS - ESV(MOD-SP2): 30 ML
BH CV ECHO MEAS - ESV(MOD-SP4): 43 ML
BH CV ECHO MEAS - FS: 40.2 %
BH CV ECHO MEAS - IVS/LVPW: 1 CM
BH CV ECHO MEAS - IVSD: 1 CM
BH CV ECHO MEAS - LAT PEAK E' VEL: 10 CM/SEC
BH CV ECHO MEAS - LV DIASTOLIC VOL/BSA (35-75): 50.2 CM2
BH CV ECHO MEAS - LV MASS(C)D: 206.7 GRAMS
BH CV ECHO MEAS - LV MAX PG: 4.1 MMHG
BH CV ECHO MEAS - LV MEAN PG: 2 MMHG
BH CV ECHO MEAS - LV SYSTOLIC VOL/BSA (12-30): 20.4 CM2
BH CV ECHO MEAS - LV V1 MAX: 101 CM/SEC
BH CV ECHO MEAS - LV V1 VTI: 25.2 CM
BH CV ECHO MEAS - LVIDD: 5.4 CM
BH CV ECHO MEAS - LVIDS: 3.2 CM
BH CV ECHO MEAS - LVOT AREA: 3.7 CM2
BH CV ECHO MEAS - LVOT DIAM: 2.18 CM
BH CV ECHO MEAS - LVPWD: 1 CM
BH CV ECHO MEAS - MED PEAK E' VEL: 9.2 CM/SEC
BH CV ECHO MEAS - MV A MAX VEL: 92.5 CM/SEC
BH CV ECHO MEAS - MV DEC SLOPE: 367.1 CM/SEC2
BH CV ECHO MEAS - MV DEC TIME: 0.17 MSEC
BH CV ECHO MEAS - MV E MAX VEL: 84.7 CM/SEC
BH CV ECHO MEAS - MV E/A: 0.92
BH CV ECHO MEAS - MV MAX PG: 5.4 MMHG
BH CV ECHO MEAS - MV MEAN PG: 2.7 MMHG
BH CV ECHO MEAS - MV P1/2T: 98 MSEC
BH CV ECHO MEAS - MV V2 VTI: 39.7 CM
BH CV ECHO MEAS - MVA(P1/2T): 2.24 CM2
BH CV ECHO MEAS - MVA(VTI): 2.38 CM2
BH CV ECHO MEAS - PA ACC TIME: 0.1 SEC
BH CV ECHO MEAS - PA PR(ACCEL): 34.6 MMHG
BH CV ECHO MEAS - PA V2 MAX: 100.8 CM/SEC
BH CV ECHO MEAS - QP/QS: 1.52
BH CV ECHO MEAS - RAP SYSTOLE: 8 MMHG
BH CV ECHO MEAS - RV MAX PG: 2.31 MMHG
BH CV ECHO MEAS - RV V1 MAX: 76 CM/SEC
BH CV ECHO MEAS - RV V1 VTI: 21.2 CM
BH CV ECHO MEAS - RVOT DIAM: 2.9 CM
BH CV ECHO MEAS - RVSP: 30 MMHG
BH CV ECHO MEAS - SI(MOD-SP2): 28.4 ML/M2
BH CV ECHO MEAS - SI(MOD-SP4): 29.9 ML/M2
BH CV ECHO MEAS - SV(LVOT): 94.4 ML
BH CV ECHO MEAS - SV(MOD-SP2): 60 ML
BH CV ECHO MEAS - SV(MOD-SP4): 63 ML
BH CV ECHO MEAS - SV(RVOT): 143.6 ML
BH CV ECHO MEAS - TR MAX PG: 22.5 MMHG
BH CV ECHO MEAS - TR MAX VEL: 236.9 CM/SEC
BH CV ECHO MEASUREMENTS AVERAGE E/E' RATIO: 8.82
BH CV XLRA - RV BASE: 3.7 CM
BH CV XLRA - RV LENGTH: 8.2 CM
BH CV XLRA - RV MID: 3.4 CM
BH CV XLRA - TDI S': 16 CM/SEC
LEFT ATRIUM VOLUME INDEX: 18.9 ML/M2
MAXIMAL PREDICTED HEART RATE: 155 BPM
SINUS: 3.4 CM
STRESS TARGET HR: 132 BPM

## 2022-07-25 PROCEDURE — 78452 HT MUSCLE IMAGE SPECT MULT: CPT

## 2022-07-25 PROCEDURE — 93017 CV STRESS TEST TRACING ONLY: CPT

## 2022-07-25 PROCEDURE — 25010000002 REGADENOSON 0.4 MG/5ML SOLUTION: Performed by: INTERNAL MEDICINE

## 2022-07-25 PROCEDURE — 0 TECHNETIUM SESTAMIBI: Performed by: INTERNAL MEDICINE

## 2022-07-25 PROCEDURE — A9500 TC99M SESTAMIBI: HCPCS | Performed by: INTERNAL MEDICINE

## 2022-07-25 PROCEDURE — 93306 TTE W/DOPPLER COMPLETE: CPT

## 2022-07-25 PROCEDURE — 25010000002 PERFLUTREN (DEFINITY) 8.476 MG IN SODIUM CHLORIDE (PF) 0.9 % 10 ML INJECTION: Performed by: INTERNAL MEDICINE

## 2022-07-25 PROCEDURE — 93018 CV STRESS TEST I&R ONLY: CPT | Performed by: INTERNAL MEDICINE

## 2022-07-25 PROCEDURE — 93306 TTE W/DOPPLER COMPLETE: CPT | Performed by: INTERNAL MEDICINE

## 2022-07-25 PROCEDURE — 78452 HT MUSCLE IMAGE SPECT MULT: CPT | Performed by: INTERNAL MEDICINE

## 2022-07-25 RX ADMIN — TECHNETIUM TC 99M SESTAMIBI 1 DOSE: 1 INJECTION INTRAVENOUS at 08:27

## 2022-07-25 RX ADMIN — SODIUM CHLORIDE 2 ML: 9 INJECTION INTRAMUSCULAR; INTRAVENOUS; SUBCUTANEOUS at 09:48

## 2022-07-25 RX ADMIN — TECHNETIUM TC 99M SESTAMIBI 1 DOSE: 1 INJECTION INTRAVENOUS at 06:54

## 2022-07-25 RX ADMIN — REGADENOSON 0.4 MG: 0.08 INJECTION, SOLUTION INTRAVENOUS at 08:27

## 2022-07-28 LAB
BH CV REST NUCLEAR ISOTOPE DOSE: 11.5 MCI
BH CV STRESS BP STAGE 1: NORMAL
BH CV STRESS COMMENTS STAGE 1: NORMAL
BH CV STRESS DOSE REGADENOSON STAGE 1: 0.4
BH CV STRESS DURATION MIN STAGE 1: 0
BH CV STRESS DURATION SEC STAGE 1: 30
BH CV STRESS HR STAGE 1: 65
BH CV STRESS NUCLEAR ISOTOPE DOSE: 34.7 MCI
BH CV STRESS O2 STAGE 1: 98
BH CV STRESS PROTOCOL 1: NORMAL
BH CV STRESS RECOVERY BP: NORMAL MMHG
BH CV STRESS RECOVERY HR: 83 BPM
BH CV STRESS RECOVERY O2: 98 %
BH CV STRESS STAGE 1: 1
LV EF NUC BP: 60 %
MAXIMAL PREDICTED HEART RATE: 155 BPM
PERCENT MAX PREDICTED HR: 59.35 %
STRESS BASELINE BP: NORMAL MMHG
STRESS BASELINE HR: 63 BPM
STRESS O2 SAT REST: 97 %
STRESS PERCENT HR: 70 %
STRESS POST ESTIMATED WORKLOAD: 1 METS
STRESS POST EXERCISE DUR SEC: 30 SEC
STRESS POST O2 SAT PEAK: 100 %
STRESS POST PEAK BP: NORMAL MMHG
STRESS POST PEAK HR: 92 BPM
STRESS TARGET HR: 132 BPM

## 2022-08-11 ENCOUNTER — CLINICAL SUPPORT (OUTPATIENT)
Dept: ORTHOPEDIC SURGERY | Facility: CLINIC | Age: 67
End: 2022-08-11

## 2022-08-11 VITALS — BODY MASS INDEX: 40.97 KG/M2 | WEIGHT: 240 LBS | HEIGHT: 64 IN

## 2022-08-11 DIAGNOSIS — M17.4 OTHER SECONDARY OSTEOARTHRITIS OF BOTH KNEES: ICD-10-CM

## 2022-08-11 DIAGNOSIS — M25.561 CHRONIC PAIN OF BOTH KNEES: Primary | ICD-10-CM

## 2022-08-11 DIAGNOSIS — G89.29 CHRONIC PAIN OF BOTH KNEES: Primary | ICD-10-CM

## 2022-08-11 DIAGNOSIS — M25.562 CHRONIC PAIN OF BOTH KNEES: Primary | ICD-10-CM

## 2022-08-11 PROCEDURE — 20610 DRAIN/INJ JOINT/BURSA W/O US: CPT | Performed by: ORTHOPAEDIC SURGERY

## 2022-08-11 NOTE — PROGRESS NOTES
Subjective:  Osteoarthritis both knees   Patient ID: Diane Gerardo is a 66 y.o. female.    Chief Complaint:    History of Present Illness 66-year female seen for Monovisc injection into both knees       Social History     Occupational History   • Not on file   Tobacco Use   • Smoking status: Former Smoker     Packs/day: 1.00     Years: 10.00     Pack years: 10.00     Types: Cigarettes     Quit date:      Years since quittin.6   • Smokeless tobacco: Never Used   Vaping Use   • Vaping Use: Never used   Substance and Sexual Activity   • Alcohol use: Yes     Comment: occasional   • Drug use: No   • Sexual activity: Defer      Review of Systems   Constitutional: Negative for chills, diaphoresis, fever and unexpected weight change.   HENT: Negative for hearing loss, nosebleeds, sore throat and tinnitus.    Eyes: Negative for pain and visual disturbance.   Respiratory: Negative for cough, shortness of breath and wheezing.    Cardiovascular: Negative for chest pain and palpitations.   Gastrointestinal: Negative for abdominal pain, diarrhea, nausea and vomiting.   Endocrine: Negative for cold intolerance, heat intolerance and polydipsia.   Genitourinary: Negative for difficulty urinating, dysuria and hematuria.   Musculoskeletal: Positive for arthralgias and myalgias. Negative for joint swelling.   Skin: Negative for rash and wound.   Allergic/Immunologic: Negative for environmental allergies.   Neurological: Negative for dizziness, syncope and numbness.   Hematological: Does not bruise/bleed easily.   Psychiatric/Behavioral: Negative for dysphoric mood and sleep disturbance. The patient is not nervous/anxious.          Past Medical History:   Diagnosis Date   • Anemia     history of   • Anxiety    • Colon polyp    • DDD (degenerative disc disease), lumbosacral    • Depression    • Diabetes mellitus (HCC)     BORDERLINE   • Disease of thyroid gland     hypothyroid   • Elevated cholesterol    • Hypertension    • IBS  (irritable bowel syndrome)    • Mitral valve prolapse    • Rheumatoid arthritis (HCC)    • Sleep apnea with use of continuous positive airway pressure (CPAP)    • Ventral hernia     sched repair   • Ventral hernia     SCHEDULED FOR REPAIR     Past Surgical History:   Procedure Laterality Date   • ANTERIOR CERVICAL FUSION  2011    C4C5   • CHOLECYSTECTOMY      LAP   • COLONOSCOPY N/A 2/8/2017    Procedure: COLONOSCOPY;  Surgeon: Domi Juarez MD;  Location:  [facility] OR;  Service:    • COLONOSCOPY W/ POLYPECTOMY N/A 4/26/2022    Procedure: COLONOSCOPY with polypectomy;  Surgeon: Erin Fried DO;  Location:  [facility] OR;  Service: Gastroenterology;  Laterality: N/A;  right colon polyp  rectal polyps x3   • ENDOSCOPY N/A 2/8/2017    Procedure: ESOPHAGOGASTRODUODENOSCOPY;  Surgeon: Domi Juarez MD;  Location:  [facility] OR;  Service:    • ENDOSCOPY N/A 7/21/2020    Procedure: ESOPHAGOGASTRODUODENOSCOPY with nirmal test;  Surgeon: Erin Fried DO;  Location:  [facility] OR;  Service: Gastroenterology;  Laterality: N/A;  normal exam   • HYSTERECTOMY      partial 1984, 2002 complete   • LAPAROSCOPIC LYSIS OF ADHESIONS  2002    and cyst removal   • OVARIAN CYST REMOVAL      laparoscopic x2 last 2006   • VENTRAL/INCISIONAL HERNIA REPAIR N/A 8/2/2018    Procedure: VENTRAL AND SPIGELIAN HERNIA REPAIR LAPAROSCOPIC;  Surgeon: Erin Fried DO;  Location: [facility] OR;  Service: General   • VENTRAL/INCISIONAL HERNIA REPAIR N/A 6/6/2019    Procedure: VENTRAL/INCISIONAL HERNIA REPAIR;  Surgeon: Erin Fried DO;  Location: [facility] OR;  Service: General     Family History   Problem Relation Age of Onset   • Diabetes Mother    • COPD Mother    • Heart disease Father    • Cancer Father    • Prostate cancer Father    • Aneurysm Father    • Diabetes Brother    • Pancreatitis Brother    • Malig Hyperthermia Neg Hx    • Breast cancer Neg Hx          Objective:  There were no vitals filed for this visit.      08/11/22  1340    Weight: 109 kg (240 lb)     Body mass index is 41.48 kg/m².        Ortho Exam 6 mL injection given to each knee through the superolateral portal after sterile prep without complications.  Postinjection was given to the patient.         Assessment:        1. Chronic pain of both knees    2. Other secondary osteoarthritis of both knees           Plan: Return as needed.  Large Joint Arthrocentesis  Date/Time: 8/11/2022 1:42 PM  Consent given by: patient  Site marked: site marked  Timeout: Immediately prior to procedure a time out was called to verify the correct patient, procedure, equipment, support staff and site/side marked as required   Supporting Documentation  Indications: pain   Procedure Details  Location: knee (bilateral) -   Preparation: Patient was prepped and draped in the usual sterile fashion  Needle gauge: 21x1-1/2.  Approach: superior  Medications administered: 88 mg Hyaluronan 88 MG/4ML  Patient tolerance: patient tolerated the procedure well with no immediate complications                  Work Status:    YANNICK query complete.    Orders:  Orders Placed This Encounter   Procedures   • Large Joint Arthrocentesis       Medications:  No orders of the defined types were placed in this encounter.      Followup:  Return if symptoms worsen or fail to improve.          Dictated utilizing Dragon dictation

## 2022-08-24 ENCOUNTER — TELEPHONE (OUTPATIENT)
Dept: CARDIOLOGY | Facility: CLINIC | Age: 67
End: 2022-08-24

## 2022-08-24 NOTE — TELEPHONE ENCOUNTER
----- Message from Sophie Shafer sent at 8/22/2022 10:04 AM EDT -----  Regarding: RESULTS  Contact: [phone]  PT HAD APPT AT [place] TOMORROW BUT APPT WAS MOVED TO THE 30TH.  PT WANTS TO KNOW ABOUT HER RESULTS IF WE CAN GIVE THEM TO HER OVER PHONE

## 2022-08-30 ENCOUNTER — OFFICE VISIT (OUTPATIENT)
Dept: CARDIOLOGY | Facility: CLINIC | Age: 67
End: 2022-08-30

## 2022-08-30 VITALS
SYSTOLIC BLOOD PRESSURE: 110 MMHG | DIASTOLIC BLOOD PRESSURE: 71 MMHG | HEART RATE: 72 BPM | HEIGHT: 64 IN | WEIGHT: 242 LBS | BODY MASS INDEX: 41.32 KG/M2

## 2022-08-30 DIAGNOSIS — E78.00 PURE HYPERCHOLESTEROLEMIA: ICD-10-CM

## 2022-08-30 DIAGNOSIS — E66.01 MORBID OBESITY WITH BMI OF 40.0-44.9, ADULT: ICD-10-CM

## 2022-08-30 DIAGNOSIS — R06.02 SOB (SHORTNESS OF BREATH): ICD-10-CM

## 2022-08-30 DIAGNOSIS — I10 ESSENTIAL HYPERTENSION: Primary | ICD-10-CM

## 2022-08-30 PROCEDURE — 99214 OFFICE O/P EST MOD 30 MIN: CPT | Performed by: INTERNAL MEDICINE

## 2022-08-30 NOTE — PROGRESS NOTES
STRESS TEST AND ECHO RESULTS    Subjective:        Diane Gerardo is a 66 y.o. female who here for follow up    CC  Continues complaining of the weakness and tiredness episodes  HPI  66-year-old female here for the results continues to complains of the episodes when the patient while sitting suddenly has a weakness lasting for approximately 2 minutes without any chest pains or tightness in the chest     Problems Addressed this Visit        Cardiac and Vasculature    Hyperlipidemia    Essential hypertension - Primary       Endocrine and Metabolic    Morbid obesity with BMI of 40.0-44.9, adult ([facility])       Pulmonary and Pneumonias    SOB (shortness of breath)      Diagnoses       Codes Comments    Essential hypertension    -  Primary ICD-10-CM: I10  ICD-9-CM: 401.9     Pure hypercholesterolemia     ICD-10-CM: E78.00  ICD-9-CM: 272.0     SOB (shortness of breath)     ICD-10-CM: R06.02  ICD-9-CM: 786.05     Morbid obesity with BMI of 40.0-44.9, adult (HCC)     ICD-10-CM: E66.01, Z68.41  ICD-9-CM: 278.01, V85.41         .    The following portions of the patient's history were reviewed and updated as appropriate: allergies, current medications, past family history, past medical history, past social history, past surgical history and problem list.    Past Medical History:   Diagnosis Date   • Anemia     history of   • Anxiety    • Colon polyp    • DDD (degenerative disc disease), lumbosacral    • Depression    • Diabetes mellitus (HCC)     BORDERLINE   • Disease of thyroid gland     hypothyroid   • Elevated cholesterol    • Hypertension    • IBS (irritable bowel syndrome)    • Mitral valve prolapse    • Rheumatoid arthritis (HCC)    • Sleep apnea with use of continuous positive airway pressure (CPAP)    • Ventral hernia     sched repair   • Ventral hernia     SCHEDULED FOR REPAIR     reports that she quit smoking about 14 years ago. Her smoking use included cigarettes. She has a 10.00 pack-year smoking history. She has  "never used smokeless tobacco. She reports current alcohol use. She reports that she does not use drugs.   Family History   Problem Relation Age of Onset   • Diabetes Mother    • COPD Mother    • Heart disease Father    • Cancer Father    • Prostate cancer Father    • Aneurysm Father    • Diabetes Brother    • Pancreatitis Brother    • Malig Hyperthermia Neg Hx    • Breast cancer Neg Hx        Review of Systems  Constitutional: No wt loss, fever, fatigue  Gastrointestinal: No nausea, abdominal pain  Behavioral/Psych: No insomnia or anxiety   Cardiovascular weakness  Objective:       Physical Exam  /71   Pulse 72   Ht 162 cm (63.78\")   Wt 110 kg (242 lb)   BMI 41.83 kg/m²   General appearance: No acute changes   Neck: Trachea midline; NECK, supple, no thyromegaly or lymphadenopathy   Lungs: Normal size and shape, normal breath sounds, equal distribution of air, no rales and rhonchi   CV: S1-S2 regular, no murmurs, no rub, no gallop   Abdomen: Soft, nontender; no masses , no abnormal abdominal sounds   Extremities: No deformity , normal color , no peripheral edema   Skin: Normal temperature, turgor and texture; no rash, ulcers          Procedures      Echocardiogram:        Current Outpatient Medications:   •  hydrochlorothiazide (HYDRODIURIL) 25 MG tablet, Take 12.5 mg by mouth Every Morning., Disp: , Rfl:   •  levothyroxine (SYNTHROID, LEVOTHROID) 150 MCG tablet, Take 150 mcg by mouth Every Morning., Disp: , Rfl:   •  lisinopril (PRINIVIL,ZESTRIL) 10 MG tablet, Take 10 mg by mouth Daily., Disp: , Rfl: 3  •  metFORMIN (GLUCOPHAGE) 1000 MG tablet, Take 1,000 mg by mouth Every Evening., Disp: , Rfl:   •  Monovisc 88 MG/4ML solution prefilled syringe injection, , Disp: , Rfl:   •  multivitamin with minerals tablet tablet, Take 1 tablet by mouth Daily., Disp: , Rfl:    Assessment:        Patient Active Problem List   Diagnosis   • Osteoarthritis of both knees   • Anemia of chronic disorder   • Arthralgia of " multiple joints   • Chronic depression   • Type 2 diabetes mellitus (HCC)   • Steatosis of liver   • Hypothyroidism due to Hashimoto's thyroiditis   • Lactose intolerance   • Non-toxic multinodular goiter   • Obstructive sleep apnea syndrome   • Hypothyroidism   • Rheumatoid arthritis involving multiple joints (HCC)   • Snoring   • Vitamin D deficiency   • Degeneration of intervertebral disc of lumbosacral region   • History of colonic polyps   • Hyperlipidemia   • Morbid obesity (HCC)   • Primary osteoarthritis of both knees   • Seasonal allergic rhinitis   • Wears eyeglasses   • Gastric polyps   • Internal hemorrhoids   • Gastritis   • Precordial pain   • Morbid obesity with BMI of 40.0-44.9, adult (HCC)   • SOB (shortness of breath)   • Essential hypertension   • RUQ pain   • History of gastritis   • History of gastric polyp     Interpretation Summary    · Findings consistent with a normal ECG stress test.  · Breast attenuation artifact is present.  · Left ventricular ejection fraction is normal. (Calculated EF = 60%).  · Myocardial perfusion imaging indicates a normal myocardial perfusion study with no evidence of ischemia.  · Impressions are consistent with a low risk study.  · Very small Anterior defect seen on polar view has questionable significance     Interpretation Summary    · Peak velocity of the flow distal to the aortic valve is 158 cm/s. Aortic valve maximum pressure gradient is 10 mmHg. Aortic valve mean pressure gradient is 5 mmHg. Aortic valve dimensionless index is 0.8 .  · Estimated right ventricular systolic pressure from tricuspid regurgitation is normal (<35 mmHg). Calculated right ventricular systolic pressure from tricuspid regurgitation is 30 mmHg.  · There is calcification of the aortic valve.  · Calculated left ventricular EF = 61.9% Estimated left ventricular EF was in agreement with the calculated left ventricular EF.  · Left ventricular diastolic function was normal.               Plan:            ICD-10-CM ICD-9-CM   1. Essential hypertension  I10 401.9   2. Pure hypercholesterolemia  E78.00 272.0   3. SOB (shortness of breath)  R06.02 786.05   4. Morbid obesity with BMI of 40.0-44.9, adult (HCC)  E66.01 278.01    Z68.41 V85.41     1. Essential hypertension  Blood pressure controlled    2. Pure hypercholesterolemia  Continue current treatment    3. SOB (shortness of breath)  Multifactorial    4. Morbid obesity with BMI of 40.0-44.9, adult ([facility])  Counseling done     Specificity and sensitivity of the stress test/ cardiac workup has been explained. Pt has been explained if  Symptoms continue please go to ER, and further w/p will be required.    Also explained this does not rule out coronary artery disease or the future events, continue to emphasize on risk reductions for coronary artery disease    Pt also advised to contact PCP for other causes of symptoms      1MONTH  IF KEEP HAVING SYMPTOMS WILL NEED CATH    Patient continues to feel weak and tired episode  COUNSELING:    Diane Gudino was given to patient for the following topics: diagnostic results, risk factor reductions, impressions, risks and benefits of treatment options and importance of treatment compliance .       SMOKING COUNSELING:    [unfilled]    Dictated using Dragon dictation

## 2022-09-27 ENCOUNTER — OFFICE VISIT (OUTPATIENT)
Dept: CARDIOLOGY | Facility: CLINIC | Age: 67
End: 2022-09-27

## 2022-09-27 VITALS
DIASTOLIC BLOOD PRESSURE: 71 MMHG | HEIGHT: 64 IN | BODY MASS INDEX: 40.63 KG/M2 | SYSTOLIC BLOOD PRESSURE: 151 MMHG | WEIGHT: 238 LBS | HEART RATE: 72 BPM

## 2022-09-27 DIAGNOSIS — R07.2 PRECORDIAL PAIN: Primary | ICD-10-CM

## 2022-09-27 DIAGNOSIS — I10 PRIMARY HYPERTENSION: ICD-10-CM

## 2022-09-27 DIAGNOSIS — R06.09 DYSPNEA ON EXERTION: ICD-10-CM

## 2022-09-27 DIAGNOSIS — R55 SYNCOPE AND COLLAPSE: ICD-10-CM

## 2022-09-27 PROCEDURE — 99213 OFFICE O/P EST LOW 20 MIN: CPT | Performed by: INTERNAL MEDICINE

## 2022-09-27 NOTE — PROGRESS NOTES
1 MONTH FOLLOW UP, STILL EXPERIENCING SYMPTOMS.   Subjective:        Diane Gerardo is a 66 y.o. female who here for follow up    CC  STILL HAVE TIGHTNESS, FAINT, SOB  HPI  65-year-old female with history of the diabetes continues to complains of chest pains tightness in the chest as well as shortness of breath and dizziness     Problems Addressed this Visit        Cardiac and Vasculature    Precordial pain - Primary    Dyspnea on exertion    Primary hypertension      Diagnoses       Codes Comments    Precordial pain    -  Primary ICD-10-CM: R07.2  ICD-9-CM: 786.51     Dyspnea on exertion     ICD-10-CM: R06.00  ICD-9-CM: 786.09     Primary hypertension     ICD-10-CM: I10  ICD-9-CM: 401.9         .    The following portions of the patient's history were reviewed and updated as appropriate: allergies, current medications, past family history, past medical history, past social history, past surgical history and problem list.    Past Medical History:   Diagnosis Date   • Anemia     history of   • Anxiety    • Colon polyp    • DDD (degenerative disc disease), lumbosacral    • Depression    • Diabetes mellitus (HCC)     BORDERLINE   • Disease of thyroid gland     hypothyroid   • Elevated cholesterol    • Hypertension    • IBS (irritable bowel syndrome)    • Mitral valve prolapse    • Rheumatoid arthritis (HCC)    • Sleep apnea with use of continuous positive airway pressure (CPAP)    • Ventral hernia     sched repair   • Ventral hernia     SCHEDULED FOR REPAIR     reports that she quit smoking about 14 years ago. Her smoking use included cigarettes. She has a 10.00 pack-year smoking history. She has never used smokeless tobacco. She reports current alcohol use. She reports that she does not use drugs.   Family History   Problem Relation Age of Onset   • Diabetes Mother    • COPD Mother    • Heart disease Father    • Cancer Father    • Prostate cancer Father    • Aneurysm Father    • Diabetes Brother    • Pancreatitis  "Brother    • Priti Hyperthermia Neg Hx    • Breast cancer Neg Hx        Review of Systems  Constitutional: No wt loss, fever, fatigue  Gastrointestinal: No nausea, abdominal pain  Behavioral/Psych: No insomnia or anxiety   Cardiovascular chest pains dizziness lightheadedness  Objective:       Physical Exam  /71   Pulse 72   Ht 161.9 cm (63.75\")   Wt 108 kg (238 lb)   BMI 41.17 kg/m²   General appearance: No acute changes   Neck: Trachea midline; NECK, supple, no thyromegaly or lymphadenopathy   Lungs: Normal size and shape, normal breath sounds, equal distribution of air, no rales and rhonchi   CV: S1-S2 regular, no murmurs, no rub, no gallop   Abdomen: Soft, nontender; no masses , no abnormal abdominal sounds   Extremities: No deformity , normal color , no peripheral edema   Skin: Normal temperature, turgor and texture; no rash, ulcers          Procedures      Echocardiogram:        Current Outpatient Medications:   •  hydrochlorothiazide (HYDRODIURIL) 25 MG tablet, Take 12.5 mg by mouth Every Morning., Disp: , Rfl:   •  levothyroxine (SYNTHROID, LEVOTHROID) 150 MCG tablet, Take 150 mcg by mouth Every Morning., Disp: , Rfl:   •  lisinopril (PRINIVIL,ZESTRIL) 10 MG tablet, Take 10 mg by mouth Daily., Disp: , Rfl: 3  •  metFORMIN (GLUCOPHAGE) 1000 MG tablet, Take 1,000 mg by mouth Every Evening., Disp: , Rfl:   •  Monovisc 88 MG/4ML solution prefilled syringe injection, Every 6 (Six) Months., Disp: , Rfl:   •  multivitamin with minerals tablet tablet, Take 1 tablet by mouth Daily., Disp: , Rfl:    Assessment:        Patient Active Problem List   Diagnosis   • Osteoarthritis of both knees   • Anemia of chronic disorder   • Arthralgia of multiple joints   • Chronic depression   • Type 2 diabetes mellitus (HCC)   • Steatosis of liver   • Hypothyroidism due to Hashimoto's thyroiditis   • Lactose intolerance   • Non-toxic multinodular goiter   • Obstructive sleep apnea syndrome   • Hypothyroidism   • Rheumatoid " arthritis involving multiple joints (HCC)   • Snoring   • Vitamin D deficiency   • Degeneration of intervertebral disc of lumbosacral region   • History of colonic polyps   • Hyperlipidemia   • Morbid obesity (HCC)   • Primary osteoarthritis of both knees   • Seasonal allergic rhinitis   • Wears eyeglasses   • Gastric polyps   • Internal hemorrhoids   • Gastritis   • Precordial pain   • Morbid obesity with BMI of 40.0-44.9, adult (HCC)   • SOB (shortness of breath)   • Essential hypertension   • RUQ pain   • History of gastritis   • History of gastric polyp               Plan:            ICD-10-CM ICD-9-CM   1. Precordial pain  R07.2 786.51   2. Dyspnea on exertion  R06.00 786.09   3. Primary hypertension  I10 401.9     1. Precordial pain  Needs further evaluation    2. Dyspnea on exertion  Needs further evaluation    3. Primary hypertension  Blood pressure controlled       US CAROTIDS    Procedure, risks and options of cardiac cath explained to pt INCLUDING BUT NOT LIMITED TO MI, STROKE, DEATH, INFECTION HAEMORRHAGE, . Pt understands well and agrees with no further questions.    COUNSELING:    Diane Gudino was given to patient for the following topics: diagnostic results, risk factor reductions, impressions, risks and benefits of treatment options and importance of treatment compliance .       SMOKING COUNSELING:    [unfilled]    Dictated using Dragon dictation

## 2022-10-06 ENCOUNTER — HOSPITAL ENCOUNTER (OUTPATIENT)
Dept: GENERAL RADIOLOGY | Facility: HOSPITAL | Age: 67
Discharge: HOME | End: 2022-10-06

## 2022-10-06 ENCOUNTER — TRANSCRIBE ORDERS (OUTPATIENT)
Dept: ADMINISTRATIVE | Facility: HOSPITAL | Age: 67
End: 2022-10-06

## 2022-10-06 DIAGNOSIS — M54.2 NECK PAIN: ICD-10-CM

## 2022-10-06 DIAGNOSIS — M54.9 BACK PAIN, UNSPECIFIED BACK LOCATION, UNSPECIFIED BACK PAIN LATERALITY, UNSPECIFIED CHRONICITY: ICD-10-CM

## 2022-10-06 DIAGNOSIS — M54.2 NECK PAIN: Primary | ICD-10-CM

## 2022-10-06 PROCEDURE — 73130 X-RAY EXAM OF HAND: CPT | Performed by: NURSE PRACTITIONER

## 2022-10-06 PROCEDURE — 72100 X-RAY EXAM L-S SPINE 2/3 VWS: CPT

## 2022-10-06 PROCEDURE — 72040 X-RAY EXAM NECK SPINE 2-3 VW: CPT

## 2022-10-08 ENCOUNTER — LAB (OUTPATIENT)
Dept: LAB | Facility: HOSPITAL | Age: 67
End: 2022-10-08

## 2022-10-08 LAB
ANION GAP SERPL CALCULATED.3IONS-SCNC: 11.7 MMOL/L (ref 5–15)
BUN SERPL-MCNC: 32 MG/DL (ref 8–23)
BUN/CREAT SERPL: 27.4 (ref 7–25)
CALCIUM SPEC-SCNC: 8.9 MG/DL (ref 8.6–10.5)
CHLORIDE SERPL-SCNC: 101 MMOL/L (ref 98–107)
CO2 SERPL-SCNC: 24.3 MMOL/L (ref 22–29)
CREAT SERPL-MCNC: 1.17 MG/DL (ref 0.57–1)
EGFRCR SERPLBLD CKD-EPI 2021: 51.9 ML/MIN/1.73
GLUCOSE SERPL-MCNC: 183 MG/DL (ref 65–99)
POTASSIUM SERPL-SCNC: 4 MMOL/L (ref 3.5–5.2)
SODIUM SERPL-SCNC: 137 MMOL/L (ref 136–145)

## 2022-10-08 PROCEDURE — 80048 BASIC METABOLIC PNL TOTAL CA: CPT | Performed by: INTERNAL MEDICINE

## 2022-10-08 PROCEDURE — 85730 THROMBOPLASTIN TIME PARTIAL: CPT | Performed by: INTERNAL MEDICINE

## 2022-10-08 PROCEDURE — 85025 COMPLETE CBC W/AUTO DIFF WBC: CPT | Performed by: INTERNAL MEDICINE

## 2022-10-08 PROCEDURE — 85610 PROTHROMBIN TIME: CPT | Performed by: INTERNAL MEDICINE

## 2022-10-12 ENCOUNTER — HOSPITAL ENCOUNTER (OUTPATIENT)
Facility: HOSPITAL | Age: 67
Setting detail: HOSPITAL OUTPATIENT SURGERY
Discharge: HOME | End: 2022-10-12
Attending: INTERNAL MEDICINE | Admitting: INTERNAL MEDICINE

## 2022-10-12 ENCOUNTER — HOSPITAL ENCOUNTER (OUTPATIENT)
Dept: CARDIOLOGY | Facility: HOSPITAL | Age: 67
Discharge: HOME | End: 2022-10-12

## 2022-10-12 VITALS
RESPIRATION RATE: 18 BRPM | BODY MASS INDEX: 42.17 KG/M2 | TEMPERATURE: 97.8 F | HEIGHT: 63 IN | DIASTOLIC BLOOD PRESSURE: 80 MMHG | HEART RATE: 65 BPM | WEIGHT: 238 LBS | OXYGEN SATURATION: 100 % | SYSTOLIC BLOOD PRESSURE: 168 MMHG

## 2022-10-12 DIAGNOSIS — R06.09 DYSPNEA ON EXERTION: ICD-10-CM

## 2022-10-12 DIAGNOSIS — R07.2 PRECORDIAL PAIN: ICD-10-CM

## 2022-10-12 DIAGNOSIS — I10 PRIMARY HYPERTENSION: ICD-10-CM

## 2022-10-12 DIAGNOSIS — R55 SYNCOPE AND COLLAPSE: ICD-10-CM

## 2022-10-12 LAB
BH CV XLRA MEAS LEFT DIST CCA EDV: -19.8 CM/SEC
BH CV XLRA MEAS LEFT DIST CCA PSV: -81.8 CM/SEC
BH CV XLRA MEAS LEFT DIST ICA EDV: -33.5 CM/SEC
BH CV XLRA MEAS LEFT DIST ICA PSV: -95.1 CM/SEC
BH CV XLRA MEAS LEFT ICA/CCA RATIO: 1.16
BH CV XLRA MEAS LEFT MID ICA EDV: -29.2 CM/SEC
BH CV XLRA MEAS LEFT MID ICA PSV: -81.4 CM/SEC
BH CV XLRA MEAS LEFT PROX CCA EDV: 26.1 CM/SEC
BH CV XLRA MEAS LEFT PROX CCA PSV: 100.6 CM/SEC
BH CV XLRA MEAS LEFT PROX ECA EDV: -17.4 CM/SEC
BH CV XLRA MEAS LEFT PROX ECA PSV: -95.7 CM/SEC
BH CV XLRA MEAS LEFT PROX ICA EDV: -21.1 CM/SEC
BH CV XLRA MEAS LEFT PROX ICA PSV: -84.5 CM/SEC
BH CV XLRA MEAS LEFT PROX SCLA PSV: 136 CM/SEC
BH CV XLRA MEAS LEFT VERTEBRAL A EDV: -17.6 CM/SEC
BH CV XLRA MEAS LEFT VERTEBRAL A PSV: -54.4 CM/SEC
BH CV XLRA MEAS RIGHT DIST CCA EDV: -15.7 CM/SEC
BH CV XLRA MEAS RIGHT DIST CCA PSV: -76.2 CM/SEC
BH CV XLRA MEAS RIGHT DIST ICA EDV: -35.1 CM/SEC
BH CV XLRA MEAS RIGHT DIST ICA PSV: -90 CM/SEC
BH CV XLRA MEAS RIGHT ICA/CCA RATIO: 1.18
BH CV XLRA MEAS RIGHT MID ICA EDV: -24.1 CM/SEC
BH CV XLRA MEAS RIGHT MID ICA PSV: -75.2 CM/SEC
BH CV XLRA MEAS RIGHT PROX CCA EDV: 12.4 CM/SEC
BH CV XLRA MEAS RIGHT PROX CCA PSV: 87.6 CM/SEC
BH CV XLRA MEAS RIGHT PROX ECA EDV: -13.7 CM/SEC
BH CV XLRA MEAS RIGHT PROX ECA PSV: -100 CM/SEC
BH CV XLRA MEAS RIGHT PROX ICA EDV: -21.6 CM/SEC
BH CV XLRA MEAS RIGHT PROX ICA PSV: -73.7 CM/SEC
BH CV XLRA MEAS RIGHT PROX SCLA PSV: 113.5 CM/SEC
BH CV XLRA MEAS RIGHT VERTEBRAL A EDV: 5.7 CM/SEC
BH CV XLRA MEAS RIGHT VERTEBRAL A PSV: 39.5 CM/SEC
GLUCOSE BLDC GLUCOMTR-MCNC: 119 MG/DL (ref 70–130)
LEFT ARM BP: NORMAL MMHG
MAXIMAL PREDICTED HEART RATE: 155 BPM
RIGHT ARM BP: NORMAL MMHG
STRESS TARGET HR: 132 BPM

## 2022-10-12 PROCEDURE — 0 IOPAMIDOL PER 1 ML: Performed by: INTERNAL MEDICINE

## 2022-10-12 PROCEDURE — 93880 EXTRACRANIAL BILAT STUDY: CPT

## 2022-10-12 PROCEDURE — C1769 GUIDE WIRE: HCPCS | Performed by: INTERNAL MEDICINE

## 2022-10-12 PROCEDURE — C1894 INTRO/SHEATH, NON-LASER: HCPCS | Performed by: INTERNAL MEDICINE

## 2022-10-12 PROCEDURE — 93458 L HRT ARTERY/VENTRICLE ANGIO: CPT | Performed by: INTERNAL MEDICINE

## 2022-10-12 PROCEDURE — 25010000002 MIDAZOLAM PER 1 MG: Performed by: INTERNAL MEDICINE

## 2022-10-12 PROCEDURE — 25010000002 HEPARIN (PORCINE) PER 1000 UNITS: Performed by: INTERNAL MEDICINE

## 2022-10-12 PROCEDURE — 82962 GLUCOSE BLOOD TEST: CPT

## 2022-10-12 PROCEDURE — 25010000002 FENTANYL CITRATE (PF) 50 MCG/ML SOLUTION: Performed by: INTERNAL MEDICINE

## 2022-10-12 RX ORDER — SODIUM CHLORIDE 0.9 % (FLUSH) 0.9 %
10 SYRINGE (ML) INJECTION EVERY 12 HOURS SCHEDULED
Status: DISCONTINUED | OUTPATIENT
Start: 2022-10-12 | End: 2022-10-12 | Stop reason: HOSPADM

## 2022-10-12 RX ORDER — ACETAMINOPHEN 325 MG/1
650 TABLET ORAL EVERY 4 HOURS PRN
Status: DISCONTINUED | OUTPATIENT
Start: 2022-10-12 | End: 2022-10-12 | Stop reason: HOSPADM

## 2022-10-12 RX ORDER — MIDAZOLAM HYDROCHLORIDE 1 MG/ML
INJECTION INTRAMUSCULAR; INTRAVENOUS
Status: DISCONTINUED | OUTPATIENT
Start: 2022-10-12 | End: 2022-10-12 | Stop reason: HOSPADM

## 2022-10-12 RX ORDER — FENTANYL CITRATE 50 UG/ML
INJECTION, SOLUTION INTRAMUSCULAR; INTRAVENOUS
Status: DISCONTINUED | OUTPATIENT
Start: 2022-10-12 | End: 2022-10-12 | Stop reason: HOSPADM

## 2022-10-12 RX ORDER — SODIUM CHLORIDE 0.9 % (FLUSH) 0.9 %
10 SYRINGE (ML) INJECTION AS NEEDED
Status: DISCONTINUED | OUTPATIENT
Start: 2022-10-12 | End: 2022-10-12 | Stop reason: HOSPADM

## 2022-10-12 RX ORDER — SODIUM CHLORIDE 9 MG/ML
75 INJECTION, SOLUTION INTRAVENOUS CONTINUOUS
Status: DISCONTINUED | OUTPATIENT
Start: 2022-10-12 | End: 2022-10-12 | Stop reason: HOSPADM

## 2022-10-12 RX ORDER — LIDOCAINE HYDROCHLORIDE 20 MG/ML
INJECTION, SOLUTION INFILTRATION; PERINEURAL
Status: DISCONTINUED | OUTPATIENT
Start: 2022-10-12 | End: 2022-10-12 | Stop reason: HOSPADM

## 2022-10-12 RX ADMIN — SODIUM CHLORIDE 75 ML/HR: 9 INJECTION, SOLUTION INTRAVENOUS at 10:04

## 2022-10-12 NOTE — DISCHARGE INSTRUCTIONS
[facility]  [address] [place] [address]    Coronary Angiogram (Radial/Ulnar Approach) After Care    Refer to this sheet in the next few weeks. These instructions provide you with information on caring for yourself after your procedure. Your caregiver may also give you more specific instructions. Your treatment has been planned according to current medical practices, but problems sometimes occur. Call your caregiver if you have any problems or questions after your procedure.    Home Care Instructions:  You may shower the day after the procedure. Remove the bandage (dressing) and gently wash the site with plain soap and water. Gently pat the site dry. You may apply a band aid daily for 2 days if desired.    Do not apply powder or lotion to the site.  Do not submerge the affected site in water for 3 to 5 days or until the site is completely healed.   Do not lift, push or pull anything over 5 pounds for 5 days after your procedure or as directed by your physician.  As a reference, a gallon of milk weighs 8 pounds.   Inspect the site at least twice daily. You may notice some bruising at the site and it may be tender for 1 to 2 weeks.     Increase your fluid intake for the next 2 days.    Keep arm elevated for 24 hours. For the remainder of the day, keep your arm in “Pledge of Allegiance” position when up and about.     You may drive 24 hours after the procedure unless otherwise instructed by your caregiver.  Do not operate machinery or power tools for 24 hours.  A responsible adult should be with you for the first 24 hours after you arrive home. Do not make any important legal decisions or sign legal papers for 24 hours.  Do not drink alcohol for 24 hours.    Metformin or any medications containing Metformin should not be taken for 48 hours after your procedure.      Call Your Doctor if:   You have unusual pain at the radial/ulnar (wrist) site.  You have redness, warmth, swelling, or pain at the  radial/ulnar (wrist) site.  You have drainage (other than a small amount of blood on the dressing).  `You have chills or a fever > 101.  Your arm becomes pale or dark, cool, tingly, or numb.  You develop chest pain, shortness of breath, feel faint or pass out.    You have heavy bleeding from the site, hold pressure on the site for 20 minutes.  If the bleeding stops, apply a fresh bandage and call your cardiologist.  However, if you        continue to have bleeding, call 911 and continue to apply pressure to the site.   You have any symptoms of a stroke.  Remember BE FAST  B-balance. Sudden trouble walking or loss of balance.  E-eyes.  Sudden changes in how you see or a sudden onset of a very bad headache.   F-face. Sudden weakness or loss of feeling of the face or facial droop on one side.   A-arms Sudden weakness or numbness in one arm.  One arm drifts down if they are both held out in front of you. This happens suddenly and usually on one side of the body.   S-speech.  Sudden trouble speaking, slurred speech or trouble understanding what are saying.   T-time  Time to call emergency services.  Write down the symptoms and the time they started.

## 2022-10-12 NOTE — H&P
1 MONTH FOLLOW UP, STILL EXPERIENCING SYMPTOMS.   Subjective:        Diane Gerardo is a 66 y.o. female who here for follow up    CC  STILL HAVE TIGHTNESS, FAINT, SOB  HPI  65-year-old female with history of the diabetes continues to complains of chest pains tightness in the chest as well as shortness of breath and dizziness     Problems Addressed this Visit        Cardiac and Vasculature    Precordial pain    Relevant Orders    Cardiac Catheterization/Vascular Study    Dyspnea on exertion    Relevant Orders    Cardiac Catheterization/Vascular Study    Primary hypertension    Relevant Orders    Cardiac Catheterization/Vascular Study   Diagnoses       Codes Comments    Precordial pain     ICD-10-CM: R07.2  ICD-9-CM: 786.51     Dyspnea on exertion     ICD-10-CM: R06.09  ICD-9-CM: 786.09     Primary hypertension     ICD-10-CM: I10  ICD-9-CM: 401.9         .    The following portions of the patient's history were reviewed and updated as appropriate: allergies, current medications, past family history, past medical history, past social history, past surgical history and problem list.    Past Medical History:   Diagnosis Date   • Anemia     history of   • Anxiety    • CHF (congestive heart failure) ([facility])    • Colon polyp    • DDD (degenerative disc disease), lumbosacral    • Depression    • Diabetes mellitus ([facility])     BORDERLINE   • Disease of thyroid gland     hypothyroid   • Elevated cholesterol    • Hypertension    • IBS (irritable bowel syndrome)    • Mitral valve prolapse    • Rheumatoid arthritis (HCC)    • Sleep apnea with use of continuous positive airway pressure (CPAP)     NO LONGER USES CPAP   • Ventral hernia     sched repair   • Ventral hernia     SCHEDULED FOR REPAIR     reports that she quit smoking about 14 years ago. Her smoking use included cigarettes. She has a 10.00 pack-year smoking history. She has never used smokeless tobacco. She reports current alcohol use. She reports that she does not use drugs.  "  Family History   Problem Relation Age of Onset   • Diabetes Mother    • COPD Mother    • Heart disease Father    • Cancer Father    • Prostate cancer Father    • Aneurysm Father    • Diabetes Brother    • Pancreatitis Brother    • Malig Hyperthermia Neg Hx    • Breast cancer Neg Hx        Review of Systems  Constitutional: No wt loss, fever, fatigue  Gastrointestinal: No nausea, abdominal pain  Behavioral/Psych: No insomnia or anxiety   Cardiovascular chest pains dizziness lightheadedness  Objective:       Physical Exam  /80   Pulse 71   Temp 97.8 °F (36.6 °C) (Temporal)   Resp 22   Ht 160 cm (63\")   Wt 108 kg (238 lb)   SpO2 100%   BMI 42.16 kg/m²   General appearance: No acute changes   Neck: Trachea midline; NECK, supple, no thyromegaly or lymphadenopathy   Lungs: Normal size and shape, normal breath sounds, equal distribution of air, no rales and rhonchi   CV: S1-S2 regular, no murmurs, no rub, no gallop   Abdomen: Soft, nontender; no masses , no abnormal abdominal sounds   Extremities: No deformity , normal color , no peripheral edema   Skin: Normal temperature, turgor and texture; no rash, ulcers          Procedures      Echocardiogram:        Current Facility-Administered Medications:   •  acetaminophen (TYLENOL) tablet 650 mg, 650 mg, Oral, Q4H PRN, Madan Hu MD  •  sodium chloride 0.9 % flush 10 mL, 10 mL, Intravenous, Q12H, Madan Hu MD  •  sodium chloride 0.9 % flush 10 mL, 10 mL, Intravenous, PRN, Madan Hu MD  •  sodium chloride 0.9 % infusion, 75 mL/hr, Intravenous, Continuous, Madan Hu MD, Last Rate: 75 mL/hr at 10/12/22 1004, 75 mL/hr at 10/12/22 1004   Assessment:        Patient Active Problem List   Diagnosis   • Osteoarthritis of both knees   • Anemia of chronic disorder   • Arthralgia of multiple joints   • Chronic depression   • Type 2 diabetes mellitus (HCC)   • Steatosis of liver   • Hypothyroidism due to Hashimoto's " thyroiditis   • Lactose intolerance   • Non-toxic multinodular goiter   • Obstructive sleep apnea syndrome   • Hypothyroidism   • Rheumatoid arthritis involving multiple joints (HCC)   • Snoring   • Vitamin D deficiency   • Degeneration of intervertebral disc of lumbosacral region   • History of colonic polyps   • Hyperlipidemia   • Morbid obesity (HCC)   • Primary osteoarthritis of both knees   • Seasonal allergic rhinitis   • Wears eyeglasses   • Gastric polyps   • Internal hemorrhoids   • Gastritis   • Precordial pain   • Morbid obesity with BMI of 40.0-44.9, adult (HCC)   • Dyspnea on exertion   • Primary hypertension   • RUQ pain   • History of gastritis   • History of gastric polyp               Plan:            ICD-10-CM ICD-9-CM   1. Precordial pain  R07.2 786.51   2. Dyspnea on exertion  R06.00 786.09   3. Primary hypertension  I10 401.9     1. Precordial pain  Needs further evaluation    2. Dyspnea on exertion  Needs further evaluation    3. Primary hypertension  Blood pressure controlled       US CAROTIDS    Procedure, risks and options of cardiac cath explained to pt INCLUDING BUT NOT LIMITED TO MI, STROKE, DEATH, INFECTION HAEMORRHAGE, . Pt understands well and agrees with no further questions.    COUNSELING:    Diane Gudino was given to patient for the following topics: diagnostic results, risk factor reductions, impressions, risks and benefits of treatment options and importance of treatment compliance .       SMOKING COUNSELING:    [unfilled]    Dictated using Dragon dictation

## 2022-10-25 ENCOUNTER — OFFICE VISIT (OUTPATIENT)
Dept: CARDIOLOGY | Facility: CLINIC | Age: 67
End: 2022-10-25

## 2022-10-25 VITALS
HEIGHT: 63 IN | HEART RATE: 66 BPM | WEIGHT: 240 LBS | BODY MASS INDEX: 42.52 KG/M2 | SYSTOLIC BLOOD PRESSURE: 146 MMHG | DIASTOLIC BLOOD PRESSURE: 77 MMHG

## 2022-10-25 DIAGNOSIS — R07.2 PRECORDIAL PAIN: Primary | ICD-10-CM

## 2022-10-25 DIAGNOSIS — R06.09 DYSPNEA ON EXERTION: ICD-10-CM

## 2022-10-25 DIAGNOSIS — I10 PRIMARY HYPERTENSION: ICD-10-CM

## 2022-10-25 PROCEDURE — 99213 OFFICE O/P EST LOW 20 MIN: CPT | Performed by: INTERNAL MEDICINE

## 2022-10-25 NOTE — PROGRESS NOTES
CATH FOLLOW UP   Subjective:        Diane Gerardo is a 67 y.o. female who here for follow up    CC  POST CATH  HPI  67 years old female with atypical chest pain shortness of breath hypertension here for the follow-up after the recent heart catheterization which was normal     Problems Addressed this Visit        Cardiac and Vasculature    Precordial pain - Primary    Dyspnea on exertion    Primary hypertension   Diagnoses       Codes Comments    Precordial pain    -  Primary ICD-10-CM: R07.2  ICD-9-CM: 786.51     Dyspnea on exertion     ICD-10-CM: R06.09  ICD-9-CM: 786.09     Primary hypertension     ICD-10-CM: I10  ICD-9-CM: 401.9         .    The following portions of the patient's history were reviewed and updated as appropriate: allergies, current medications, past family history, past medical history, past social history, past surgical history and problem list.    Past Medical History:   Diagnosis Date   • Anemia     history of   • Anxiety    • CHF (congestive heart failure) ([facility])    • Colon polyp    • DDD (degenerative disc disease), lumbosacral    • Depression    • Diabetes mellitus ([facility])     BORDERLINE   • Disease of thyroid gland     hypothyroid   • Elevated cholesterol    • Hypertension    • IBS (irritable bowel syndrome)    • Mitral valve prolapse    • Rheumatoid arthritis ([facility])    • Sleep apnea with use of continuous positive airway pressure (CPAP)     NO LONGER USES CPAP   • Ventral hernia     sched repair   • Ventral hernia     SCHEDULED FOR REPAIR     reports that she quit smoking about 14 years ago. Her smoking use included cigarettes. She has a 10.00 pack-year smoking history. She has never used smokeless tobacco. She reports current alcohol use. She reports that she does not use drugs.   Family History   Problem Relation Age of Onset   • Diabetes Mother    • COPD Mother    • Heart disease Father    • Cancer Father    • Prostate cancer Father    • Aneurysm Father    • Diabetes Brother    •  "Pancreatitis Brother    • Malig Hyperthermia Neg Hx    • Breast cancer Neg Hx        Review of Systems  Constitutional: No wt loss, fever, fatigue  Gastrointestinal: No nausea, abdominal pain  Behavioral/Psych: No insomnia or anxiety   Cardiovascular no chest pains or tightness in the chest  Objective:       Physical Exam  /77   Pulse 66   Ht 160 cm (63\")   Wt 109 kg (240 lb)   BMI 42.51 kg/m²   General appearance: No acute changes   Neck: Trachea midline; NECK, supple, no thyromegaly or lymphadenopathy   Lungs: Normal size and shape, normal breath sounds, equal distribution of air, no rales and rhonchi   CV: S1-S2 regular, no murmurs, no rub, no gallop   Abdomen: Soft, nontender; no masses , no abnormal abdominal sounds   Extremities: No deformity , normal color , no peripheral edema   Skin: Normal temperature, turgor and texture; no rash, ulcers          Procedures      Echocardiogram:        Current Outpatient Medications:   •  hydrochlorothiazide (HYDRODIURIL) 25 MG tablet, Take 12.5 mg by mouth Every Morning., Disp: , Rfl:   •  levothyroxine (SYNTHROID, LEVOTHROID) 150 MCG tablet, Take 150 mcg by mouth Every Morning., Disp: , Rfl:   •  lisinopril (PRINIVIL,ZESTRIL) 10 MG tablet, Take 10 mg by mouth Daily., Disp: , Rfl: 3  •  metFORMIN (GLUCOPHAGE) 1000 MG tablet, Take 1 tablet by mouth Every Evening., Disp: , Rfl:   •  Monovisc 88 MG/4ML solution prefilled syringe injection, Every 6 (Six) Months., Disp: , Rfl:   •  multivitamin with minerals tablet tablet, Take 1 tablet by mouth Daily., Disp: , Rfl:    Assessment:        Patient Active Problem List   Diagnosis   • Osteoarthritis of both knees   • Anemia of chronic disorder   • Arthralgia of multiple joints   • Chronic depression   • Type 2 diabetes mellitus (HCC)   • Steatosis of liver   • Hypothyroidism due to Hashimoto's thyroiditis   • Lactose intolerance   • Non-toxic multinodular goiter   • Obstructive sleep apnea syndrome   • Hypothyroidism   • " Rheumatoid arthritis involving multiple joints (HCC)   • Snoring   • Vitamin D deficiency   • Degeneration of intervertebral disc of lumbosacral region   • History of colonic polyps   • Hyperlipidemia   • Morbid obesity (HCC)   • Primary osteoarthritis of both knees   • Seasonal allergic rhinitis   • Wears eyeglasses   • Gastric polyps   • Internal hemorrhoids   • Gastritis   • Precordial pain   • Morbid obesity with BMI of 40.0-44.9, adult (HCC)   • Dyspnea on exertion   • Primary hypertension   • RUQ pain   • History of gastritis   • History of gastric polyp     Interpretation Summary       •  Proximal right internal carotid artery is normal.  •  Proximal left internal carotid artery mild stenosis            Plan:            ICD-10-CM ICD-9-CM   1. Precordial pain  R07.2 786.51   2. Dyspnea on exertion  R06.09 786.09   3. Primary hypertension  I10 401.9     1. Precordial pain  Atypical    2. Dyspnea on exertion  Multifactorial    3. Primary hypertension  Blood pressure under control       Diane Gerardo here for the follow-up post heart catheter, being treated medically.Diane Gerardo has no complications.  Access site has been examined shows no complications.  Patient has been advised to contact us with any further issues and questions related to the heart catheter    1 YR  COUNSELING:    Diane GerardoCounseling was given to patient for the following topics: diagnostic results, risk factor reductions, impressions, risks and benefits of treatment options and importance of treatment compliance .       SMOKING COUNSELING:        Dictated using Dragon dictation

## 2022-10-26 ENCOUNTER — TRANSCRIBE ORDERS (OUTPATIENT)
Dept: ADMINISTRATIVE | Facility: HOSPITAL | Age: 67
End: 2022-10-26

## 2022-10-26 DIAGNOSIS — M81.0 AGE-RELATED OSTEOPOROSIS WITHOUT CURRENT PATHOLOGICAL FRACTURE: Primary | ICD-10-CM

## 2022-11-03 ENCOUNTER — HOSPITAL ENCOUNTER (OUTPATIENT)
Dept: PHYSICAL THERAPY | Facility: HOSPITAL | Age: 67
Setting detail: THERAPIES SERIES
Discharge: HOME | End: 2022-11-03

## 2022-11-03 DIAGNOSIS — M47.812 CERVICAL SPONDYLOSIS: Primary | ICD-10-CM

## 2022-11-03 DIAGNOSIS — M47.816 LUMBAR SPONDYLOSIS: ICD-10-CM

## 2022-11-03 PROCEDURE — 97161 PT EVAL LOW COMPLEX 20 MIN: CPT

## 2022-11-03 NOTE — THERAPY EVALUATION
Outpatient Physical Therapy Ortho Initial Evaluation  PAOLA Miller     Patient Name: Diane Gerardo  : 1955  MRN: [id number]  Today's Date: 11/3/2022      Visit Date: 2022    Patient Active Problem List   Diagnosis   • Osteoarthritis of both knees   • Anemia of chronic disorder   • Arthralgia of multiple joints   • Chronic depression   • Type 2 diabetes mellitus (HCC)   • Steatosis of liver   • Hypothyroidism due to Hashimoto's thyroiditis   • Lactose intolerance   • Non-toxic multinodular goiter   • Obstructive sleep apnea syndrome   • Hypothyroidism   • Rheumatoid arthritis involving multiple joints (HCC)   • Snoring   • Vitamin D deficiency   • Degeneration of intervertebral disc of lumbosacral region   • History of colonic polyps   • Hyperlipidemia   • Morbid obesity (HCC)   • Primary osteoarthritis of both knees   • Seasonal allergic rhinitis   • Wears eyeglasses   • Gastric polyps   • Internal hemorrhoids   • Gastritis   • Precordial pain   • Morbid obesity with BMI of 40.0-44.9, adult (HCC)   • Dyspnea on exertion   • Primary hypertension   • RUQ pain   • History of gastritis   • History of gastric polyp        Past Medical History:   Diagnosis Date   • Anemia     history of   • Anxiety    • CHF (congestive heart failure) (HCC)    • Colon polyp    • DDD (degenerative disc disease), lumbosacral    • Depression    • Diabetes mellitus (HCC)     BORDERLINE   • Disease of thyroid gland     hypothyroid   • Elevated cholesterol    • Hypertension    • IBS (irritable bowel syndrome)    • Mitral valve prolapse    • Rheumatoid arthritis (HCC)    • Sleep apnea with use of continuous positive airway pressure (CPAP)     NO LONGER USES CPAP   • Ventral hernia     sched repair   • Ventral hernia     SCHEDULED FOR REPAIR        Past Surgical History:   Procedure Laterality Date   • ANTERIOR CERVICAL FUSION      C4C5   • CARDIAC CATHETERIZATION N/A 10/12/2022    Procedure: Left Heart Cath;  Surgeon:  Madan Hu MD;  Location:  [facility];  Service: Cardiology;  Laterality: N/A;   • CARDIAC CATHETERIZATION N/A 10/12/2022    Procedure: Coronary angiography;  Surgeon: Madan Hu MD;  Location:  [facility];  Service: Cardiology;  Laterality: N/A;   • CHOLECYSTECTOMY      LAP   • COLONOSCOPY N/A 2/8/2017    Procedure: COLONOSCOPY;  Surgeon: Domi Juarez MD;  Location: [facility];  Service:    • COLONOSCOPY W/ POLYPECTOMY N/A 4/26/2022    Procedure: COLONOSCOPY with polypectomy;  Surgeon: Erin Fried DO;  Location: [facility];  Service: Gastroenterology;  Laterality: N/A;  right colon polyp  rectal polyps x3   • ENDOSCOPY N/A 2/8/2017    Procedure: ESOPHAGOGASTRODUODENOSCOPY;  Surgeon: Domi Juarez MD;  Location: [facility];  Service:    • ENDOSCOPY N/A 7/21/2020    Procedure: ESOPHAGOGASTRODUODENOSCOPY with nirmal test;  Surgeon: Erin Fried DO;  Location: [facility];  Service: Gastroenterology;  Laterality: N/A;  normal exam   • HYSTERECTOMY      partial 1984, 2002 complete   • LAPAROSCOPIC LYSIS OF ADHESIONS  2002    and cyst removal   • OVARIAN CYST REMOVAL      laparoscopic x2 last 2006   • VENTRAL/INCISIONAL HERNIA REPAIR N/A 8/2/2018    Procedure: VENTRAL AND SPIGELIAN HERNIA REPAIR LAPAROSCOPIC;  Surgeon: Erin Fried DO;  Location: [facility];  Service: General   • VENTRAL/INCISIONAL HERNIA REPAIR N/A 6/6/2019    Procedure: VENTRAL/INCISIONAL HERNIA REPAIR;  Surgeon: Erin Fried DO;  Location: [facility];  Service: General       Visit Dx:     ICD-10-CM ICD-9-CM   1. Cervical spondylosis  M47.812 721.0   2. Lumbar spondylosis  M47.816 721.3          Patient History     Row Name 11/03/22 0900 11/01/22 0858          History    Chief Complaint Difficulty Walking;Difficulty with daily activities;Dizziness;Fatigue/poor endurance;Headache;Joint stiffness;Joint swelling;Numbness;Pain;Tinglings  -AS Difficulty Walking;Difficulty  with daily activities;Dizziness;Fatigue/poor endurance;Headache;Joint stiffness;Joint swelling;Numbness;Pain;Tinglings (P)    -patient     Type of Pain Back pain;Elbow pain;Foot pain;Hand pain;Hip pain;Knee pain;Lower Extremity / Leg;Neck pain;Shoulder pain  -AS Back pain;Elbow pain;Foot pain;Hand pain;Hip pain;Knee pain;Lower Extremity / Leg;Neck pain;Shoulder pain (P)    -patient     Brief Description of Current Complaint Constant pain in lower back, dizziness when turning my neck.  -AS Constant pain in lower back, dizziness when turning my neck. (P)    -patient     Patient/Caregiver Goals Relieve pain;Improve mobility;Relief from dizziness;Know what to do to help the symptoms  -AS Relieve pain;Improve mobility;Relief from dizziness;Know what to do to help the symptoms (P)    -patient     Hand Dominance left-handed  -AS left-handed (P)    -patient     Occupation/sports/leisure activities Swimming in the summer, and play billards.  -AS Swimming in the summer, and play billards. (P)    -patient     Patient seeing anyone else for problem(s)? Doctors.  -AS Doctors. (P)    -patient     What clinical tests have you had for this problem? X-ray;CT scan  -AS X-ray;CT scan (P)    -patient     Results of Clinical Tests Cervical and Lumbar Spondylosis  -AS --     Are you or can you be pregnant No  -AS No (P)    -patient        Pain     Pain Location Back;Neck  -AS --     Pain at Present 7  -AS --     Pain at Best 2  -AS --     Pain at Worst 10  -AS --     Pain Frequency Constant/continuous  -AS --     Pain Description Aching  -AS --     What Performance Factors Make the Current Problem(s) WORSE? Activity  -AS --     What Performance Factors Make the Current Problem(s) BETTER? Rest  -AS --        Fall Risk Assessment    Any falls in the past year: No  -AS No (P)    -patient        Services    Prior Rehab/Home Health Experiences No  -AS No (P)    -patient        Daily Activities    Primary Language English  -AS English (P)     -patient     Are you able to read Yes  -AS Yes (P)    -patient     Are you able to write Yes  -AS Yes (P)    -patient     How does patient learn best? Listening  -AS Listening (P)    -patient     Teaching needs identified Home Exercise Program;Management of Condition  -AS --     Patient is concerned about/has problems with Flexibility;Performing home management (household chores, shopping, care of dependents);Performing job responsibilities/community activities (work, school,;Performing sports, recreation, and play activities  -AS --     Does patient have problems with the following? Depression  -AS --     Barriers to learning None  -AS --     Pt Participated in POC and Goals Yes  -AS --        Safety    Are you being hurt, hit, or frightened by anyone at home or in your life? Unspecified  -AS Unspecified (P)    -patient     Are you being neglected by a caregiver No  -AS No (P)    -patient     Have you had any of the following issues with Depression  -AS Depression (P)    -patient           User Key  (r) = Recorded By, (t) = Taken By, (c) = Cosigned By    Initials Name Provider Type    AS Tim Tracey, PT Physical Therapist    patient Diane Gerardo --                 PT Ortho     Row Name 11/03/22 0900       Precautions and Contraindications    Precautions/Limitations no known precautions/limitations  -AS       Subjective Pain    Able to rate subjective pain? yes  -AS    Pre-Treatment Pain Level 7  -AS    Post-Treatment Pain Level 6  -AS       Posture/Observations    Posture- WNL Posture is WNL  -AS       Head/Neck/Trunk    Neck Extension AROM 32  -AS    Neck Flexion AROM 37  -AS    Neck Lt Lateral Flexion AROM 30  -AS    Neck Rt Lateral Flexion AROM 22  -AS    Neck Lt Rotation AROM 60  -AS    Neck Rt Rotation AROM 40  -AS    Trunk Extension AROM 25% limited  -AS    Trunk Flexion AROM 25% limited  -AS    Trunk Lt Lateral Flexion AROM 25% limited  -AS    Trunk Rt Lateral Flexion AROM 25% limited  -AS     Trunk Lt Rotation AROM 25% limited  -AS    Trunk Rt Rotation AROM 25% limited  -AS       Right Lower Ext    Rt Hip ABduction AROM WNL  -AS    Rt Hip Extension AROM WNL  -AS    Rt Hip Flexion AROM WNL  -AS       Left Lower Ext    Lt Hip ABduction AROM WNL  -AS    Lt Hip Extension AROM WNL  -AS    Lt Hip Flexion AROM WNL  -AS       MMT Neck/Trunk    Neck Flexion MMT, Gross Movement (3+/5) fair plus  -AS    Neck Extension MMT, Gross Movement (3+/5) fair plus  -AS    Trunk Flexion MMT, Gross Movement (3+/5) fair plus  -AS    Trunk Extension MMT, Gross Movement (3+/5) fair plus  -AS       MMT Right Lower Ext    Rt Hip Flexion MMT, Gross Movement (4-/5) good minus  -AS    Rt Hip Extension MMT, Gross Movement (4-/5) good minus  -AS    Rt Hip ABduction MMT, Gross Movement (4-/5) good minus  -AS       MMT Left Lower Ext    Lt Hip Flexion MMT, Gross Movement (4-/5) good minus  -AS    Lt Hip Extension MMT, Gross Movement (4-/5) good minus  -AS    Lt Hip ABduction MMT, Gross Movement (4-/5) good minus  -AS       Sensation    Sensation WNL? WNL  -AS    Light Touch No apparent deficits  -AS       Upper Extremity Flexibility    Suboccipitals Bilateral:;Moderately limited  -AS    Upper Trapezius Bilateral:;Moderately limited  -AS    Levator Scapula Bilateral:;Moderately limited  -AS       Lower Extremity Flexibility    Hamstrings Bilateral:;Moderately limited  -AS    Hip External Rotators Bilateral:;Moderately limited  -AS          User Key  (r) = Recorded By, (t) = Taken By, (c) = Cosigned By    Initials Name Provider Type    AS Tim Tracey, PT Physical Therapist                            Therapy Education  Given: HEP, Symptoms/condition management, Pain management  Program: New  How Provided: Verbal, Demonstration, Written  Provided to: Patient  Level of Understanding: Teach back education performed, Verbalized, Demonstrated      PT OP Goals     Row Name 11/03/22 0900          PT Short Term Goals    STG Date to Achieve  11/17/22  -AS     STG 1 Patient to demonstrate compliance with her initial HEP for flexibility, ROM and strengthening.  -AS     STG 2 Patient to report low back and neck pain on VAS of 4-5/10 at worst with activity.  -AS     STG 3 Patient to demonstrate improved trunk, LE, and UE strength to 4/5 in all planes.  -AS        Long Term Goals    LTG Date to Achieve 12/01/22  -AS     LTG 1 Patient to demonstrate compliance with her advanced HEP for flexibility, ROM and strengthening.  -AS     LTG 2 Patient to report low back and neck pain on VAS of 1-2/10 at worst with activity.  -AS     LTG 3 Patient to demonstrate improved trunk, LE, and UE strength to 4+/5 in all planes.  -AS     LTG 4 Patient to demonstrate trunk and neck ROM to WNL in all planes.  -AS     LTG 5 Patient to report improved function on Back Index by 10-15 points.  -AS     LTG 6 Patient to report improved function on NDI by 10-15 points.  -AS        Time Calculation    PT Goal Re-Cert Due Date 12/01/22  -AS           User Key  (r) = Recorded By, (t) = Taken By, (c) = Cosigned By    Initials Name Provider Type    AS Tim Tracey, PT Physical Therapist                 PT Assessment/Plan     Row Name 11/03/22 0900          PT Assessment    Functional Limitations Limitation in home management;Limitations in community activities;Performance in leisure activities;Performance in sport activities;Performance in work activities  -AS     Impairments Impaired flexibility;Joint mobility;Muscle strength;Pain;Range of motion  -AS     Assessment Comments Patient presents to outpatient PT with reports of chronic neck and lumbar pain. Patient has been diagnosed with cervical and lumbar spondylosis. Patient is here for gentle stretching, gentle strengthening, and a gentle HEP, as well as some modalities. Patient has limited cervical and lumbar ROM, limited LE and UE strength, and increased pain with activity. Patient has limited function at this time secondary to  the above.  -AS     Please refer to paper survey for additional self-reported information Yes  -AS     Rehab Potential Good  -AS     Patient/caregiver participated in establishment of treatment plan and goals Yes  -AS     Patient would benefit from skilled therapy intervention Yes  -AS        PT Plan    PT Frequency 2x/week  -AS     Predicted Duration of Therapy Intervention (PT) 4 weeks  -AS     Planned CPT's? PT RE-EVAL: 09524;PT THER PROC EA 15 MIN: 80523;PT THER ACT EA 15 MIN: 73020;PT MANUAL THERAPY EA 15 MIN: 10487;PT NEUROMUSC RE-EDUCATION EA 15 MIN: 67057  -AS           User Key  (r) = Recorded By, (t) = Taken By, (c) = Cosigned By    Initials Name Provider Type    AS Tim Tracey, PT Physical Therapist                 Modalities     Row Name 11/03/22 0900             Moist Heat    MH Applied Yes  -AS      Location Neck/Low Back - Sitting In Chair  -AS      PT Moist Heat Minutes 10  -AS      MH Prior to Rx Yes  -AS            User Key  (r) = Recorded By, (t) = Taken By, (c) = Cosigned By    Initials Name Provider Type    AS Tim Tracey, PT Physical Therapist               OP Exercises     Row Name 11/03/22 0900             Subjective Pain    Able to rate subjective pain? yes  -AS      Pre-Treatment Pain Level 7  -AS      Post-Treatment Pain Level 6  -AS         Exercise 1    Exercise Name 1 Gab. HS Stretch  -AS      Reps 1 10  -AS      Time 1 10 sec hold each  -AS         Exercise 2    Exercise Name 2 Gab. Piriformis Stretch  -AS      Reps 2 10  -AS      Time 2 10 sec hold each  -AS         Exercise 3    Exercise Name 3 Lower Cervical/Upper Thoracic Stretch  -AS      Reps 3 10  -AS      Time 3 10 sec hold each  -AS         Exercise 4    Exercise Name 4 Scapular Squeezes  -AS      Reps 4 15  -AS      Time 4 5 sec hold each  -AS         Exercise 5    Exercise Name 5 Rows  -AS         Exercise 6    Exercise Name 6 Extensions  -AS         Exercise 10    Exercise Name 10 Gab. HS Stretch  -AS       Reps 10 10  -AS      Time 10 10 sec hold each  -AS         Exercise 11    Exercise Name 11 Gab. Piriformis Stretch  -AS      Reps 11 10  -AS      Time 11 10 sec hold each  -AS         Exercise 12    Exercise Name 12 Lumbar Rotations  -AS      Reps 12 15 each side  -AS      Time 12 no hold  -AS         Exercise 13    Exercise Name 13 Hip ADD vs Ball  -AS      Reps 13 15  -AS      Time 13 5 sec hold each  -AS         Exercise 14    Exercise Name 14 Supine Clams  -AS         Exercise 15    Exercise Name 15 Bridge vs Band  -AS         Exercise 16    Exercise Name 16 Standing hip ABD & EXT - Gab.  -AS            User Key  (r) = Recorded By, (t) = Taken By, (c) = Cosigned By    Initials Name Provider Type    AS Tim Tracey, PT Physical Therapist                              Outcome Measure Options: Other Outcome Measure  Neck Disability Index  Section 1 - Pain Intensity: The pain comes and goes and is moderate.  Section 2 - Personal Care: I can look after myself normally, but it causes extra pain.  Section 3 - Lifting: I can lift very light weights.  Section 4 - Work: I can only do my usual work, but no more  Section 5 - Headaches: I have slight headaches that come infrequently.  Section 6 - Concentration: I can concentrate fully when I want to with slight difficulty.  Section 7 - Sleeping: My sleep is mildly disturbed (1-2 hours sleepless).  Section 8 - Driving: I can drive as long as I want with moderate neck pain.  Section 9 - Reading: I can read as much as I want with slight neck pain.  Section 10 - Recreation: I am able to engage in most but not all recreational activities because of pain in my neck.  Neck Disability Index Score: 17  Other Outcome Measure Tool Used  Other Outcome Measure Tool Comments: Back Index - 32      Time Calculation:     Start Time: 0921  Stop Time: 1036  Time Calculation (min): 75 min  Untimed Charges  PT Moist Heat Minutes: 10  Total Minutes  Untimed Charges Total Minutes: 10    Total Minutes: 10     Therapy Charges for Today     Code Description Service Date Service Provider Modifiers Qty    39701097639 HC PT EVAL LOW COMPLEXITY 4 11/3/2022 Tim Tracey, PT GP 1          PT G-Endy  Outcome Measure Options: Other Outcome Measure  Neck Disability Index Score: 17         Tim Tracey, PT  11/3/2022

## 2022-11-07 ENCOUNTER — APPOINTMENT (OUTPATIENT)
Dept: BONE DENSITY | Facility: HOSPITAL | Age: 67
End: 2022-11-07

## 2022-11-07 DIAGNOSIS — M81.0 AGE-RELATED OSTEOPOROSIS WITHOUT CURRENT PATHOLOGICAL FRACTURE: ICD-10-CM

## 2022-11-07 PROCEDURE — 77080 DXA BONE DENSITY AXIAL: CPT

## 2023-01-06 ENCOUNTER — TELEPHONE (OUTPATIENT)
Dept: ORTHOPEDIC SURGERY | Facility: CLINIC | Age: 68
End: 2023-01-06

## 2023-01-06 NOTE — TELEPHONE ENCOUNTER
Caller: RAMIRO OLMSTEAD    Relationship to patient: SELF    Best call back number:  [phone]    Chief complaint: REQUESTING BILATERAL KNEE INJECTIONS    Type of visit: F/U/INJECTIONS

## 2023-01-06 NOTE — TELEPHONE ENCOUNTER
Caller: RAMIRO     Relationship to patient: SELF     Best call back number: [phone]    Type of visit: MONOVISC INJECTION

## 2023-01-20 ENCOUNTER — TRANSCRIBE ORDERS (OUTPATIENT)
Dept: ADMINISTRATIVE | Facility: HOSPITAL | Age: 68
End: 2023-01-20
Payer: MEDICARE

## 2023-01-20 DIAGNOSIS — E04.1 NONTOXIC SINGLE THYROID NODULE: Primary | ICD-10-CM

## 2023-02-07 ENCOUNTER — HOSPITAL ENCOUNTER (OUTPATIENT)
Dept: ULTRASOUND IMAGING | Facility: HOSPITAL | Age: 68
Discharge: HOME | End: 2023-02-07
Admitting: STUDENT IN AN ORGANIZED HEALTH CARE EDUCATION/TRAINING PROGRAM
Payer: MEDICARE

## 2023-02-07 DIAGNOSIS — E04.1 NONTOXIC SINGLE THYROID NODULE: ICD-10-CM

## 2023-02-07 PROCEDURE — 76536 US EXAM OF HEAD AND NECK: CPT

## 2023-02-14 ENCOUNTER — CLINICAL SUPPORT (OUTPATIENT)
Dept: ORTHOPEDIC SURGERY | Facility: CLINIC | Age: 68
End: 2023-02-14
Payer: MEDICARE

## 2023-02-14 VITALS — WEIGHT: 240 LBS | BODY MASS INDEX: 42.52 KG/M2 | HEIGHT: 63 IN

## 2023-02-14 DIAGNOSIS — G89.29 CHRONIC PAIN OF BOTH KNEES: Primary | ICD-10-CM

## 2023-02-14 DIAGNOSIS — M17.0 PRIMARY OSTEOARTHRITIS OF BOTH KNEES: ICD-10-CM

## 2023-02-14 DIAGNOSIS — M25.562 CHRONIC PAIN OF BOTH KNEES: Primary | ICD-10-CM

## 2023-02-14 DIAGNOSIS — M25.561 CHRONIC PAIN OF BOTH KNEES: Primary | ICD-10-CM

## 2023-02-14 PROCEDURE — 20610 DRAIN/INJ JOINT/BURSA W/O US: CPT | Performed by: ORTHOPAEDIC SURGERY

## 2023-02-14 RX ORDER — ATORVASTATIN CALCIUM 40 MG/1
TABLET, FILM COATED ORAL
COMMUNITY
Start: 2023-02-09

## 2023-02-14 NOTE — PROGRESS NOTES
Subjective: Osteoarthritis both knees     Patient ID: Diane Gerardo is a 67 y.o. female.    Chief Complaint:    History of Present Illness patient seen for Durolane injections in both knees.  The last injections given in August lasted until just recently.       Social History     Occupational History   • Not on file   Tobacco Use   • Smoking status: Former     Packs/day: 1.00     Years: 10.00     Pack years: 10.00     Types: Cigarettes     Quit date: 2008     Years since quitting: 15.1   • Smokeless tobacco: Never   Vaping Use   • Vaping Use: Never used   Substance and Sexual Activity   • Alcohol use: Yes     Comment: occasional   • Drug use: No   • Sexual activity: Defer      Review of Systems      Past Medical History:   Diagnosis Date   • Anemia     history of   • Anxiety    • CHF (congestive heart failure) ([facility])    • Colon polyp    • DDD (degenerative disc disease), lumbosacral    • Depression    • Diabetes mellitus (HCC)     BORDERLINE   • Disease of thyroid gland     hypothyroid   • Elevated cholesterol    • Hypertension    • IBS (irritable bowel syndrome)    • Mitral valve prolapse    • Rheumatoid arthritis (HCC)    • Sleep apnea with use of continuous positive airway pressure (CPAP)     NO LONGER USES CPAP   • Ventral hernia     sched repair   • Ventral hernia     SCHEDULED FOR REPAIR     Past Surgical History:   Procedure Laterality Date   • ANTERIOR CERVICAL FUSION  2011    C4C5   • CARDIAC CATHETERIZATION N/A 10/12/2022    Procedure: Left Heart Cath;  Surgeon: Madan Hu MD;  Location:  [facility];  Service: Cardiology;  Laterality: N/A;   • CARDIAC CATHETERIZATION N/A 10/12/2022    Procedure: Coronary angiography;  Surgeon: Madan Hu MD;  Location:  [facility];  Service: Cardiology;  Laterality: N/A;   • CHOLECYSTECTOMY      LAP   • COLONOSCOPY N/A 2/8/2017    Procedure: COLONOSCOPY;  Surgeon: Domi Juarez MD;  Location: [facility] OR;  Service:     • COLONOSCOPY W/ POLYPECTOMY N/A 4/26/2022    Procedure: COLONOSCOPY with polypectomy;  Surgeon: Erin Fried DO;  Location:  [facility] OR;  Service: Gastroenterology;  Laterality: N/A;  right colon polyp  rectal polyps x3   • ENDOSCOPY N/A 2/8/2017    Procedure: ESOPHAGOGASTRODUODENOSCOPY;  Surgeon: Domi Juarez MD;  Location: [facility] OR;  Service:    • ENDOSCOPY N/A 7/21/2020    Procedure: ESOPHAGOGASTRODUODENOSCOPY with nirmal test;  Surgeon: Erin Fried DO;  Location:  [facility] OR;  Service: Gastroenterology;  Laterality: N/A;  normal exam   • HYSTERECTOMY      partial 1984, 2002 complete   • LAPAROSCOPIC LYSIS OF ADHESIONS  2002    and cyst removal   • OVARIAN CYST REMOVAL      laparoscopic x2 last 2006   • VENTRAL/INCISIONAL HERNIA REPAIR N/A 8/2/2018    Procedure: VENTRAL AND SPIGELIAN HERNIA REPAIR LAPAROSCOPIC;  Surgeon: Erin Fried DO;  Location:  [facility] OR;  Service: General   • VENTRAL/INCISIONAL HERNIA REPAIR N/A 6/6/2019    Procedure: VENTRAL/INCISIONAL HERNIA REPAIR;  Surgeon: Erin Fried DO;  Location:  [facility] OR;  Service: General     Family History   Problem Relation Age of Onset   • Diabetes Mother    • COPD Mother    • Heart disease Father    • Cancer Father    • Prostate cancer Father    • Aneurysm Father    • Diabetes Brother    • Pancreatitis Brother    • Malig Hyperthermia Neg Hx    • Breast cancer Neg Hx          Objective:  There were no vitals filed for this visit.      02/14/23  1133   Weight: 109 kg (240 lb)     Body mass index is 42.51 kg/m².        Ortho Exam   Patient is alert and oriented x3.  Neither knee has any swelling effusion erythema.  Both knees therefore injected through the superolateral portal after sterile prep and 6 mL Durolane.  Postinjection instructions given to the patient.    Assessment:        1. Chronic pain of both knees    2. Primary osteoarthritis of both knees           Plan: Return to see me as needed.  Large Joint  Arthrocentesis  Date/Time: 2/14/2023 11:35 AM  Consent given by: patient  Site marked: site marked  Timeout: Immediately prior to procedure a time out was called to verify the correct patient, procedure, equipment, support staff and site/side marked as required   Supporting Documentation  Indications: pain   Procedure Details  Location: knee - Knee joint: bilateral knee.  Preparation: Patient was prepped and draped in the usual sterile fashion  Needle gauge: 21G.  Approach: superior (LATERAL)  Medications administered: 60 mg Sodium Hyaluronate 60 MG/3ML  Patient tolerance: patient tolerated the procedure well with no immediate complications      BILATERAL DUROLANE PROVIDED VIA [facility].             Work Status:    YANNICK query complete.    Orders:  Orders Placed This Encounter   Procedures   • Large Joint Arthrocentesis       Medications:  No orders of the defined types were placed in this encounter.      Followup:  Return if symptoms worsen or fail to improve.          Dictated utilizing Dragon dictation

## 2023-04-13 ENCOUNTER — TELEPHONE (OUTPATIENT)
Dept: SURGERY | Facility: CLINIC | Age: 68
End: 2023-04-13
Payer: MEDICARE

## 2023-04-13 NOTE — TELEPHONE ENCOUNTER
Hub staff attempted to follow warm transfer process and was unsuccessful     Caller: Diane Gerardo    Relationship to patient: Self    Best call back number: [phone]     Patient is needing: TO ASK QUESTIONS. UNABLE TO TRANSFER

## 2023-04-14 ENCOUNTER — TRANSCRIBE ORDERS (OUTPATIENT)
Dept: ADMINISTRATIVE | Facility: HOSPITAL | Age: 68
End: 2023-04-14
Payer: MEDICARE

## 2023-04-14 DIAGNOSIS — Z87.891 PERSONAL HISTORY OF TOBACCO USE, PRESENTING HAZARDS TO HEALTH: Primary | ICD-10-CM

## 2023-05-16 ENCOUNTER — HOSPITAL ENCOUNTER (OUTPATIENT)
Dept: CT IMAGING | Facility: HOSPITAL | Age: 68
Discharge: HOME | End: 2023-05-16
Admitting: STUDENT IN AN ORGANIZED HEALTH CARE EDUCATION/TRAINING PROGRAM
Payer: MEDICARE

## 2023-05-16 DIAGNOSIS — Z87.891 PERSONAL HISTORY OF TOBACCO USE, PRESENTING HAZARDS TO HEALTH: ICD-10-CM

## 2023-05-16 PROCEDURE — 71271 CT THORAX LUNG CANCER SCR C-: CPT

## 2023-06-19 ENCOUNTER — OFFICE VISIT (OUTPATIENT)
Dept: SURGERY | Facility: CLINIC | Age: 68
End: 2023-06-19
Payer: MEDICARE

## 2023-06-19 VITALS
WEIGHT: 219 LBS | DIASTOLIC BLOOD PRESSURE: 80 MMHG | BODY MASS INDEX: 38.8 KG/M2 | HEART RATE: 72 BPM | HEIGHT: 63 IN | RESPIRATION RATE: 16 BRPM | SYSTOLIC BLOOD PRESSURE: 120 MMHG

## 2023-06-19 DIAGNOSIS — D50.9 IRON DEFICIENCY ANEMIA, UNSPECIFIED IRON DEFICIENCY ANEMIA TYPE: ICD-10-CM

## 2023-06-19 DIAGNOSIS — Z86.010 HISTORY OF COLONIC POLYPS: ICD-10-CM

## 2023-06-19 DIAGNOSIS — Z87.19 HISTORY OF GASTRITIS: Primary | ICD-10-CM

## 2023-06-19 DIAGNOSIS — Z87.19 HISTORY OF GASTRIC POLYP: ICD-10-CM

## 2023-06-19 PROCEDURE — 3074F SYST BP LT 130 MM HG: CPT | Performed by: SURGERY

## 2023-06-19 PROCEDURE — 1160F RVW MEDS BY RX/DR IN RCRD: CPT | Performed by: SURGERY

## 2023-06-19 PROCEDURE — 99214 OFFICE O/P EST MOD 30 MIN: CPT | Performed by: SURGERY

## 2023-06-19 PROCEDURE — 3079F DIAST BP 80-89 MM HG: CPT | Performed by: SURGERY

## 2023-06-19 PROCEDURE — 1159F MED LIST DOCD IN RCRD: CPT | Performed by: SURGERY

## 2023-06-19 RX ORDER — MECLIZINE HYDROCHLORIDE 25 MG/1
25 TABLET ORAL 3 TIMES DAILY PRN
COMMUNITY

## 2023-06-19 NOTE — H&P
Diane Gerardo 67 y.o. female presents @ the req of Dr. Hunter for eval of anemia.  Pt reports + fatigue.    Chief Complaint   Patient presents with    Anemia             HPI   Above noted and agree.  This very pleasant 67-year-old female is well-known to our service.  I repaired a spigelian hernia on her as well as perform endoscopies.  She does have a history of colon polyps.  Her last colonoscopy was greater than a year ago.  She has not had an EGD for several years.  She was diagnosed with anemia and she does have a lot of fatigue.  She has no chest pain or shortness of breath.  She has no fevers or chills.  She does not smoke or use tobacco products.      Review of Systems   All other systems reviewed and are negative.          Current Outpatient Medications:     atorvastatin (LIPITOR) 40 MG tablet, , Disp: , Rfl:     Cyanocobalamin (VITAMIN B 12 PO), Take  by mouth., Disp: , Rfl:     hydrochlorothiazide (HYDRODIURIL) 25 MG tablet, Take 12.5 mg by mouth Every Morning., Disp: , Rfl:     Iron Combinations (IRON COMPLEX PO), Take  by mouth., Disp: , Rfl:     levothyroxine sodium (TIROSINT) 100 MCG capsule, Take 150 mcg by mouth Every Morning., Disp: , Rfl:     lisinopril (PRINIVIL,ZESTRIL) 10 MG tablet, Take 10 mg by mouth Daily., Disp: , Rfl: 3    meclizine (ANTIVERT) 25 MG tablet, Take 1 tablet by mouth 3 (Three) Times a Day As Needed for Dizziness., Disp: , Rfl:     metFORMIN (GLUCOPHAGE) 1000 MG tablet, Take 1 tablet by mouth Every Evening., Disp: , Rfl:     multivitamin with minerals tablet tablet, Take 1 tablet by mouth Daily., Disp: , Rfl:         Allergies   Allergen Reactions    Aspirin Hives    Penicillins Hives and Swelling     Felt like throat was swelling  Hives    Sulfa Antibiotics Hives     Hives, swelling.    Nsaids Hives and Swelling           Past Medical History:   Diagnosis Date    Anemia     history of    Anxiety     CHF (congestive heart failure)     Cholelithiasis     Removed can't remember  date.    Chronic kidney disease     Colon polyp     Coronary artery disease     DDD (degenerative disc disease), lumbosacral     Depression     Diabetes mellitus     BORDERLINE    Disease of thyroid gland     hypothyroid    Diverticulitis of colon 2008    Elevated cholesterol     Fibrocystic breast 1995    Not a problem now, quit drinking caffine.    Hypertension     IBS (irritable bowel syndrome)     Mitral valve prolapse     Rectal bleeding     I have had this with constipation in the past. Not now.    Rheumatoid arthritis     Sleep apnea with use of continuous positive airway pressure (CPAP)     NO LONGER USES CPAP    Thyroid nodule 2008    Continued monitoring,recent biopsy    Ventral hernia     sched repair    Ventral hernia     SCHEDULED FOR REPAIR           Past Surgical History:   Procedure Laterality Date    ANTERIOR CERVICAL FUSION  2011    C4C5    CARDIAC CATHETERIZATION N/A 10/12/2022    Procedure: Left Heart Cath;  Surgeon: Madan Hu MD;  Location:  [facility];  Service: Cardiology;  Laterality: N/A;    CARDIAC CATHETERIZATION N/A 10/12/2022    Procedure: Coronary angiography;  Surgeon: Madan Hu MD;  Location:  [facility];  Service: Cardiology;  Laterality: N/A;    CHOLECYSTECTOMY      LAP    COLONOSCOPY N/A 02/08/2017    Procedure: COLONOSCOPY;  Surgeon: Domi Juarez MD;  Location: [facility] OR;  Service:     COLONOSCOPY W/ POLYPECTOMY N/A 04/26/2022    Procedure: COLONOSCOPY with polypectomy;  Surgeon: Erin Fried DO;  Location: [facility] OR;  Service: Gastroenterology;  Laterality: N/A;  right colon polyp  rectal polyps x3    ENDOSCOPY N/A 02/08/2017    Procedure: ESOPHAGOGASTRODUODENOSCOPY;  Surgeon: Domi Juarez MD;  Location: [facility] OR;  Service:     ENDOSCOPY N/A 07/21/2020    Procedure: ESOPHAGOGASTRODUODENOSCOPY with nirmal test;  Surgeon: Erin Fried DO;  Location: [facility] OR;  Service: Gastroenterology;  Laterality:  "N/A;  normal exam    HYSTERECTOMY      partial 1984, 2002 complete    LAPAROSCOPIC LYSIS OF ADHESIONS  2002    and cyst removal    OVARIAN CYST REMOVAL      laparoscopic x2 last 2006    VENTRAL/INCISIONAL HERNIA REPAIR N/A 08/02/2018    Procedure: VENTRAL AND SPIGELIAN HERNIA REPAIR LAPAROSCOPIC;  Surgeon: Erin Fried DO;  Location: [facility] OR;  Service: General    VENTRAL/INCISIONAL HERNIA REPAIR N/A 06/06/2019    Procedure: VENTRAL/INCISIONAL HERNIA REPAIR;  Surgeon: Erin Fried DO;  Location: [facility] OR;  Service: General           Social History     Tobacco Use    Smoking status: Former     Packs/day: 1.00     Years: 10.00     Pack years: 10.00     Types: Cigarettes     Quit date: 1/1/2008     Years since quitting: 15.4    Smokeless tobacco: Never   Vaping Use    Vaping Use: Never used   Substance Use Topics    Alcohol use: Not Currently     Comment: Just on special occasions.    Drug use: No             There is no immunization history on file for this patient.        Physical Exam  Vitals and nursing note reviewed.   Constitutional:       Appearance: Normal appearance.   HENT:      Head: Normocephalic and atraumatic.   Cardiovascular:      Rate and Rhythm: Normal rate and regular rhythm.   Pulmonary:      Effort: Pulmonary effort is normal.      Breath sounds: Normal breath sounds.   Abdominal:      General: Bowel sounds are normal.      Palpations: Abdomen is soft.   Musculoskeletal:         General: No swelling or tenderness.   Skin:     General: Skin is warm and dry.   Neurological:      General: No focal deficit present.      Mental Status: She is alert and oriented to person, place, and time.   Psychiatric:         Mood and Affect: Mood normal.         Behavior: Behavior normal.       Debilities/Disabilities Identified: None    Emotional Behavior: Appropriate      /80   Pulse 72   Resp 16   Ht 160 cm (63\")   Wt 99.3 kg (219 lb)   BMI 38.79 kg/m²         Diagnoses and all orders " for this visit:    1. History of gastritis (Primary)    2. History of gastric polyp    3. History of colonic polyps    4. Iron deficiency anemia, unspecified iron deficiency anemia type    We will get Diane scheduled for an EGD and colonoscopy.  I discussed with the patient the benefits and risks of performing endoscopy.  Benefits and risks were not limited to but including bleeding, infection, perforation, complications of anesthesia, aspiration.  The patient appeared to understand and is willing to proceed.    Thank you for allowing me to participate in the care of this interesting patient.

## 2023-06-19 NOTE — LETTER
June 19, 2023       No Recipients    Patient: Diane Gerardo   YOB: 1955   Date of Visit: 6/19/2023       Dear Dr. Martinez Recipients:    Thank you for referring Diane Gerardo to me for evaluation. Below are the relevant portions of my assessment and plan of care.    If you have questions, please do not hesitate to call me. I look forward to following Diane along with you.         Sincerely,        Erin Fried DO        CC:   No Recipients    Erin Fried DO  06/19/23 1010  Sign when Signing Visit  Diane Gerardo 67 y.o. female presents @ the req of Dr. Hunter for eval of anemia.  Pt reports + fatigue.    Chief Complaint   Patient presents with   • Anemia             HPI   Above noted and agree.  This very pleasant 67-year-old female is well-known to our service.  I repaired a spigelian hernia on her as well as perform endoscopies.  She does have a history of colon polyps.  Her last colonoscopy was greater than a year ago.  She has not had an EGD for several years.  She was diagnosed with anemia and she does have a lot of fatigue.  She has no chest pain or shortness of breath.  She has no fevers or chills.  She does not smoke or use tobacco products.      Review of Systems   All other systems reviewed and are negative.          Current Outpatient Medications:   •  atorvastatin (LIPITOR) 40 MG tablet, , Disp: , Rfl:   •  Cyanocobalamin (VITAMIN B 12 PO), Take  by mouth., Disp: , Rfl:   •  hydrochlorothiazide (HYDRODIURIL) 25 MG tablet, Take 12.5 mg by mouth Every Morning., Disp: , Rfl:   •  Iron Combinations (IRON COMPLEX PO), Take  by mouth., Disp: , Rfl:   •  levothyroxine sodium (TIROSINT) 100 MCG capsule, Take 150 mcg by mouth Every Morning., Disp: , Rfl:   •  lisinopril (PRINIVIL,ZESTRIL) 10 MG tablet, Take 10 mg by mouth Daily., Disp: , Rfl: 3  •  meclizine (ANTIVERT) 25 MG tablet, Take 1 tablet by mouth 3 (Three) Times a Day As Needed for Dizziness., Disp: , Rfl:   •  metFORMIN  (GLUCOPHAGE) 1000 MG tablet, Take 1 tablet by mouth Every Evening., Disp: , Rfl:   •  multivitamin with minerals tablet tablet, Take 1 tablet by mouth Daily., Disp: , Rfl:         Allergies   Allergen Reactions   • Aspirin Hives   • Penicillins Hives and Swelling     Felt like throat was swelling  Hives   • Sulfa Antibiotics Hives     Hives, swelling.   • Nsaids Hives and Swelling           Past Medical History:   Diagnosis Date   • Anemia     history of   • Anxiety    • CHF (congestive heart failure)    • Cholelithiasis     Removed can't remember date.   • Chronic kidney disease    • Colon polyp    • Coronary artery disease    • DDD (degenerative disc disease), lumbosacral    • Depression    • Diabetes mellitus     BORDERLINE   • Disease of thyroid gland     hypothyroid   • Diverticulitis of colon 2008   • Elevated cholesterol    • Fibrocystic breast 1995    Not a problem now, quit drinking caffine.   • Hypertension    • IBS (irritable bowel syndrome)    • Mitral valve prolapse    • Rectal bleeding     I have had this with constipation in the past. Not now.   • Rheumatoid arthritis    • Sleep apnea with use of continuous positive airway pressure (CPAP)     NO LONGER USES CPAP   • Thyroid nodule 2008    Continued monitoring,recent biopsy   • Ventral hernia     sched repair   • Ventral hernia     SCHEDULED FOR REPAIR           Past Surgical History:   Procedure Laterality Date   • ANTERIOR CERVICAL FUSION  2011    C4C5   • CARDIAC CATHETERIZATION N/A 10/12/2022    Procedure: Left Heart Cath;  Surgeon: Madan Hu MD;  Location:  [facility];  Service: Cardiology;  Laterality: N/A;   • CARDIAC CATHETERIZATION N/A 10/12/2022    Procedure: Coronary angiography;  Surgeon: Madan Hu MD;  Location:  [facility];  Service: Cardiology;  Laterality: N/A;   • CHOLECYSTECTOMY      LAP   • COLONOSCOPY N/A 02/08/2017    Procedure: COLONOSCOPY;  Surgeon: Domi Juarez MD;   Location: [facility] OR;  Service:    • COLONOSCOPY W/ POLYPECTOMY N/A 04/26/2022    Procedure: COLONOSCOPY with polypectomy;  Surgeon: Erin Fried DO;  Location: [facility] OR;  Service: Gastroenterology;  Laterality: N/A;  right colon polyp  rectal polyps x3   • ENDOSCOPY N/A 02/08/2017    Procedure: ESOPHAGOGASTRODUODENOSCOPY;  Surgeon: Domi Juarez MD;  Location: [facility] OR;  Service:    • ENDOSCOPY N/A 07/21/2020    Procedure: ESOPHAGOGASTRODUODENOSCOPY with nirmal test;  Surgeon: Erin Fried DO;  Location: [facility] OR;  Service: Gastroenterology;  Laterality: N/A;  normal exam   • HYSTERECTOMY      partial 1984, 2002 complete   • LAPAROSCOPIC LYSIS OF ADHESIONS  2002    and cyst removal   • OVARIAN CYST REMOVAL      laparoscopic x2 last 2006   • VENTRAL/INCISIONAL HERNIA REPAIR N/A 08/02/2018    Procedure: VENTRAL AND SPIGELIAN HERNIA REPAIR LAPAROSCOPIC;  Surgeon: Erin Fried DO;  Location: [facility] OR;  Service: General   • VENTRAL/INCISIONAL HERNIA REPAIR N/A 06/06/2019    Procedure: VENTRAL/INCISIONAL HERNIA REPAIR;  Surgeon: Erin Fried DO;  Location: [facility] OR;  Service: General           Social History     Tobacco Use   • Smoking status: Former     Packs/day: 1.00     Years: 10.00     Pack years: 10.00     Types: Cigarettes     Quit date: 1/1/2008     Years since quitting: 15.4   • Smokeless tobacco: Never   Vaping Use   • Vaping Use: Never used   Substance Use Topics   • Alcohol use: Not Currently     Comment: Just on special occasions.   • Drug use: No             There is no immunization history on file for this patient.        Physical Exam  Vitals and nursing note reviewed.   Constitutional:       Appearance: Normal appearance.   HENT:      Head: Normocephalic and atraumatic.   Cardiovascular:      Rate and Rhythm: Normal rate and regular rhythm.   Pulmonary:      Effort: Pulmonary effort is normal.      Breath sounds: Normal breath sounds.   Abdominal:      General: Bowel  "sounds are normal.      Palpations: Abdomen is soft.   Musculoskeletal:         General: No swelling or tenderness.   Skin:     General: Skin is warm and dry.   Neurological:      General: No focal deficit present.      Mental Status: She is alert and oriented to person, place, and time.   Psychiatric:         Mood and Affect: Mood normal.         Behavior: Behavior normal.       Debilities/Disabilities Identified: None    Emotional Behavior: Appropriate      /80   Pulse 72   Resp 16   Ht 160 cm (63\")   Wt 99.3 kg (219 lb)   BMI 38.79 kg/m²         Diagnoses and all orders for this visit:    1. History of gastritis (Primary)    2. History of gastric polyp    3. History of colonic polyps    4. Iron deficiency anemia, unspecified iron deficiency anemia type    We will get Diane scheduled for an EGD and colonoscopy.  I discussed with the patient the benefits and risks of performing endoscopy.  Benefits and risks were not limited to but including bleeding, infection, perforation, complications of anesthesia, aspiration.  The patient appeared to understand and is willing to proceed.    Thank you for allowing me to participate in the care of this interesting patient.             "

## 2023-06-19 NOTE — PROGRESS NOTES
Diane Gerardo 67 y.o. female presents @ the req of Dr. Hunter for eval of anemia.  Pt reports + fatigue.    Chief Complaint   Patient presents with    Anemia             HPI   Above noted and agree.  This very pleasant 67-year-old female is well-known to our service.  I repaired a spigelian hernia on her as well as perform endoscopies.  She does have a history of colon polyps.  Her last colonoscopy was greater than a year ago.  She has not had an EGD for several years.  She was diagnosed with anemia and she does have a lot of fatigue.  She has no chest pain or shortness of breath.  She has no fevers or chills.  She does not smoke or use tobacco products.      Review of Systems   All other systems reviewed and are negative.          Current Outpatient Medications:     atorvastatin (LIPITOR) 40 MG tablet, , Disp: , Rfl:     Cyanocobalamin (VITAMIN B 12 PO), Take  by mouth., Disp: , Rfl:     hydrochlorothiazide (HYDRODIURIL) 25 MG tablet, Take 12.5 mg by mouth Every Morning., Disp: , Rfl:     Iron Combinations (IRON COMPLEX PO), Take  by mouth., Disp: , Rfl:     levothyroxine sodium (TIROSINT) 100 MCG capsule, Take 150 mcg by mouth Every Morning., Disp: , Rfl:     lisinopril (PRINIVIL,ZESTRIL) 10 MG tablet, Take 10 mg by mouth Daily., Disp: , Rfl: 3    meclizine (ANTIVERT) 25 MG tablet, Take 1 tablet by mouth 3 (Three) Times a Day As Needed for Dizziness., Disp: , Rfl:     metFORMIN (GLUCOPHAGE) 1000 MG tablet, Take 1 tablet by mouth Every Evening., Disp: , Rfl:     multivitamin with minerals tablet tablet, Take 1 tablet by mouth Daily., Disp: , Rfl:         Allergies   Allergen Reactions    Aspirin Hives    Penicillins Hives and Swelling     Felt like throat was swelling  Hives    Sulfa Antibiotics Hives     Hives, swelling.    Nsaids Hives and Swelling           Past Medical History:   Diagnosis Date    Anemia     history of    Anxiety     CHF (congestive heart failure)     Cholelithiasis     Removed can't remember  date.    Chronic kidney disease     Colon polyp     Coronary artery disease     DDD (degenerative disc disease), lumbosacral     Depression     Diabetes mellitus     BORDERLINE    Disease of thyroid gland     hypothyroid    Diverticulitis of colon 2008    Elevated cholesterol     Fibrocystic breast 1995    Not a problem now, quit drinking caffine.    Hypertension     IBS (irritable bowel syndrome)     Mitral valve prolapse     Rectal bleeding     I have had this with constipation in the past. Not now.    Rheumatoid arthritis     Sleep apnea with use of continuous positive airway pressure (CPAP)     NO LONGER USES CPAP    Thyroid nodule 2008    Continued monitoring,recent biopsy    Ventral hernia     sched repair    Ventral hernia     SCHEDULED FOR REPAIR           Past Surgical History:   Procedure Laterality Date    ANTERIOR CERVICAL FUSION  2011    C4C5    CARDIAC CATHETERIZATION N/A 10/12/2022    Procedure: Left Heart Cath;  Surgeon: Madan Hu MD;  Location:  [facility];  Service: Cardiology;  Laterality: N/A;    CARDIAC CATHETERIZATION N/A 10/12/2022    Procedure: Coronary angiography;  Surgeon: Madan Hu MD;  Location:  [facility];  Service: Cardiology;  Laterality: N/A;    CHOLECYSTECTOMY      LAP    COLONOSCOPY N/A 02/08/2017    Procedure: COLONOSCOPY;  Surgeon: Domi Juarez MD;  Location: [facility] OR;  Service:     COLONOSCOPY W/ POLYPECTOMY N/A 04/26/2022    Procedure: COLONOSCOPY with polypectomy;  Surgeon: Erin Fried DO;  Location: [facility] OR;  Service: Gastroenterology;  Laterality: N/A;  right colon polyp  rectal polyps x3    ENDOSCOPY N/A 02/08/2017    Procedure: ESOPHAGOGASTRODUODENOSCOPY;  Surgeon: Domi Juarez MD;  Location: [facility] OR;  Service:     ENDOSCOPY N/A 07/21/2020    Procedure: ESOPHAGOGASTRODUODENOSCOPY with nirmal test;  Surgeon: Erin Fried DO;  Location: [facility] OR;  Service: Gastroenterology;  Laterality:  "N/A;  normal exam    HYSTERECTOMY      partial 1984, 2002 complete    LAPAROSCOPIC LYSIS OF ADHESIONS  2002    and cyst removal    OVARIAN CYST REMOVAL      laparoscopic x2 last 2006    VENTRAL/INCISIONAL HERNIA REPAIR N/A 08/02/2018    Procedure: VENTRAL AND SPIGELIAN HERNIA REPAIR LAPAROSCOPIC;  Surgeon: Erin Fried DO;  Location: [facility] OR;  Service: General    VENTRAL/INCISIONAL HERNIA REPAIR N/A 06/06/2019    Procedure: VENTRAL/INCISIONAL HERNIA REPAIR;  Surgeon: Erin Fried DO;  Location: [facility] OR;  Service: General           Social History     Tobacco Use    Smoking status: Former     Packs/day: 1.00     Years: 10.00     Pack years: 10.00     Types: Cigarettes     Quit date: 1/1/2008     Years since quitting: 15.4    Smokeless tobacco: Never   Vaping Use    Vaping Use: Never used   Substance Use Topics    Alcohol use: Not Currently     Comment: Just on special occasions.    Drug use: No             There is no immunization history on file for this patient.        Physical Exam  Vitals and nursing note reviewed.   Constitutional:       Appearance: Normal appearance.   HENT:      Head: Normocephalic and atraumatic.   Cardiovascular:      Rate and Rhythm: Normal rate and regular rhythm.   Pulmonary:      Effort: Pulmonary effort is normal.      Breath sounds: Normal breath sounds.   Abdominal:      General: Bowel sounds are normal.      Palpations: Abdomen is soft.   Musculoskeletal:         General: No swelling or tenderness.   Skin:     General: Skin is warm and dry.   Neurological:      General: No focal deficit present.      Mental Status: She is alert and oriented to person, place, and time.   Psychiatric:         Mood and Affect: Mood normal.         Behavior: Behavior normal.       Debilities/Disabilities Identified: None    Emotional Behavior: Appropriate      /80   Pulse 72   Resp 16   Ht 160 cm (63\")   Wt 99.3 kg (219 lb)   BMI 38.79 kg/m²         Diagnoses and all orders " for this visit:    1. History of gastritis (Primary)    2. History of gastric polyp    3. History of colonic polyps    4. Iron deficiency anemia, unspecified iron deficiency anemia type    We will get Diane scheduled for an EGD and colonoscopy.  I discussed with the patient the benefits and risks of performing endoscopy.  Benefits and risks were not limited to but including bleeding, infection, perforation, complications of anesthesia, aspiration.  The patient appeared to understand and is willing to proceed.    Thank you for allowing me to participate in the care of this interesting patient.

## 2023-06-20 PROBLEM — D50.9 IRON DEFICIENCY ANEMIA: Status: ACTIVE | Noted: 2023-06-20

## 2023-07-31 ENCOUNTER — ANESTHESIA EVENT (OUTPATIENT)
Dept: PERIOP | Facility: HOSPITAL | Age: 68
End: 2023-07-31
Payer: MEDICARE

## 2023-07-31 RX ORDER — ESCITALOPRAM OXALATE 10 MG/1
10 TABLET ORAL DAILY
COMMUNITY

## 2023-08-01 ENCOUNTER — ANESTHESIA (OUTPATIENT)
Dept: PERIOP | Facility: HOSPITAL | Age: 68
End: 2023-08-01
Payer: MEDICARE

## 2023-08-01 ENCOUNTER — HOSPITAL ENCOUNTER (OUTPATIENT)
Facility: HOSPITAL | Age: 68
Setting detail: HOSPITAL OUTPATIENT SURGERY
Discharge: HOME | End: 2023-08-01
Attending: SURGERY | Admitting: SURGERY
Payer: MEDICARE

## 2023-08-01 VITALS
WEIGHT: 210.6 LBS | HEART RATE: 57 BPM | SYSTOLIC BLOOD PRESSURE: 128 MMHG | BODY MASS INDEX: 37.31 KG/M2 | OXYGEN SATURATION: 97 % | DIASTOLIC BLOOD PRESSURE: 61 MMHG | RESPIRATION RATE: 14 BRPM | TEMPERATURE: 97.6 F

## 2023-08-01 DIAGNOSIS — Z87.19 HISTORY OF GASTRITIS: ICD-10-CM

## 2023-08-01 DIAGNOSIS — Z86.010 HISTORY OF COLONIC POLYPS: ICD-10-CM

## 2023-08-01 DIAGNOSIS — Z87.19 HISTORY OF GASTRIC POLYP: ICD-10-CM

## 2023-08-01 DIAGNOSIS — D50.9 IRON DEFICIENCY ANEMIA, UNSPECIFIED IRON DEFICIENCY ANEMIA TYPE: ICD-10-CM

## 2023-08-01 LAB
GLUCOSE BLDC GLUCOMTR-MCNC: 123 MG/DL (ref 70–130)
POTASSIUM SERPL-SCNC: 3.6 MMOL/L (ref 3.5–5.2)
UREASE TISS QL: POSITIVE

## 2023-08-01 PROCEDURE — 45380 COLONOSCOPY AND BIOPSY: CPT | Performed by: SURGERY

## 2023-08-01 PROCEDURE — 25010000002 PROPOFOL 200 MG/20ML EMULSION: Performed by: NURSE ANESTHETIST, CERTIFIED REGISTERED

## 2023-08-01 PROCEDURE — 87081 CULTURE SCREEN ONLY: CPT | Performed by: SURGERY

## 2023-08-01 PROCEDURE — 84132 ASSAY OF SERUM POTASSIUM: CPT | Performed by: NURSE ANESTHETIST, CERTIFIED REGISTERED

## 2023-08-01 PROCEDURE — 88305 TISSUE EXAM BY PATHOLOGIST: CPT | Performed by: SURGERY

## 2023-08-01 PROCEDURE — 82948 REAGENT STRIP/BLOOD GLUCOSE: CPT

## 2023-08-01 PROCEDURE — 43239 EGD BIOPSY SINGLE/MULTIPLE: CPT | Performed by: SURGERY

## 2023-08-01 RX ORDER — PROPOFOL 10 MG/ML
INJECTION, EMULSION INTRAVENOUS AS NEEDED
Status: DISCONTINUED | OUTPATIENT
Start: 2023-08-01 | End: 2023-08-01 | Stop reason: SURG

## 2023-08-01 RX ORDER — SODIUM CHLORIDE 0.9 % (FLUSH) 0.9 %
10 SYRINGE (ML) INJECTION AS NEEDED
Status: DISCONTINUED | OUTPATIENT
Start: 2023-08-01 | End: 2023-08-01 | Stop reason: HOSPADM

## 2023-08-01 RX ORDER — ONDANSETRON 2 MG/ML
4 INJECTION INTRAMUSCULAR; INTRAVENOUS ONCE AS NEEDED
Status: DISCONTINUED | OUTPATIENT
Start: 2023-08-01 | End: 2023-08-01 | Stop reason: HOSPADM

## 2023-08-01 RX ORDER — SODIUM CHLORIDE, SODIUM LACTATE, POTASSIUM CHLORIDE, CALCIUM CHLORIDE 600; 310; 30; 20 MG/100ML; MG/100ML; MG/100ML; MG/100ML
9 INJECTION, SOLUTION INTRAVENOUS CONTINUOUS
Status: DISCONTINUED | OUTPATIENT
Start: 2023-08-01 | End: 2023-08-01 | Stop reason: HOSPADM

## 2023-08-01 RX ORDER — MAGNESIUM HYDROXIDE 1200 MG/15ML
LIQUID ORAL AS NEEDED
Status: DISCONTINUED | OUTPATIENT
Start: 2023-08-01 | End: 2023-08-01 | Stop reason: HOSPADM

## 2023-08-01 RX ORDER — SODIUM CHLORIDE 0.9 % (FLUSH) 0.9 %
10 SYRINGE (ML) INJECTION EVERY 12 HOURS SCHEDULED
Status: DISCONTINUED | OUTPATIENT
Start: 2023-08-01 | End: 2023-08-01 | Stop reason: HOSPADM

## 2023-08-01 RX ORDER — LIDOCAINE HYDROCHLORIDE 20 MG/ML
INJECTION, SOLUTION INFILTRATION; PERINEURAL AS NEEDED
Status: DISCONTINUED | OUTPATIENT
Start: 2023-08-01 | End: 2023-08-01 | Stop reason: SURG

## 2023-08-01 RX ORDER — SODIUM CHLORIDE 9 MG/ML
40 INJECTION, SOLUTION INTRAVENOUS AS NEEDED
Status: DISCONTINUED | OUTPATIENT
Start: 2023-08-01 | End: 2023-08-01 | Stop reason: HOSPADM

## 2023-08-01 RX ORDER — SODIUM CHLORIDE, SODIUM LACTATE, POTASSIUM CHLORIDE, CALCIUM CHLORIDE 600; 310; 30; 20 MG/100ML; MG/100ML; MG/100ML; MG/100ML
100 INJECTION, SOLUTION INTRAVENOUS CONTINUOUS
Status: DISCONTINUED | OUTPATIENT
Start: 2023-08-01 | End: 2023-08-01 | Stop reason: HOSPADM

## 2023-08-01 RX ORDER — OMEPRAZOLE 40 MG/1
40 CAPSULE, DELAYED RELEASE ORAL DAILY
Qty: 30 CAPSULE | Refills: 6 | Status: SHIPPED | OUTPATIENT
Start: 2023-08-01

## 2023-08-01 RX ADMIN — PROPOFOL 100 MG: 10 INJECTION, EMULSION INTRAVENOUS at 08:15

## 2023-08-01 RX ADMIN — PROPOFOL 50 MG: 10 INJECTION, EMULSION INTRAVENOUS at 08:19

## 2023-08-01 RX ADMIN — PROPOFOL 100 MG: 10 INJECTION, EMULSION INTRAVENOUS at 08:05

## 2023-08-01 RX ADMIN — PROPOFOL 50 MG: 10 INJECTION, EMULSION INTRAVENOUS at 08:24

## 2023-08-01 RX ADMIN — PROPOFOL 100 MG: 10 INJECTION, EMULSION INTRAVENOUS at 08:09

## 2023-08-01 RX ADMIN — SODIUM CHLORIDE, POTASSIUM CHLORIDE, SODIUM LACTATE AND CALCIUM CHLORIDE 9 ML/HR: 600; 310; 30; 20 INJECTION, SOLUTION INTRAVENOUS at 07:39

## 2023-08-01 RX ADMIN — LIDOCAINE HYDROCHLORIDE 100 MG: 20 INJECTION, SOLUTION INFILTRATION; PERINEURAL at 08:05

## 2023-08-01 NOTE — OP NOTE
EGD and Colonoscopy Procedure Note      Pre-operative Diagnosis: History of gastritis, history of gastric polyps, history of colon polyps, iron deficiency anemia    Post-operative Diagnosis: Mild gastritis, gastric polyps, hepatic flexure polyp, and rectal polyp    Procedure:  EGD with biopsies using cold forceps and polypectomy using cold forceps and  Colonoscopy with polypectomy using cold forceps    Surgeon: Fariha    Anesthetic: MAC    Estimated Blood Loss: Minimal    Complications: None    Indications: See preoperative diagnosis    Findings/Treatments:   Gastritis-biopsy for H. pylori with cold forceps  Gastric polyps-biopsied and removed using cold forceps  Hepatic flexure polyp-removed cold forceps  Rectal polyp-removed cold forceps       Scope Withdrawal Time:  6 minutes      Recommendations: Await pathology      Procedure Details     After discussing the benefits and risks of an EGD and Colonoscopy, benefits and risks not limited to but including:  Bleeding, infection, perforation, aspiration; informed consent was signed.  The patient was taken into the endoscopy suite at [facility] and placed in the left lateral decubitus position.  MAC anesthesia was induced under appropriate monitoring.  Bite block was placed and the gastroscope was inserted thru such and advanced under direct vision to second portion of the duodenum.  A careful inspection was made as the gastroscope was withdrawn, including a retroflexed view of the proximal stomach; findings and interventions are described below.  If biopsies were taken, this was done with the cold biopsy forceps.    The bed was then rotated 180 degrees.  A rectal exam was performed.  Sphincter tone was normal.  The colonoscope was then inserted and carefully advanced to the cecum while visualizing the mucosa.  The cecum was identified by the ileocecal valve and the orifice of the appendix.  Once in the cecum the scope was slowly withdrawn  while carefully evaluating the mucosa and deflating air.  There was a hepatic flexure polyp removed cold forceps and a rectal polyp removed cold forceps.  The scope was retroflexed and straightened and removed.  Diane was taken to the recovery area in stable postoperative condition having tolerated her procedure well.    Erin Fried DO

## 2023-08-01 NOTE — H&P
Diane Gerardo 67 y.o. female presents @ the req of Dr. Hunter for eval of anemia.  Pt reports + fatigue.    Chief Complaint   Patient presents with    Anemia             HPI   Above noted and agree.  This very pleasant 67-year-old female is well-known to our service.  I repaired a spigelian hernia on her as well as perform endoscopies.  She does have a history of colon polyps.  Her last colonoscopy was greater than a year ago.  She has not had an EGD for several years.  She was diagnosed with anemia and she does have a lot of fatigue.  She has no chest pain or shortness of breath.  She has no fevers or chills.  She does not smoke or use tobacco products.      Review of Systems   All other systems reviewed and are negative.          Current Outpatient Medications:     atorvastatin (LIPITOR) 40 MG tablet, , Disp: , Rfl:     Cyanocobalamin (VITAMIN B 12 PO), Take  by mouth., Disp: , Rfl:     hydrochlorothiazide (HYDRODIURIL) 25 MG tablet, Take 12.5 mg by mouth Every Morning., Disp: , Rfl:     Iron Combinations (IRON COMPLEX PO), Take  by mouth., Disp: , Rfl:     levothyroxine sodium (TIROSINT) 100 MCG capsule, Take 150 mcg by mouth Every Morning., Disp: , Rfl:     lisinopril (PRINIVIL,ZESTRIL) 10 MG tablet, Take 10 mg by mouth Daily., Disp: , Rfl: 3    meclizine (ANTIVERT) 25 MG tablet, Take 1 tablet by mouth 3 (Three) Times a Day As Needed for Dizziness., Disp: , Rfl:     metFORMIN (GLUCOPHAGE) 1000 MG tablet, Take 1 tablet by mouth Every Evening., Disp: , Rfl:     multivitamin with minerals tablet tablet, Take 1 tablet by mouth Daily., Disp: , Rfl:         Allergies   Allergen Reactions    Aspirin Hives    Penicillins Hives and Swelling     Felt like throat was swelling  Hives    Sulfa Antibiotics Hives     Hives, swelling.    Nsaids Hives and Swelling           Past Medical History:   Diagnosis Date    Anemia     history of    Anxiety     CHF (congestive heart failure)     Cholelithiasis     Removed can't remember  date.    Chronic kidney disease     Colon polyp     Coronary artery disease     DDD (degenerative disc disease), lumbosacral     Depression     Diabetes mellitus     BORDERLINE    Disease of thyroid gland     hypothyroid    Diverticulitis of colon 2008    Elevated cholesterol     Fibrocystic breast 1995    Not a problem now, quit drinking caffine.    Hypertension     IBS (irritable bowel syndrome)     Mitral valve prolapse     Rectal bleeding     I have had this with constipation in the past. Not now.    Rheumatoid arthritis     Sleep apnea with use of continuous positive airway pressure (CPAP)     NO LONGER USES CPAP    Thyroid nodule 2008    Continued monitoring,recent biopsy    Ventral hernia     sched repair    Ventral hernia     SCHEDULED FOR REPAIR           Past Surgical History:   Procedure Laterality Date    ANTERIOR CERVICAL FUSION  2011    C4C5    CARDIAC CATHETERIZATION N/A 10/12/2022    Procedure: Left Heart Cath;  Surgeon: Madan Hu MD;  Location:  [facility];  Service: Cardiology;  Laterality: N/A;    CARDIAC CATHETERIZATION N/A 10/12/2022    Procedure: Coronary angiography;  Surgeon: Madan Hu MD;  Location:  [facility];  Service: Cardiology;  Laterality: N/A;    CHOLECYSTECTOMY      LAP    COLONOSCOPY N/A 02/08/2017    Procedure: COLONOSCOPY;  Surgeon: Domi Juarez MD;  Location: [facility] OR;  Service:     COLONOSCOPY W/ POLYPECTOMY N/A 04/26/2022    Procedure: COLONOSCOPY with polypectomy;  Surgeon: Erin Fried DO;  Location: [facility] OR;  Service: Gastroenterology;  Laterality: N/A;  right colon polyp  rectal polyps x3    ENDOSCOPY N/A 02/08/2017    Procedure: ESOPHAGOGASTRODUODENOSCOPY;  Surgeon: Domi Juarez MD;  Location: [facility] OR;  Service:     ENDOSCOPY N/A 07/21/2020    Procedure: ESOPHAGOGASTRODUODENOSCOPY with nirmal test;  Surgeon: Erin Fried DO;  Location: [facility] OR;  Service: Gastroenterology;  Laterality:  "N/A;  normal exam    HYSTERECTOMY      partial 1984, 2002 complete    LAPAROSCOPIC LYSIS OF ADHESIONS  2002    and cyst removal    OVARIAN CYST REMOVAL      laparoscopic x2 last 2006    VENTRAL/INCISIONAL HERNIA REPAIR N/A 08/02/2018    Procedure: VENTRAL AND SPIGELIAN HERNIA REPAIR LAPAROSCOPIC;  Surgeon: Erin Fried DO;  Location: [facility] OR;  Service: General    VENTRAL/INCISIONAL HERNIA REPAIR N/A 06/06/2019    Procedure: VENTRAL/INCISIONAL HERNIA REPAIR;  Surgeon: Erin Fried DO;  Location: [facility] OR;  Service: General           Social History     Tobacco Use    Smoking status: Former     Packs/day: 1.00     Years: 10.00     Pack years: 10.00     Types: Cigarettes     Quit date: 1/1/2008     Years since quitting: 15.4    Smokeless tobacco: Never   Vaping Use    Vaping Use: Never used   Substance Use Topics    Alcohol use: Not Currently     Comment: Just on special occasions.    Drug use: No             There is no immunization history on file for this patient.        Physical Exam  Vitals and nursing note reviewed.   Constitutional:       Appearance: Normal appearance.   HENT:      Head: Normocephalic and atraumatic.   Cardiovascular:      Rate and Rhythm: Normal rate and regular rhythm.   Pulmonary:      Effort: Pulmonary effort is normal.      Breath sounds: Normal breath sounds.   Abdominal:      General: Bowel sounds are normal.      Palpations: Abdomen is soft.   Musculoskeletal:         General: No swelling or tenderness.   Skin:     General: Skin is warm and dry.   Neurological:      General: No focal deficit present.      Mental Status: She is alert and oriented to person, place, and time.   Psychiatric:         Mood and Affect: Mood normal.         Behavior: Behavior normal.       Debilities/Disabilities Identified: None    Emotional Behavior: Appropriate      /80   Pulse 72   Resp 16   Ht 160 cm (63\")   Wt 99.3 kg (219 lb)   BMI 38.79 kg/mý         Diagnoses and all orders " for this visit:    1. History of gastritis (Primary)    2. History of gastric polyp    3. History of colonic polyps    4. Iron deficiency anemia, unspecified iron deficiency anemia type    We will get Diane scheduled for an EGD and colonoscopy.  I discussed with the patient the benefits and risks of performing endoscopy.  Benefits and risks were not limited to but including bleeding, infection, perforation, complications of anesthesia, aspiration.  The patient appeared to understand and is willing to proceed.    Thank you for allowing me to participate in the care of this interesting patient.

## 2023-08-02 LAB
LAB AP CASE REPORT: NORMAL
PATH REPORT.FINAL DX SPEC: NORMAL
PATH REPORT.GROSS SPEC: NORMAL

## 2023-08-15 ENCOUNTER — TRANSCRIBE ORDERS (OUTPATIENT)
Dept: ADMINISTRATIVE | Facility: HOSPITAL | Age: 68
End: 2023-08-15
Payer: MEDICARE

## 2023-08-15 DIAGNOSIS — H93.A3 PULSATILE TINNITUS, BILATERAL: Primary | ICD-10-CM

## 2023-08-24 ENCOUNTER — TELEPHONE (OUTPATIENT)
Dept: NEUROLOGY | Facility: CLINIC | Age: 68
End: 2023-08-24
Payer: MEDICARE

## 2023-08-24 ENCOUNTER — OFFICE VISIT (OUTPATIENT)
Dept: NEUROLOGY | Facility: CLINIC | Age: 68
End: 2023-08-24
Payer: MEDICARE

## 2023-08-24 VITALS
HEART RATE: 84 BPM | WEIGHT: 213.4 LBS | BODY MASS INDEX: 37.81 KG/M2 | SYSTOLIC BLOOD PRESSURE: 110 MMHG | DIASTOLIC BLOOD PRESSURE: 54 MMHG | HEIGHT: 63 IN | OXYGEN SATURATION: 98 %

## 2023-08-24 DIAGNOSIS — R55 SYNCOPE AND COLLAPSE: ICD-10-CM

## 2023-08-24 DIAGNOSIS — F07.81 POST CONCUSSION SYNDROME: Primary | ICD-10-CM

## 2023-08-24 NOTE — PROGRESS NOTES
Subjective:     Patient ID: Diane Gerardo is a 67 y.o. female.    History of Present Illness  {Common [facility] SmartLinks:13877}    Review of Systems     Objective:    Neurologic Exam    Physical Exam    Assessment/Plan:     {Assess/PlanSmartLinks:28685}
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
## 2023-08-29 ENCOUNTER — PATIENT ROUNDING (BHMG ONLY) (OUTPATIENT)
Dept: NEUROLOGY | Facility: CLINIC | Age: 68
End: 2023-08-29
Payer: MEDICARE

## 2023-09-06 ENCOUNTER — APPOINTMENT (OUTPATIENT)
Dept: OTHER | Facility: HOSPITAL | Age: 68
End: 2023-09-06
Payer: MEDICARE

## 2023-09-06 ENCOUNTER — HOSPITAL ENCOUNTER (OUTPATIENT)
Dept: MRI IMAGING | Facility: HOSPITAL | Age: 68
Discharge: HOME | End: 2023-09-06
Payer: MEDICARE

## 2023-09-06 DIAGNOSIS — H93.A3 PULSATILE TINNITUS, BILATERAL: ICD-10-CM

## 2023-09-06 LAB — CREAT BLDA-MCNC: 1.1 MG/DL (ref 0.6–1.3)

## 2023-09-06 PROCEDURE — 82565 ASSAY OF CREATININE: CPT

## 2023-09-06 PROCEDURE — 70549 MR ANGIOGRAPH NECK W/O&W/DYE: CPT

## 2023-09-06 PROCEDURE — 70544 MR ANGIOGRAPHY HEAD W/O DYE: CPT

## 2023-09-06 PROCEDURE — 0 GADOBENATE DIMEGLUMINE 529 MG/ML SOLUTION: Performed by: OTOLARYNGOLOGY

## 2023-09-06 PROCEDURE — A9577 INJ MULTIHANCE: HCPCS | Performed by: OTOLARYNGOLOGY

## 2023-09-06 RX ADMIN — GADOBENATE DIMEGLUMINE 19 ML: 529 INJECTION, SOLUTION INTRAVENOUS at 11:44

## 2023-09-08 ENCOUNTER — HOSPITAL ENCOUNTER (OUTPATIENT)
Dept: PULMONOLOGY | Facility: HOSPITAL | Age: 68
Discharge: HOME | End: 2023-09-08
Payer: MEDICARE

## 2023-09-08 ENCOUNTER — HOSPITAL ENCOUNTER (OUTPATIENT)
Dept: ULTRASOUND IMAGING | Facility: HOSPITAL | Age: 68
Discharge: HOME | End: 2023-09-08
Payer: MEDICARE

## 2023-09-08 DIAGNOSIS — R55 SYNCOPE AND COLLAPSE: ICD-10-CM

## 2023-09-08 PROCEDURE — 95812 EEG 41-60 MINUTES: CPT

## 2023-09-08 PROCEDURE — 93880 EXTRACRANIAL BILAT STUDY: CPT

## 2024-01-08 ENCOUNTER — OFFICE VISIT (OUTPATIENT)
Dept: NEUROLOGY | Facility: CLINIC | Age: 69
End: 2024-01-08
Payer: MEDICARE

## 2024-01-08 VITALS
HEART RATE: 82 BPM | OXYGEN SATURATION: 96 % | SYSTOLIC BLOOD PRESSURE: 110 MMHG | HEIGHT: 63 IN | BODY MASS INDEX: 39.16 KG/M2 | DIASTOLIC BLOOD PRESSURE: 62 MMHG | WEIGHT: 221 LBS

## 2024-01-08 DIAGNOSIS — R55 SYNCOPE AND COLLAPSE: ICD-10-CM

## 2024-01-08 DIAGNOSIS — F07.81 POST CONCUSSION SYNDROME: Primary | ICD-10-CM

## 2024-01-08 PROCEDURE — 99213 OFFICE O/P EST LOW 20 MIN: CPT | Performed by: PSYCHIATRY & NEUROLOGY

## 2024-01-08 PROCEDURE — 1160F RVW MEDS BY RX/DR IN RCRD: CPT | Performed by: PSYCHIATRY & NEUROLOGY

## 2024-01-08 PROCEDURE — 3074F SYST BP LT 130 MM HG: CPT | Performed by: PSYCHIATRY & NEUROLOGY

## 2024-01-08 PROCEDURE — 1159F MED LIST DOCD IN RCRD: CPT | Performed by: PSYCHIATRY & NEUROLOGY

## 2024-01-08 PROCEDURE — 3078F DIAST BP <80 MM HG: CPT | Performed by: PSYCHIATRY & NEUROLOGY

## 2024-01-08 NOTE — PROGRESS NOTES
Notes by LPN:  Patient presents for 3 mo follow up post concussion syndrome. Still experiences loss of balance but dizziness and vertigo is better.           Subjective:     Patient ID: Diane Gerardo is a 68 y.o. female.    History of Present Illness  The following portions of the patient's history were reviewed and updated as appropriate: allergies, current medications, past family history, past medical history, past social history, past surgical history, and problem list.    Review of Systems   Constitutional:  Positive for fatigue. Negative for activity change and appetite change.   HENT:  Negative for facial swelling, trouble swallowing and voice change.    Eyes:  Negative for photophobia, pain and visual disturbance.   Respiratory:  Negative for chest tightness, shortness of breath and wheezing.    Cardiovascular:  Negative for chest pain, palpitations and leg swelling.   Gastrointestinal:  Negative for abdominal pain, nausea and vomiting.   Endocrine: Negative for cold intolerance and heat intolerance.   Musculoskeletal:  Negative for arthralgias, back pain, gait problem, joint swelling, myalgias, neck pain and neck stiffness.   Neurological:  Positive for weakness and numbness. Negative for dizziness, tremors, seizures, syncope, facial asymmetry, speech difficulty, light-headedness and headaches.   Hematological:  Does not bruise/bleed easily.   Psychiatric/Behavioral:  Negative for agitation, behavioral problems, confusion, decreased concentration, dysphoric mood, hallucinations, self-injury, sleep disturbance and suicidal ideas. The patient is not nervous/anxious and is not hyperactive.         Objective:    Neurologic Exam  Awake alert pleasant cooperative oriented with fluent speech and normal speech comprehension.  Normal social demeanor.     Cranial nerves II through XII normal and symmetric.     Motor exam reveals normal symmetric tone bulk and power throughout.  No drift.  No spasticity.  No  lateralizing features.     Sensory exam reveals symmetric sensation to light touch temperature vibration in both upper extremities.  In the lower extremities she has reduced vibration sensation from the knee down on the left.     Coordination testing reveals reasonable finger-nose-finger and rapid alternating movements.  Romberg testing is negative.  Walks in a narrow base with reasonable stride length.     Tendon reflexes are brisk throughout at 3+ except at the ankles where they are 2+ and the plantar signs are equivocal bilaterally.  Physical Exam    Assessment/Plan:     Diagnoses and all orders for this visit:    1. Post concussion syndrome (Primary)    2. Syncope and collapse     Substantial improvement in postconcussion symptoms in the 8 months since her closed head injury.  For all intents and purposes she is essentially back to baseline and is likely at maximal medical improvement.  She still has a little bit of dizziness when she stands up.  I reassured her that her EEG was normal and that her carotid ultrasound revealed no significant flow-limiting stenoses (both performed to evaluate for more concerning sources for her syncope).  Her exam is stable.  No other changes are indicated currently from a neurological perspective but she is encouraged to call with any problems or questions.  We would be happy to see her back at any time.

## 2024-01-23 ENCOUNTER — HOSPITAL ENCOUNTER (OUTPATIENT)
Dept: GENERAL RADIOLOGY | Facility: HOSPITAL | Age: 69
Discharge: HOME | End: 2024-01-23
Admitting: STUDENT IN AN ORGANIZED HEALTH CARE EDUCATION/TRAINING PROGRAM
Payer: MEDICARE

## 2024-01-23 ENCOUNTER — TRANSCRIBE ORDERS (OUTPATIENT)
Dept: ADMINISTRATIVE | Facility: HOSPITAL | Age: 69
End: 2024-01-23
Payer: MEDICARE

## 2024-01-23 DIAGNOSIS — M79.672 LEFT FOOT PAIN: ICD-10-CM

## 2024-01-23 DIAGNOSIS — M79.672 LEFT FOOT PAIN: Primary | ICD-10-CM

## 2024-01-23 PROCEDURE — 73630 X-RAY EXAM OF FOOT: CPT

## 2024-02-07 ENCOUNTER — TRANSCRIBE ORDERS (OUTPATIENT)
Dept: ULTRASOUND IMAGING | Facility: HOSPITAL | Age: 69
End: 2024-02-07
Payer: MEDICARE

## 2024-02-07 DIAGNOSIS — I73.00 RAYNAUD'S SYNDROME WITHOUT GANGRENE: Primary | ICD-10-CM

## 2024-02-07 DIAGNOSIS — E04.1 NONTOXIC SINGLE THYROID NODULE: ICD-10-CM

## 2024-02-07 DIAGNOSIS — E03.9 HYPOTHYROIDISM, UNSPECIFIED TYPE: ICD-10-CM

## 2024-02-19 ENCOUNTER — HOSPITAL ENCOUNTER (OUTPATIENT)
Dept: ULTRASOUND IMAGING | Facility: HOSPITAL | Age: 69
Discharge: HOME | End: 2024-02-19
Admitting: STUDENT IN AN ORGANIZED HEALTH CARE EDUCATION/TRAINING PROGRAM
Payer: MEDICARE

## 2024-02-19 DIAGNOSIS — E03.9 HYPOTHYROIDISM, UNSPECIFIED TYPE: ICD-10-CM

## 2024-02-19 DIAGNOSIS — I73.00 RAYNAUD'S SYNDROME WITHOUT GANGRENE: ICD-10-CM

## 2024-02-19 DIAGNOSIS — E04.1 NONTOXIC SINGLE THYROID NODULE: ICD-10-CM

## 2024-02-19 PROCEDURE — 76536 US EXAM OF HEAD AND NECK: CPT

## 2024-04-30 ENCOUNTER — TRANSCRIBE ORDERS (OUTPATIENT)
Dept: ADMINISTRATIVE | Facility: HOSPITAL | Age: 69
End: 2024-04-30
Payer: MEDICARE

## 2024-04-30 DIAGNOSIS — R20.2 PARESTHESIA OF SKIN: Primary | ICD-10-CM

## 2024-08-21 ENCOUNTER — PROCEDURE VISIT (OUTPATIENT)
Dept: NEUROLOGY | Facility: CLINIC | Age: 69
End: 2024-08-21
Payer: MEDICARE

## 2024-08-21 VITALS
HEART RATE: 58 BPM | DIASTOLIC BLOOD PRESSURE: 82 MMHG | HEIGHT: 63 IN | SYSTOLIC BLOOD PRESSURE: 124 MMHG | OXYGEN SATURATION: 96 % | BODY MASS INDEX: 39.16 KG/M2

## 2024-08-21 DIAGNOSIS — R20.0 NUMBNESS AND TINGLING: Primary | ICD-10-CM

## 2024-08-21 DIAGNOSIS — R20.2 NUMBNESS AND TINGLING: Primary | ICD-10-CM

## 2024-08-21 NOTE — PROGRESS NOTES
EMG and Nerve Conduction Studies      History: 68-year-old woman with bilateral hand numbness.      Results: Nerve conduction studies were performed on both upper extremities.  Bilateral median antidromic sensory nerve action potentials across the wrist segment were prolonged in latency while radial and ulnar antidromic sensory nerve action potentials across the wrist segments were normal bilaterally.  Left median compound motor action potentials were borderline in latency across the wrist segment with reduced amplitude.  Right median compound motor action potentials were normal.  Bilateral median and ulnar F waves were present and normal in latency.    EMG needle examination of selected muscles of both upper extremities revealed no abnormal insertional activity, spontaneous activity, or motor unit remodeling.    Impression: This nerve conduction study and EMG needle exam of the upper extremities is abnormal because of    1.  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    2.  Mild right median neuropathy at the wrist, as can be seen in carpal tunnel syndrome.    Brody Hahn M.D.              Dictated utilizing Dragon dictation.

## 2024-09-04 ENCOUNTER — PROCEDURE VISIT (OUTPATIENT)
Dept: NEUROLOGY | Facility: CLINIC | Age: 69
End: 2024-09-04
Payer: MEDICARE

## 2024-09-04 VITALS
HEART RATE: 69 BPM | SYSTOLIC BLOOD PRESSURE: 116 MMHG | BODY MASS INDEX: 39.16 KG/M2 | HEIGHT: 63 IN | DIASTOLIC BLOOD PRESSURE: 64 MMHG | OXYGEN SATURATION: 93 %

## 2024-09-04 DIAGNOSIS — R20.0 NUMBNESS AND TINGLING: Primary | ICD-10-CM

## 2024-09-04 DIAGNOSIS — R20.2 NUMBNESS AND TINGLING: Primary | ICD-10-CM

## 2024-12-03 ENCOUNTER — TRANSCRIBE ORDERS (OUTPATIENT)
Dept: MAMMOGRAPHY | Facility: HOSPITAL | Age: 69
End: 2024-12-03
Payer: MEDICARE

## 2024-12-03 DIAGNOSIS — Z12.31 ENCOUNTER FOR SCREENING MAMMOGRAM FOR MALIGNANT NEOPLASM OF BREAST: Primary | ICD-10-CM

## 2024-12-24 NOTE — PROGRESS NOTES
EMG and Nerve Conduction Studies      History: 68-year-old woman with numbness and tingling in the lower extremities.      Results: Nerve conduction studies were performed on both lower extremities.  Bilateral sural sensory nerve action potentials were normal.  Left fibular compound motor action potentials were normal.  Right fibular compound motor action potentials were low in amplitude but otherwise within normal limits with unremarkable waveform morphology.  This is felt to be technical secondary to the patient's body habitus.  Left tibial compound motor action potentials were borderline in distal latency but otherwise within normal limits with unremarkable waveform morphology.  Right tibial compound motor action potentials were normal.  Bilateral fibular and tibial F waves were present, symmetric, and normal in latency.  H-reflexes were present and symmetric bilaterally.    EMG needle examination of selected muscles of both lower extremities revealed no abnormal insertional activity, spontaneous activity, or motor unit remodeling.  Please see accompanying data for details.    Impression: This is an unremarkable nerve conduction study and EMG needle examination of both lower extremities, revealing no clear diagnostic abnormalities.    Brody Hahn M.D.              Dictated utilizing Dragon dictation.    18 Hour(s) 33 Minute(s)

## 2025-02-05 ENCOUNTER — HOSPITAL ENCOUNTER (OUTPATIENT)
Dept: MAMMOGRAPHY | Facility: HOSPITAL | Age: 70
Discharge: HOME | End: 2025-02-05
Admitting: INTERNAL MEDICINE
Payer: MEDICARE

## 2025-02-05 DIAGNOSIS — Z12.31 ENCOUNTER FOR SCREENING MAMMOGRAM FOR MALIGNANT NEOPLASM OF BREAST: ICD-10-CM

## 2025-02-05 PROCEDURE — 77063 BREAST TOMOSYNTHESIS BI: CPT

## 2025-02-05 PROCEDURE — 77063 BREAST TOMOSYNTHESIS BI: CPT | Performed by: RADIOLOGY

## 2025-02-05 PROCEDURE — 77067 SCR MAMMO BI INCL CAD: CPT | Performed by: RADIOLOGY

## 2025-02-05 PROCEDURE — 77067 SCR MAMMO BI INCL CAD: CPT

## 2025-02-12 ENCOUNTER — TRANSCRIBE ORDERS (OUTPATIENT)
Dept: ADMINISTRATIVE | Facility: HOSPITAL | Age: 70
End: 2025-02-12
Payer: MEDICARE

## 2025-02-12 DIAGNOSIS — R92.8 ABNORMAL MAMMOGRAM: Primary | ICD-10-CM

## 2025-02-26 ENCOUNTER — APPOINTMENT (OUTPATIENT)
Dept: ULTRASOUND IMAGING | Facility: HOSPITAL | Age: 70
End: 2025-02-26
Payer: MEDICARE

## 2025-02-26 ENCOUNTER — HOSPITAL ENCOUNTER (OUTPATIENT)
Dept: MAMMOGRAPHY | Facility: HOSPITAL | Age: 70
Discharge: HOME | End: 2025-02-26
Admitting: INTERNAL MEDICINE
Payer: MEDICARE

## 2025-02-26 DIAGNOSIS — R92.8 ABNORMAL MAMMOGRAM: ICD-10-CM

## 2025-02-26 PROCEDURE — 77065 DX MAMMO INCL CAD UNI: CPT

## 2025-02-26 PROCEDURE — G0279 TOMOSYNTHESIS, MAMMO: HCPCS

## 2025-04-30 ENCOUNTER — TRANSCRIBE ORDERS (OUTPATIENT)
Dept: ULTRASOUND IMAGING | Facility: HOSPITAL | Age: 70
End: 2025-04-30
Payer: MEDICARE

## 2025-04-30 ENCOUNTER — TRANSCRIBE ORDERS (OUTPATIENT)
Dept: MAMMOGRAPHY | Facility: HOSPITAL | Age: 70
End: 2025-04-30
Payer: MEDICARE

## 2025-04-30 DIAGNOSIS — R92.8 ABNORMAL MAMMOGRAPHY: Primary | ICD-10-CM

## 2025-07-11 ENCOUNTER — OFFICE VISIT (OUTPATIENT)
Dept: NEUROLOGY | Facility: CLINIC | Age: 70
End: 2025-07-11
Payer: MEDICARE

## 2025-07-11 VITALS
DIASTOLIC BLOOD PRESSURE: 58 MMHG | BODY MASS INDEX: 40.72 KG/M2 | WEIGHT: 229.8 LBS | SYSTOLIC BLOOD PRESSURE: 130 MMHG | HEART RATE: 77 BPM | OXYGEN SATURATION: 98 %

## 2025-07-11 DIAGNOSIS — R20.0 NUMBNESS AND TINGLING: ICD-10-CM

## 2025-07-11 DIAGNOSIS — F07.81 POST CONCUSSION SYNDROME: ICD-10-CM

## 2025-07-11 DIAGNOSIS — M54.10 RADICULAR LEG PAIN: Primary | ICD-10-CM

## 2025-07-11 DIAGNOSIS — R20.2 NUMBNESS AND TINGLING: ICD-10-CM

## 2025-07-11 PROCEDURE — 1159F MED LIST DOCD IN RCRD: CPT | Performed by: PSYCHIATRY & NEUROLOGY

## 2025-07-11 PROCEDURE — 1160F RVW MEDS BY RX/DR IN RCRD: CPT | Performed by: PSYCHIATRY & NEUROLOGY

## 2025-07-11 PROCEDURE — 99214 OFFICE O/P EST MOD 30 MIN: CPT | Performed by: PSYCHIATRY & NEUROLOGY

## 2025-07-11 PROCEDURE — 3078F DIAST BP <80 MM HG: CPT | Performed by: PSYCHIATRY & NEUROLOGY

## 2025-07-11 PROCEDURE — 3075F SYST BP GE 130 - 139MM HG: CPT | Performed by: PSYCHIATRY & NEUROLOGY

## 2025-07-11 RX ORDER — LEVOTHYROXINE SODIUM 175 UG/1
175 TABLET ORAL DAILY
COMMUNITY
Start: 2025-03-17

## 2025-07-11 NOTE — PROGRESS NOTES
Notes by CMA:  Ms Gerardo is here today for a follow up appointment. She feels that she is mostly recovered from her concussion but still experiences some episodes of dizziness.    Subjective:     Patient ID: Diane Gerardo is a 69 y.o. female.    History of Present Illness  The following portions of the patient's history were reviewed and updated as appropriate: allergies, past family history, past medical history, past social history, past surgical history, and problem list.    History of Present Illness  The patient is a 69-year-old woman here for post-concussion syndrome and left leg radicular pain and numbness.    She reports an improvement in her condition following a head injury sustained on 05/23/2023, when she was struck by a barn door. However, she continues to experience dizziness upon standing up.    For the past 3 weeks, she has been experiencing constant numbness in her left leg, regardless of her position. The numbness extends from her hip to her ankle, occasionally affecting her foot as well. She describes a persistent tingling sensation, similar to the feeling of her leg being asleep. Additionally, she reports back pain.    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 The tumor's location was unclear, as it appeared to be either connected to her kidney or her spine. This diagnosis was made by Dr. Aaron Gamino based on a plain x-ray taken at [facility] in the 1990s. She has not had any follow-up since then. She reports no issues with bladder control. Sitting exacerbates the numbness, while standing and moving around provide some relief, although the numbness does not completely subside.    MEDICATIONS  CURRENT MEDS:  Ubrelvy  Compliance: She feels like she is probably taking it too late.    Review of Systems   Neurological:  Positive for dizziness.        Objective:    Neurological Exam   Motor exam reveals normal symmetric tone bulk and power throughout.  No drift.   No spasticity.  No lateralizing features.     Sensory exam reveals symmetric sensation to light touch temperature vibration in both upper extremities.  In the lower extremities or vibration sensation seems to have improved.  There is does not appear to be any clear objective focal loss of sensory function on the left despite her subjective complaints.     Coordination testing reveals reasonable finger-nose-finger and rapid alternating movements.  Romberg testing is negative.  Walks in a narrow base with reasonable stride length.     Tendon reflexes are brisk throughout at 3+ except at the ankles where they are 2+ and the plantar signs are equivocal bilaterally.  Stable reflex exam.  Physical Exam    Assessment/Plan:     Diagnoses and all orders for this visit:    1. Radicular leg pain (Primary)  -     Ambulatory Referral to Physical Therapy for Evaluation & Treatment    2. Post concussion syndrome    3. Numbness and tingling [R20.0, R20.2]         Assessment & Plan  1. Post-concussion syndrome.  Her symptoms have shown improvement. She is advised to increase her fluid intake to help with dizziness.    2. Left leg radicular pain and numbness.  The numbness and tingling in her left leg could be due to an irritated nerve in her back or leg.  It is a little early in her course for EMG.  I reassured her that most individuals with nerve irritation in their back affecting their leg improve spontaneously or with physical therapy. A referral for physical therapy will be made. If there is no improvement following physical therapy, a nerve test or MRI of her back will be considered.    Follow-up  A follow-up visit is scheduled in 6 weeks.    Patient or patient representative verbalized consent for the use of Ambient Listening during the visit with  Brody Hahn MD for chart documentation. 7/11/2025  11:24 EDT

## 2025-07-28 ENCOUNTER — TRANSCRIBE ORDERS (OUTPATIENT)
Dept: ADMINISTRATIVE | Facility: HOSPITAL | Age: 70
End: 2025-07-28
Payer: MEDICARE

## 2025-07-28 DIAGNOSIS — R10.9 RIGHT FLANK PAIN: Primary | ICD-10-CM

## 2025-08-04 ENCOUNTER — HOSPITAL ENCOUNTER (OUTPATIENT)
Dept: ULTRASOUND IMAGING | Facility: HOSPITAL | Age: 70
Discharge: HOME | End: 2025-08-04
Admitting: INTERNAL MEDICINE
Payer: MEDICARE

## 2025-08-04 DIAGNOSIS — R10.9 RIGHT FLANK PAIN: ICD-10-CM

## 2025-08-04 PROCEDURE — 76775 US EXAM ABDO BACK WALL LIM: CPT

## 2025-08-08 ENCOUNTER — TRANSCRIBE ORDERS (OUTPATIENT)
Dept: ADMINISTRATIVE | Facility: HOSPITAL | Age: 70
End: 2025-08-08
Payer: MEDICARE

## 2025-08-08 DIAGNOSIS — R10.9 RIGHT FLANK PAIN: Primary | ICD-10-CM

## 2025-08-08 DIAGNOSIS — R20.2 PARESTHESIA OF LEFT LOWER EXTREMITY: ICD-10-CM

## 2025-08-08 DIAGNOSIS — D18.09 HEMANGIOMA OF VERTEBRAL COLUMN: ICD-10-CM

## 2025-08-27 ENCOUNTER — HOSPITAL ENCOUNTER (OUTPATIENT)
Dept: MAMMOGRAPHY | Facility: HOSPITAL | Age: 70
Discharge: HOME | End: 2025-08-27
Admitting: INTERNAL MEDICINE
Payer: MEDICARE

## 2025-08-27 ENCOUNTER — HOSPITAL ENCOUNTER (OUTPATIENT)
Dept: ULTRASOUND IMAGING | Facility: HOSPITAL | Age: 70
End: 2025-08-27
Payer: MEDICARE

## 2025-08-27 DIAGNOSIS — R92.8 ABNORMAL MAMMOGRAPHY: ICD-10-CM

## 2025-08-27 PROCEDURE — G0279 TOMOSYNTHESIS, MAMMO: HCPCS

## 2025-08-27 PROCEDURE — 77065 DX MAMMO INCL CAD UNI: CPT

## 2025-08-30 ENCOUNTER — APPOINTMENT (OUTPATIENT)
Dept: CT IMAGING | Facility: HOSPITAL | Age: 70
End: 2025-08-30
Payer: MEDICARE

## 2025-08-30 ENCOUNTER — APPOINTMENT (OUTPATIENT)
Dept: GENERAL RADIOLOGY | Facility: HOSPITAL | Age: 70
End: 2025-08-30
Payer: MEDICARE

## 2025-08-30 ENCOUNTER — HOSPITAL ENCOUNTER (EMERGENCY)
Facility: HOSPITAL | Age: 70
Discharge: HOME | End: 2025-08-30
Attending: STUDENT IN AN ORGANIZED HEALTH CARE EDUCATION/TRAINING PROGRAM
Payer: MEDICARE

## 2025-08-30 VITALS
DIASTOLIC BLOOD PRESSURE: 66 MMHG | HEART RATE: 66 BPM | RESPIRATION RATE: 18 BRPM | TEMPERATURE: 98.2 F | WEIGHT: 232 LBS | OXYGEN SATURATION: 96 % | BODY MASS INDEX: 41.11 KG/M2 | SYSTOLIC BLOOD PRESSURE: 170 MMHG | HEIGHT: 63 IN

## 2025-08-30 DIAGNOSIS — I10 HYPERTENSION, UNSPECIFIED TYPE: ICD-10-CM

## 2025-08-30 DIAGNOSIS — R10.9 SIDE PAIN: Primary | ICD-10-CM

## 2025-08-30 DIAGNOSIS — D64.9 ANEMIA, UNSPECIFIED TYPE: ICD-10-CM

## 2025-08-30 LAB
ALBUMIN SERPL-MCNC: 3.5 G/DL (ref 3.5–5.2)
ALBUMIN/GLOB SERPL: 1.3 G/DL
ALP SERPL-CCNC: 92 U/L (ref 39–117)
ALT SERPL W P-5'-P-CCNC: 15 U/L (ref 1–33)
ANION GAP SERPL CALCULATED.3IONS-SCNC: 10.3 MMOL/L (ref 5–15)
AST SERPL-CCNC: 18 U/L (ref 1–32)
BASOPHILS # BLD AUTO: 0.02 10*3/MM3 (ref 0–0.2)
BASOPHILS NFR BLD AUTO: 0.4 % (ref 0–1.5)
BILIRUB SERPL-MCNC: 0.3 MG/DL (ref 0–1.2)
BILIRUB UR QL STRIP: NEGATIVE
BUN SERPL-MCNC: 21.7 MG/DL (ref 8–23)
BUN/CREAT SERPL: 23.6 (ref 7–25)
CALCIUM SPEC-SCNC: 8.4 MG/DL (ref 8.6–10.5)
CHLORIDE SERPL-SCNC: 108 MMOL/L (ref 98–107)
CLARITY UR: CLEAR
CO2 SERPL-SCNC: 23.7 MMOL/L (ref 22–29)
COLOR UR: YELLOW
CREAT SERPL-MCNC: 0.92 MG/DL (ref 0.57–1)
DEPRECATED RDW RBC AUTO: 40.9 FL (ref 37–54)
EGFRCR SERPLBLD CKD-EPI 2021: 68 ML/MIN/1.73
EOSINOPHIL # BLD AUTO: 0.15 10*3/MM3 (ref 0–0.4)
EOSINOPHIL NFR BLD AUTO: 3.2 % (ref 0.3–6.2)
ERYTHROCYTE [DISTWIDTH] IN BLOOD BY AUTOMATED COUNT: 13 % (ref 12.3–15.4)
GLOBULIN UR ELPH-MCNC: 2.8 GM/DL
GLUCOSE BLDC GLUCOMTR-MCNC: 83 MG/DL (ref 70–130)
GLUCOSE SERPL-MCNC: 131 MG/DL (ref 65–99)
GLUCOSE UR STRIP-MCNC: NEGATIVE MG/DL
HCT VFR BLD AUTO: 31.7 % (ref 34–46.6)
HGB BLD-MCNC: 10.1 G/DL (ref 12–15.9)
HGB UR QL STRIP.AUTO: NEGATIVE
IMM GRANULOCYTES # BLD AUTO: 0 10*3/MM3 (ref 0–0.05)
IMM GRANULOCYTES NFR BLD AUTO: 0 % (ref 0–0.5)
KETONES UR QL STRIP: NEGATIVE
LEUKOCYTE ESTERASE UR QL STRIP.AUTO: NEGATIVE
LIPASE SERPL-CCNC: 39 U/L (ref 13–60)
LYMPHOCYTES # BLD AUTO: 1.36 10*3/MM3 (ref 0.7–3.1)
LYMPHOCYTES NFR BLD AUTO: 29.4 % (ref 19.6–45.3)
MCH RBC QN AUTO: 27.4 PG (ref 26.6–33)
MCHC RBC AUTO-ENTMCNC: 31.9 G/DL (ref 31.5–35.7)
MCV RBC AUTO: 86.1 FL (ref 79–97)
MONOCYTES # BLD AUTO: 0.39 10*3/MM3 (ref 0.1–0.9)
MONOCYTES NFR BLD AUTO: 8.4 % (ref 5–12)
NEUTROPHILS NFR BLD AUTO: 2.71 10*3/MM3 (ref 1.7–7)
NEUTROPHILS NFR BLD AUTO: 58.6 % (ref 42.7–76)
NITRITE UR QL STRIP: NEGATIVE
NRBC BLD AUTO-RTO: 0 /100 WBC (ref 0–0.2)
PH UR STRIP.AUTO: 7 [PH] (ref 4.5–8)
PLATELET # BLD AUTO: 184 10*3/MM3 (ref 140–450)
PMV BLD AUTO: 9.6 FL (ref 6–12)
POTASSIUM SERPL-SCNC: 3.6 MMOL/L (ref 3.5–5.2)
PROT SERPL-MCNC: 6.3 G/DL (ref 6–8.5)
PROT UR QL STRIP: NEGATIVE
QT INTERVAL: 433 MS
QTC INTERVAL: 462 MS
RBC # BLD AUTO: 3.68 10*6/MM3 (ref 3.77–5.28)
SODIUM SERPL-SCNC: 142 MMOL/L (ref 136–145)
SP GR UR STRIP: 1.01 (ref 1–1.03)
TROPONIN T SERPL HS-MCNC: 10 NG/L
TROPONIN T SERPL HS-MCNC: 9 NG/L
UROBILINOGEN UR QL STRIP: NORMAL
WBC NRBC COR # BLD AUTO: 4.63 10*3/MM3 (ref 3.4–10.8)

## 2025-08-30 PROCEDURE — 25010000002 MORPHINE PER 10 MG: Performed by: STUDENT IN AN ORGANIZED HEALTH CARE EDUCATION/TRAINING PROGRAM

## 2025-08-30 PROCEDURE — 25010000002 ONDANSETRON PER 1 MG: Performed by: STUDENT IN AN ORGANIZED HEALTH CARE EDUCATION/TRAINING PROGRAM

## 2025-08-30 PROCEDURE — 25010000002 HYDRALAZINE PER 20 MG: Performed by: STUDENT IN AN ORGANIZED HEALTH CARE EDUCATION/TRAINING PROGRAM

## 2025-08-30 PROCEDURE — 85025 COMPLETE CBC W/AUTO DIFF WBC: CPT | Performed by: STUDENT IN AN ORGANIZED HEALTH CARE EDUCATION/TRAINING PROGRAM

## 2025-08-30 PROCEDURE — 71260 CT THORAX DX C+: CPT

## 2025-08-30 PROCEDURE — 84484 ASSAY OF TROPONIN QUANT: CPT | Performed by: STUDENT IN AN ORGANIZED HEALTH CARE EDUCATION/TRAINING PROGRAM

## 2025-08-30 PROCEDURE — 82948 REAGENT STRIP/BLOOD GLUCOSE: CPT

## 2025-08-30 PROCEDURE — 93005 ELECTROCARDIOGRAM TRACING: CPT | Performed by: STUDENT IN AN ORGANIZED HEALTH CARE EDUCATION/TRAINING PROGRAM

## 2025-08-30 PROCEDURE — 99285 EMERGENCY DEPT VISIT HI MDM: CPT | Performed by: STUDENT IN AN ORGANIZED HEALTH CARE EDUCATION/TRAINING PROGRAM

## 2025-08-30 PROCEDURE — 25510000001 IOPAMIDOL PER 1 ML: Performed by: STUDENT IN AN ORGANIZED HEALTH CARE EDUCATION/TRAINING PROGRAM

## 2025-08-30 PROCEDURE — 74177 CT ABD & PELVIS W/CONTRAST: CPT

## 2025-08-30 PROCEDURE — 80053 COMPREHEN METABOLIC PANEL: CPT | Performed by: STUDENT IN AN ORGANIZED HEALTH CARE EDUCATION/TRAINING PROGRAM

## 2025-08-30 PROCEDURE — 81003 URINALYSIS AUTO W/O SCOPE: CPT | Performed by: STUDENT IN AN ORGANIZED HEALTH CARE EDUCATION/TRAINING PROGRAM

## 2025-08-30 PROCEDURE — 83690 ASSAY OF LIPASE: CPT | Performed by: STUDENT IN AN ORGANIZED HEALTH CARE EDUCATION/TRAINING PROGRAM

## 2025-08-30 RX ORDER — CYCLOBENZAPRINE HCL 10 MG
10 TABLET ORAL ONCE
Status: COMPLETED | OUTPATIENT
Start: 2025-08-30 | End: 2025-08-30

## 2025-08-30 RX ORDER — ONDANSETRON 2 MG/ML
4 INJECTION INTRAMUSCULAR; INTRAVENOUS ONCE
Status: COMPLETED | OUTPATIENT
Start: 2025-08-30 | End: 2025-08-30

## 2025-08-30 RX ORDER — CYCLOBENZAPRINE HCL 10 MG
TABLET ORAL
Status: COMPLETED
Start: 2025-08-30 | End: 2025-08-30

## 2025-08-30 RX ORDER — CYCLOBENZAPRINE HCL 10 MG
10 TABLET ORAL 3 TIMES DAILY PRN
Qty: 15 TABLET | Refills: 0 | Status: SHIPPED | OUTPATIENT
Start: 2025-08-30 | End: 2025-09-04

## 2025-08-30 RX ORDER — HYDRALAZINE HYDROCHLORIDE 20 MG/ML
10 INJECTION INTRAMUSCULAR; INTRAVENOUS ONCE
Status: COMPLETED | OUTPATIENT
Start: 2025-08-30 | End: 2025-08-30

## 2025-08-30 RX ORDER — ACETAMINOPHEN 325 MG/1
650 TABLET ORAL ONCE
Status: COMPLETED | OUTPATIENT
Start: 2025-08-30 | End: 2025-08-30

## 2025-08-30 RX ORDER — IOPAMIDOL 755 MG/ML
100 INJECTION, SOLUTION INTRAVASCULAR
Status: COMPLETED | OUTPATIENT
Start: 2025-08-30 | End: 2025-08-30

## 2025-08-30 RX ADMIN — MORPHINE SULFATE 4 MG: 4 INJECTION, SOLUTION INTRAMUSCULAR; INTRAVENOUS at 17:24

## 2025-08-30 RX ADMIN — ACETAMINOPHEN 650 MG: 325 TABLET, FILM COATED ORAL at 20:17

## 2025-08-30 RX ADMIN — HYDRALAZINE HYDROCHLORIDE 10 MG: 20 INJECTION INTRAMUSCULAR; INTRAVENOUS at 18:22

## 2025-08-30 RX ADMIN — ONDANSETRON 4 MG: 2 INJECTION, SOLUTION INTRAMUSCULAR; INTRAVENOUS at 17:25

## 2025-08-30 RX ADMIN — CYCLOBENZAPRINE HYDROCHLORIDE 10 MG: 10 TABLET, FILM COATED ORAL at 16:38

## 2025-08-30 RX ADMIN — IOPAMIDOL 100 ML: 755 INJECTION, SOLUTION INTRAVENOUS at 17:37

## 2025-08-30 RX ADMIN — Medication 10 MG: at 16:38

## (undated) DEVICE — Device

## (undated) DEVICE — FRCP BX RADJAW4 NDL 2.8 240CM LG OG BX40

## (undated) DEVICE — SYR LL TP 10ML STRL

## (undated) DEVICE — PK CATH CARD 40

## (undated) DEVICE — SUT NUROLON 0 CT1 CR8 18IN C521D

## (undated) DEVICE — VIAL FORMALIN CAP 10P 40ML

## (undated) DEVICE — JACKT LAB KNIT COLR LG BLU

## (undated) DEVICE — TOTAL TRAY, DB, 100% SILI FOLEY, 16FR 10: Brand: MEDLINE

## (undated) DEVICE — KT ORCA ORCAPOD DISP STRL

## (undated) DEVICE — SYR LL 3CC

## (undated) DEVICE — ADAPT CLN BIOGUARD AIR/H2O DISP

## (undated) DEVICE — MASK,FACE,SHIELD,BLUE,ANTI FOG,TIES: Brand: MEDLINE

## (undated) DEVICE — TROCARS: Brand: KII® BALLOON BLUNT TIP SYSTEM

## (undated) DEVICE — DECANTER: Brand: UNBRANDED

## (undated) DEVICE — GLV SURG SENSICARE W/ALOE PF LF 6.5 STRL

## (undated) DEVICE — THE BITE BLOCK MAXI, LATEX FREE STRAP IS USED TO PROTECT THE ENDOSCOPE INSERTION TUBE FROM BEING BITTEN BY THE PATIENT.

## (undated) DEVICE — GLV SURG SENSICARE MICRO PF LF 6 STRL

## (undated) DEVICE — BW-412T DISP COMBO CLEANING BRUSH: Brand: SINGLE USE COMBINATION CLEANING BRUSH

## (undated) DEVICE — LAB CORP AGAR SLANT UREA PK/10

## (undated) DEVICE — CATH DIAG IMPULSE FL3.5 5F 100CM

## (undated) DEVICE — ENDOPATH XCEL BLADELESS TROCARS WITH STABILITY SLEEVES: Brand: ENDOPATH XCEL

## (undated) DEVICE — SUT VIC 0/0 UR6 27IN DYED J603H

## (undated) DEVICE — ENDOGATOR AUXILIARY WATER JET CONNECTOR: Brand: ENDOGATOR

## (undated) DEVICE — MARKR SKIN W/RULR AND LBL

## (undated) DEVICE — TBG INSUFL W FLTR STRL

## (undated) DEVICE — KT MANIFLD CARDIAC

## (undated) DEVICE — SUT VIC 0 CT1 36IN J946H

## (undated) DEVICE — SUT ETHIB 0/0 MO6 I8IN CX45D

## (undated) DEVICE — 3M™ STERI-STRIP™ REINFORCED ADHESIVE SKIN CLOSURES, R1547, 1/2 IN X 4 IN (12 MM X 100 MM), 6 STRIPS/ENVELOPE: Brand: 3M™ STERI-STRIP™

## (undated) DEVICE — Device: Brand: DEFENDO AIR/WATER/SUCTION AND BIOPSY VALVE

## (undated) DEVICE — TROCAR SITE CLOSURE DEVICE: Brand: ENDO CLOSE

## (undated) DEVICE — 3M™ IOBAN™ 2 ANTIMICROBIAL INCISE DRAPE 6650EZ: Brand: IOBAN™ 2

## (undated) DEVICE — SPNG GZ 2S 2X2 8PLY STRL PK/2

## (undated) DEVICE — SUCTION CANISTER, 3000CC,SAFELINER: Brand: DEROYAL

## (undated) DEVICE — PK PROC MAJ 90

## (undated) DEVICE — ENDOCUT SCISSOR TIP, DISPOSABLE: Brand: RENEW

## (undated) DEVICE — APPL CHLORAPREP W/TINT 26ML ORNG

## (undated) DEVICE — SAFELINER SUCTION CANISTER 1000CC: Brand: DEROYAL

## (undated) DEVICE — SUT GUT CHRM 0 LIGAPAK L114G

## (undated) DEVICE — SYR LUER SLPTP 50ML

## (undated) DEVICE — GW EMR FIX EXCHG J STD .035 3MM 260CM

## (undated) DEVICE — SUCTION CANISTER, 1000CC,SAFELINER: Brand: DEROYAL

## (undated) DEVICE — DRSNG PAD ABD 8X10IN STRL

## (undated) DEVICE — SPNG GZ WOVN 4X4IN 12PLY 10/BX STRL

## (undated) DEVICE — GOWN ISOL W/THUMB UNIV BLU BX/15

## (undated) DEVICE — NDL SPINE 22G 31/2IN BLK

## (undated) DEVICE — DRSNG SURESITE WNDW 4X4.5

## (undated) DEVICE — SUT VIC 0 CT1 27IN DYED J340H

## (undated) DEVICE — SUT MNCRYL 2/0 SH 27IN Y417H

## (undated) DEVICE — UNDYED BRAIDED (POLYGLACTIN 910), SYNTHETIC ABSORBABLE SUTURE: Brand: COATED VICRYL

## (undated) DEVICE — DRSNG SURESITE WNDW 2.38X2.75

## (undated) DEVICE — GLIDESHEATH SLENDER STAINLESS STEEL KIT: Brand: GLIDESHEATH SLENDER

## (undated) DEVICE — SUT PROLN 1 CTX 30IN 8455H

## (undated) DEVICE — LINER SURG CANSTR SXN S/RIGD 1500CC

## (undated) DEVICE — ULTRACLEAN ACCESSORY ELECTRODE 1" (2.54 CM) COATED BLADE: Brand: ULTRACLEAN

## (undated) DEVICE — PK LAP GEN 90

## (undated) DEVICE — NDL HYPO PRECISIONGLIDE REG 25G 1 1/2

## (undated) DEVICE — SUT VIC 3/0 CTI 36IN J944H

## (undated) DEVICE — CATH DIAG IMPULSE FR4 5F 100CM